# Patient Record
Sex: FEMALE | Race: WHITE | NOT HISPANIC OR LATINO | Employment: FULL TIME | URBAN - METROPOLITAN AREA
[De-identification: names, ages, dates, MRNs, and addresses within clinical notes are randomized per-mention and may not be internally consistent; named-entity substitution may affect disease eponyms.]

---

## 2020-02-05 ENCOUNTER — HOSPITAL ENCOUNTER (OUTPATIENT)
Dept: OTHER | Facility: HOSPITAL | Age: 50
Discharge: HOME | End: 2020-02-05
Attending: NURSE PRACTITIONER

## 2020-02-05 ENCOUNTER — OFFICE VISIT CONVERTED (OUTPATIENT)
Dept: FAMILY MEDICINE CLINIC | Facility: CLINIC | Age: 50
End: 2020-02-05
Attending: NURSE PRACTITIONER

## 2020-02-05 ENCOUNTER — CONVERSION ENCOUNTER (OUTPATIENT)
Dept: FAMILY MEDICINE CLINIC | Facility: CLINIC | Age: 50
End: 2020-02-05

## 2020-02-05 LAB
ALBUMIN SERPL-MCNC: 3.9 G/DL (ref 3.5–5)
ALBUMIN/GLOB SERPL: 1.2 {RATIO} (ref 1.4–2.6)
ALP SERPL-CCNC: 55 U/L (ref 42–98)
ALT SERPL-CCNC: 18 U/L (ref 10–40)
ANION GAP SERPL CALC-SCNC: 19 MMOL/L (ref 8–19)
AST SERPL-CCNC: 17 U/L (ref 15–50)
BASOPHILS # BLD AUTO: 0.04 10*3/UL (ref 0–0.2)
BASOPHILS NFR BLD AUTO: 0.4 % (ref 0–3)
BILIRUB SERPL-MCNC: 0.23 MG/DL (ref 0.2–1.3)
BUN SERPL-MCNC: 13 MG/DL (ref 5–25)
BUN/CREAT SERPL: 17 {RATIO} (ref 6–20)
CALCIUM SERPL-MCNC: 9.4 MG/DL (ref 8.7–10.4)
CHLORIDE SERPL-SCNC: 103 MMOL/L (ref 99–111)
CHOLEST SERPL-MCNC: 157 MG/DL (ref 107–200)
CHOLEST/HDLC SERPL: 2.3 {RATIO} (ref 3–6)
CONV ABS IMM GRAN: 0.04 10*3/UL (ref 0–0.2)
CONV CO2: 23 MMOL/L (ref 22–32)
CONV IMMATURE GRAN: 0.4 % (ref 0–1.8)
CONV TOTAL PROTEIN: 7.2 G/DL (ref 6.3–8.2)
CREAT UR-MCNC: 0.78 MG/DL (ref 0.5–0.9)
DEPRECATED RDW RBC AUTO: 42.1 FL (ref 36.4–46.3)
EOSINOPHIL # BLD AUTO: 0.17 10*3/UL (ref 0–0.7)
EOSINOPHIL # BLD AUTO: 1.6 % (ref 0–7)
ERYTHROCYTE [DISTWIDTH] IN BLOOD BY AUTOMATED COUNT: 12.2 % (ref 11.7–14.4)
ERYTHROCYTE [SEDIMENTATION RATE] IN BLOOD: 12 MM/H (ref 0–20)
GFR SERPLBLD BASED ON 1.73 SQ M-ARVRAT: >60 ML/MIN/{1.73_M2}
GLOBULIN UR ELPH-MCNC: 3.3 G/DL (ref 2–3.5)
GLUCOSE SERPL-MCNC: 86 MG/DL (ref 65–99)
HCT VFR BLD AUTO: 43.1 % (ref 37–47)
HDLC SERPL-MCNC: 69 MG/DL (ref 40–60)
HGB BLD-MCNC: 14.3 G/DL (ref 12–16)
LDLC SERPL CALC-MCNC: 65 MG/DL (ref 70–100)
LYMPHOCYTES # BLD AUTO: 2.13 10*3/UL (ref 1–5)
LYMPHOCYTES NFR BLD AUTO: 20.7 % (ref 20–45)
MCH RBC QN AUTO: 30.8 PG (ref 27–31)
MCHC RBC AUTO-ENTMCNC: 33.2 G/DL (ref 33–37)
MCV RBC AUTO: 92.7 FL (ref 81–99)
MONOCYTES # BLD AUTO: 0.86 10*3/UL (ref 0.2–1.2)
MONOCYTES NFR BLD AUTO: 8.3 % (ref 3–10)
NEUTROPHILS # BLD AUTO: 7.07 10*3/UL (ref 2–8)
NEUTROPHILS NFR BLD AUTO: 68.6 % (ref 30–85)
NRBC CBCN: 0 % (ref 0–0.7)
OSMOLALITY SERPL CALC.SUM OF ELEC: 291 MOSM/KG (ref 273–304)
PLATELET # BLD AUTO: 291 10*3/UL (ref 130–400)
PMV BLD AUTO: 8.5 FL (ref 9.4–12.3)
POTASSIUM SERPL-SCNC: 4.4 MMOL/L (ref 3.5–5.3)
RBC # BLD AUTO: 4.65 10*6/UL (ref 4.2–5.4)
SODIUM SERPL-SCNC: 141 MMOL/L (ref 135–147)
TRIGL SERPL-MCNC: 114 MG/DL (ref 40–150)
TSH SERPL-ACNC: 2.28 M[IU]/L (ref 0.27–4.2)
VLDLC SERPL-MCNC: 23 MG/DL (ref 5–37)
WBC # BLD AUTO: 10.31 10*3/UL (ref 4.8–10.8)

## 2020-06-17 ENCOUNTER — HOSPITAL ENCOUNTER (OUTPATIENT)
Dept: SLEEP MEDICINE | Facility: HOSPITAL | Age: 50
Discharge: HOME | End: 2020-06-17
Attending: INTERNAL MEDICINE

## 2020-10-02 ENCOUNTER — OFFICE VISIT CONVERTED (OUTPATIENT)
Dept: FAMILY MEDICINE CLINIC | Facility: CLINIC | Age: 50
End: 2020-10-02
Attending: NURSE PRACTITIONER

## 2020-10-02 ENCOUNTER — HOSPITAL ENCOUNTER (OUTPATIENT)
Dept: FAMILY MEDICINE CLINIC | Facility: CLINIC | Age: 50
Discharge: HOME | End: 2020-10-02
Attending: NURSE PRACTITIONER

## 2020-10-02 LAB
ALBUMIN SERPL-MCNC: 4.2 G/DL (ref 3.5–5)
ALBUMIN/GLOB SERPL: 1.5 {RATIO} (ref 1.4–2.6)
ALP SERPL-CCNC: 75 U/L (ref 42–98)
ALT SERPL-CCNC: 33 U/L (ref 10–40)
ANION GAP SERPL CALC-SCNC: 18 MMOL/L (ref 8–19)
AST SERPL-CCNC: 32 U/L (ref 15–50)
BASOPHILS # BLD AUTO: 0.05 10*3/UL (ref 0–0.2)
BASOPHILS NFR BLD AUTO: 0.5 % (ref 0–3)
BILIRUB SERPL-MCNC: 0.43 MG/DL (ref 0.2–1.3)
BUN SERPL-MCNC: 8 MG/DL (ref 5–25)
BUN/CREAT SERPL: 8 {RATIO} (ref 6–20)
CALCIUM SERPL-MCNC: 9.4 MG/DL (ref 8.7–10.4)
CHLORIDE SERPL-SCNC: 102 MMOL/L (ref 99–111)
CHOLEST SERPL-MCNC: 175 MG/DL (ref 107–200)
CHOLEST/HDLC SERPL: 3 {RATIO} (ref 3–6)
CONV ABS IMM GRAN: 0.03 10*3/UL (ref 0–0.2)
CONV CO2: 25 MMOL/L (ref 22–32)
CONV IMMATURE GRAN: 0.3 % (ref 0–1.8)
CONV TOTAL PROTEIN: 7 G/DL (ref 6.3–8.2)
CREAT UR-MCNC: 0.96 MG/DL (ref 0.5–0.9)
DEPRECATED RDW RBC AUTO: 43.5 FL (ref 36.4–46.3)
EOSINOPHIL # BLD AUTO: 0.08 10*3/UL (ref 0–0.7)
EOSINOPHIL # BLD AUTO: 0.8 % (ref 0–7)
ERYTHROCYTE [DISTWIDTH] IN BLOOD BY AUTOMATED COUNT: 12.4 % (ref 11.7–14.4)
GFR SERPLBLD BASED ON 1.73 SQ M-ARVRAT: >60 ML/MIN/{1.73_M2}
GLOBULIN UR ELPH-MCNC: 2.8 G/DL (ref 2–3.5)
GLUCOSE SERPL-MCNC: 88 MG/DL (ref 65–99)
HCT VFR BLD AUTO: 45.3 % (ref 37–47)
HDLC SERPL-MCNC: 58 MG/DL (ref 40–60)
HGB BLD-MCNC: 14.9 G/DL (ref 12–16)
LDLC SERPL CALC-MCNC: 99 MG/DL (ref 70–100)
LYMPHOCYTES # BLD AUTO: 2.04 10*3/UL (ref 1–5)
LYMPHOCYTES NFR BLD AUTO: 20.4 % (ref 20–45)
MCH RBC QN AUTO: 31.2 PG (ref 27–31)
MCHC RBC AUTO-ENTMCNC: 32.9 G/DL (ref 33–37)
MCV RBC AUTO: 95 FL (ref 81–99)
MONOCYTES # BLD AUTO: 0.74 10*3/UL (ref 0.2–1.2)
MONOCYTES NFR BLD AUTO: 7.4 % (ref 3–10)
NEUTROPHILS # BLD AUTO: 7.07 10*3/UL (ref 2–8)
NEUTROPHILS NFR BLD AUTO: 70.6 % (ref 30–85)
NRBC CBCN: 0 % (ref 0–0.7)
OSMOLALITY SERPL CALC.SUM OF ELEC: 288 MOSM/KG (ref 273–304)
PLATELET # BLD AUTO: 324 10*3/UL (ref 130–400)
PMV BLD AUTO: 9 FL (ref 9.4–12.3)
POTASSIUM SERPL-SCNC: 4.6 MMOL/L (ref 3.5–5.3)
RBC # BLD AUTO: 4.77 10*6/UL (ref 4.2–5.4)
SODIUM SERPL-SCNC: 140 MMOL/L (ref 135–147)
TRIGL SERPL-MCNC: 90 MG/DL (ref 40–150)
TSH SERPL-ACNC: 1.89 M[IU]/L (ref 0.27–4.2)
VLDLC SERPL-MCNC: 18 MG/DL (ref 5–37)
WBC # BLD AUTO: 10.01 10*3/UL (ref 4.8–10.8)

## 2020-10-03 LAB — 25(OH)D3 SERPL-MCNC: 37.6 NG/ML (ref 30–100)

## 2020-10-09 ENCOUNTER — HOSPITAL ENCOUNTER (OUTPATIENT)
Dept: FAMILY MEDICINE CLINIC | Facility: CLINIC | Age: 50
Discharge: HOME | End: 2020-10-09
Attending: NURSE PRACTITIONER

## 2020-10-09 ENCOUNTER — OFFICE VISIT CONVERTED (OUTPATIENT)
Dept: FAMILY MEDICINE CLINIC | Facility: CLINIC | Age: 50
End: 2020-10-09
Attending: NURSE PRACTITIONER

## 2020-10-15 LAB
CONV LAST MENSTURAL PERIOD: NORMAL
HPV HYBRID CAPTURE HIGH RISK: NEGATIVE
SPECIMEN SOURCE: NORMAL
SPECIMEN SOURCE: NORMAL
THIN PREP CVX: NORMAL

## 2021-01-11 ENCOUNTER — HOSPITAL ENCOUNTER (OUTPATIENT)
Dept: MAMMOGRAPHY | Facility: HOSPITAL | Age: 51
Discharge: HOME | End: 2021-01-11
Attending: NURSE PRACTITIONER

## 2021-02-12 ENCOUNTER — OFFICE VISIT CONVERTED (OUTPATIENT)
Dept: PULMONOLOGY | Facility: CLINIC | Age: 51
End: 2021-02-12
Attending: SPECIALIST

## 2021-03-25 ENCOUNTER — OFFICE VISIT CONVERTED (OUTPATIENT)
Dept: PULMONOLOGY | Facility: CLINIC | Age: 51
End: 2021-03-25
Attending: SPECIALIST

## 2021-04-30 ENCOUNTER — HOSPITAL ENCOUNTER (OUTPATIENT)
Dept: FAMILY MEDICINE CLINIC | Facility: CLINIC | Age: 51
Discharge: HOME | End: 2021-04-30
Attending: NURSE PRACTITIONER

## 2021-04-30 ENCOUNTER — OFFICE VISIT CONVERTED (OUTPATIENT)
Dept: FAMILY MEDICINE CLINIC | Facility: CLINIC | Age: 51
End: 2021-04-30
Attending: NURSE PRACTITIONER

## 2021-04-30 LAB
ALBUMIN SERPL-MCNC: 4.1 G/DL (ref 3.5–5)
ALBUMIN/GLOB SERPL: 1.3 {RATIO} (ref 1.4–2.6)
ALP SERPL-CCNC: 77 U/L (ref 42–98)
ALT SERPL-CCNC: 22 U/L (ref 10–40)
ANION GAP SERPL CALC-SCNC: 16 MMOL/L (ref 8–19)
AST SERPL-CCNC: 18 U/L (ref 15–50)
BASOPHILS # BLD AUTO: 0.07 10*3/UL (ref 0–0.2)
BASOPHILS NFR BLD AUTO: 0.6 % (ref 0–3)
BILIRUB SERPL-MCNC: 0.21 MG/DL (ref 0.2–1.3)
BUN SERPL-MCNC: 10 MG/DL (ref 5–25)
BUN/CREAT SERPL: 12 {RATIO} (ref 6–20)
CALCIUM SERPL-MCNC: 9.3 MG/DL (ref 8.7–10.4)
CHLORIDE SERPL-SCNC: 99 MMOL/L (ref 99–111)
CHOLEST SERPL-MCNC: 165 MG/DL (ref 107–200)
CHOLEST/HDLC SERPL: 3.2 {RATIO} (ref 3–6)
CONV ABS IMM GRAN: 0.05 10*3/UL (ref 0–0.2)
CONV CO2: 27 MMOL/L (ref 22–32)
CONV IMMATURE GRAN: 0.4 % (ref 0–1.8)
CONV TOTAL PROTEIN: 7.3 G/DL (ref 6.3–8.2)
CREAT UR-MCNC: 0.82 MG/DL (ref 0.5–0.9)
DEPRECATED RDW RBC AUTO: 42.4 FL (ref 36.4–46.3)
EOSINOPHIL # BLD AUTO: 0.31 10*3/UL (ref 0–0.7)
EOSINOPHIL # BLD AUTO: 2.7 % (ref 0–7)
ERYTHROCYTE [DISTWIDTH] IN BLOOD BY AUTOMATED COUNT: 12.3 % (ref 11.7–14.4)
EST. AVERAGE GLUCOSE BLD GHB EST-MCNC: 108 MG/DL
GFR SERPLBLD BASED ON 1.73 SQ M-ARVRAT: >60 ML/MIN/{1.73_M2}
GLOBULIN UR ELPH-MCNC: 3.2 G/DL (ref 2–3.5)
GLUCOSE SERPL-MCNC: 88 MG/DL (ref 65–99)
HBA1C MFR BLD: 5.4 % (ref 3.5–5.7)
HCT VFR BLD AUTO: 45 % (ref 37–47)
HDLC SERPL-MCNC: 52 MG/DL (ref 40–60)
HGB BLD-MCNC: 14.9 G/DL (ref 12–16)
LDLC SERPL CALC-MCNC: 68 MG/DL (ref 70–100)
LYMPHOCYTES # BLD AUTO: 2.72 10*3/UL (ref 1–5)
LYMPHOCYTES NFR BLD AUTO: 24 % (ref 20–45)
MCH RBC QN AUTO: 31 PG (ref 27–31)
MCHC RBC AUTO-ENTMCNC: 33.1 G/DL (ref 33–37)
MCV RBC AUTO: 93.6 FL (ref 81–99)
MONOCYTES # BLD AUTO: 1.27 10*3/UL (ref 0.2–1.2)
MONOCYTES NFR BLD AUTO: 11.2 % (ref 3–10)
NEUTROPHILS # BLD AUTO: 6.92 10*3/UL (ref 2–8)
NEUTROPHILS NFR BLD AUTO: 61.1 % (ref 30–85)
NRBC CBCN: 0 % (ref 0–0.7)
OSMOLALITY SERPL CALC.SUM OF ELEC: 284 MOSM/KG (ref 273–304)
PLATELET # BLD AUTO: 303 10*3/UL (ref 130–400)
PMV BLD AUTO: 9.2 FL (ref 9.4–12.3)
POTASSIUM SERPL-SCNC: 4 MMOL/L (ref 3.5–5.3)
RBC # BLD AUTO: 4.81 10*6/UL (ref 4.2–5.4)
SODIUM SERPL-SCNC: 138 MMOL/L (ref 135–147)
TRIGL SERPL-MCNC: 226 MG/DL (ref 40–150)
TSH SERPL-ACNC: 3.04 M[IU]/L (ref 0.27–4.2)
VLDLC SERPL-MCNC: 45 MG/DL (ref 5–37)
WBC # BLD AUTO: 11.34 10*3/UL (ref 4.8–10.8)

## 2021-05-01 LAB — 25(OH)D3 SERPL-MCNC: 29.3 NG/ML (ref 30–100)

## 2021-05-03 ENCOUNTER — HOSPITAL ENCOUNTER (OUTPATIENT)
Dept: OTHER | Facility: HOSPITAL | Age: 51
Discharge: HOME | End: 2021-05-03
Attending: NURSE PRACTITIONER

## 2021-05-13 NOTE — PROGRESS NOTES
Progress Note      Patient Name: Paola Sow   Patient ID: [id number]   Sex: Female   YOB: 1970        Visit Date: October 2, 2020    Provider: DEWEY Rashid   Location: [facility]   Location Address: [address] [place] [address], [address] [place] [address]  [id number]   Location Phone: [phone]          Chief Complaint  · 6 mth F/U  · Med refill  · Hemorrhoids      History Of Present Illness  Paola Sow is a 49 year old female who presents for evaluation and treatment of:      Patient is a 49-year-old female who comes in for 6-month follow-up.  She has never smoked.  She is due for mammogram and a Pap smear.  She has a history of hypertension, GERD, hemorrhoids, and insomnia.  Per patient she is currently needing refills of her hydroxychloroquine and meloxicam.    Patient is complaining of hemorrhoids been ongoing for the past 2 days.  She is not currently taking anything for the hemorrhoids.  She states that there are bleeding, and inflamed.  She states they are painful and itching.  She has had a history of hemorrhoids in the past.  She states that she has been taking MiraLAX to help with any constipation.       Past Medical History  Disease Name Date Onset Notes   Anxiety --  --    Arthritis --  --    Asthma --  --    Hemorrhoids --  --    High blood pressure --  --    Reflux Disease --  --          Medication List  Name Date Started Instructions   famotidine 20 mg oral tablet 06/03/2020 take 1 tablet (20 mg) by oral route once daily at bedtime for 90 days   hydroxychloroquine 200 mg oral tablet 10/02/2020 take 2 tablets (400 mg) by oral route once daily for 90 days   lisinopril 20 mg oral tablet 06/03/2020 take 1 tablet (20 mg) by oral route once daily for 90 days   meloxicam 15 mg oral tablet 10/02/2020 take 1 tablet (15 mg) by oral route once daily for 90 days   metoprolol succinate 25 mg oral tablet extended release 24 hr 06/03/2020 take 1 tablet (25 mg) by oral route  "once daily for 90 days   trazodone 50 mg oral tablet 09/01/2020 take 1 tablet by oral route daily as needed for 90 days   triamterene-hydrochlorothiazid 37.5-25 mg oral tablet 06/03/2020 take 1 tablet by oral route once daily for 90 days   Vitamin D3 25 mcg (1,000 unit) oral capsule 06/03/2020 take 1 capsule by oral route daily for 90 days         Allergy List  Allergen Name Date Reaction Notes   NO KNOWN DRUG ALLERGIES --  --  --        Allergies Reconciled  Family Medical History  Disease Name Relative/Age Notes   Heart Disease Father/  Mother/   --          Social History  Finding Status Start/Stop Quantity Notes   Tobacco Never --/-- --  --          Immunizations  NameDate Admin Mfg Trade Name Lot Number Route Inj VIS Given VIS Publication   [id number]/01/2019 [facility] Fluzone Quadrivalent  NE NE 02/05/2020    Comments: pt states received 10/19 in [place]         Review of Systems  · Constitutional  o Denies  o : fever, fatigue, weight loss, weight gain  · Eyes  o Denies  o : double vision, impaired vision, blurred vision  · HENT  o Denies  o : headaches, vertigo, lightheadedness  · Cardiovascular  o Denies  o : lower extremity edema, claudication, chest pressure, palpitations  · Respiratory  o Denies  o : shortness of breath, wheezing, cough, hemoptysis, dyspnea on exertion  · Gastrointestinal  o Admits  o : heartburn, hemorrhoids  o Denies  o : nausea, vomiting, diarrhea  · Integument  o Denies  o : rash, itching, pigmentation changes  · Musculoskeletal  o Denies  o : joint pain, joint swelling, muscle pain  · Psychiatric  o Denies  o : anxiety, depression, suicidal ideation, homicidal ideation      Vitals  Date Time BP Position Site L\R Cuff Size HR RR TEMP (F) WT  HT  BMI kg/m2 BSA m2 O2 Sat FR L/min FiO2 HC       10/02/2020 01:55 /90 Sitting    103 - R  97.3 327lbs 4oz 5'  7\" 51.25 2.65 99 %            Physical Examination  · Constitutional  o Appearance  o : well-nourished, well developed, in no " acute distress  · Eyes  o Conjunctivae  o : conjunctivae normal, no exudates present  o Sclerae  o : sclerae white  o Pupils and Irises  o : pupils equal and round, and reactive to light and accomodation bilaterally  o Eyelids/Ocular Adnexae  o : extra ocular movements intact  · Respiratory  o Respiratory Effort  o : breathing unlabored, no accessory muscle use  o Inspection of Chest  o : normal appearance, no retractions  o Auscultation of Lungs  o : normal breath sounds bilaterally  · Cardiovascular  o Heart  o :   § Auscultation of Heart  § : regular rate and rhythm, no murmurs, gallops or rubs  o Peripheral Vascular System  o :   § Extremities  § : no edema  · Neurologic  o Mental Status Examination  o :   § Orientation  § : alert and oriented x3  § Speech/Language  § : normal speech pattern  o Gait and Station  o : normal gait, able to stand without difficulty  · Psychiatric  o Judgement and Insight  o : judgment and insight intact, judgement for everyday activities and social situations within normal limits, insight intact  o Thought Processes  o : rate of thoughts normal, thought content logical  o Mood and Affect  o : mood normal, affect appropriate              Assessment  · Allergic rhinitis due to allergen     477.9/J30.9  · Visit for screening mammogram     V76.12/Z12.31  · Arthritis     V13.4/V13.4  Will refill the meloxicam.  · High blood pressure     401.9/I10  Blood pressure stable not needing refills of medication at this time we will continue to monitor.  · Reflux Disease     530.81  GERD is stable not needing medication refills at this time we will continue to monitor.  · Hemorrhoids     455.6/K64.9  Will send over lidocaine/hydrocortisone cream, discussed purchasing Tucks pads and continue with MiraLAX. Discussed return precautions. Patient verbalized understanding is agreeable treatment plan.      Plan  · Orders  o Screening Mammography; Bilateral 3D (03746, 54396, ) - V76.12/Z12.31 -  10/02/2020   Mondays best  o Physical, Primary Care Panel (CBC, CMP, Lipid, TSH) [facility] (87915, 89123, 58929, 54011) - V13.4/V13.4, 401.9/I10, 530.81 - 10/02/2020  o Vitamin D (25-Hydroxy) Level (54966) - V13.4/V13.4, 401.9/I10, 530.81 - 10/02/2020  o ACO-39: Current medications updated and reviewed (1159F, ) - - 10/02/2020  o ACO-14: Influenza immunization was not administered for reasons documented [facility] () - - 10/02/2020   Out of stock of flu shots  · Medications  o lidocaine HCl-hydrocortison ac 3-0.5 % rectal cream   SIG: insert 1 applicatorful by rectal route 2 times per day for 14 days   DISP: (1) Tube with 5 refills  Prescribed on 10/02/2020     o loratadine 10 mg oral tablet   SIG: take 1 tablet (10 mg) by oral route once daily for 90 days   DISP: (90) Tablet with 2 refills  Prescribed on 10/02/2020     o hydroxychloroquine 200 mg oral tablet   SIG: take 2 tablets (400 mg) by oral route once daily for 90 days   DISP: (180) Tablet with 1 refills  Adjusted on 10/02/2020     o meloxicam 15 mg oral tablet   SIG: take 1 tablet (15 mg) by oral route once daily for 90 days   DISP: (90) Tablet with 1 refills  Adjusted on 10/02/2020     · Instructions  o Take all medications as prescribed/directed.  o Patient was educated/instructed on their diagnosis, treatment and medications prior to discharge from the clinic today.  · Disposition  o Call or Return if symptoms worsen or persist.  o follow up as needed  o call the office with any questions or concerns            Electronically Signed by: DEWEY Rashid -Author on October 2, 2020 02:28:08 PM

## 2021-05-13 NOTE — PROGRESS NOTES
Progress Note      Patient Name: Paola Sow   Patient ID: [id number]   Sex: Female   YOB: 1970        Visit Date: October 9, 2020    Provider: DEWEY Rashid   [place] [facility] [place]   Location Address: [address] [place] [address], [address] [place] [address]  [id number]   Location Phone: [phone]          Chief Complaint  · Annual Exam  · PAP exam  · (Health Maintainence Information Reviewed Under Results)      History Of Present Illness  Paola Sow is a 49 year old /White female who presents for evaluation and treatment of:   Last PAP Smear: 03/14/2019.   Date of Last Mammogram: 03/13/2019.   Date of Last Colonoscopy: 08/13/2015   No current complaints.      She had abnormal Pap 25 years ago they repeated the Pap and it was normal.  She states she is never had abnormal Paps since.  No family history of breast or cervical cancer.  Mammogram is scheduled for January 2021.  Needing medication refills.  Blood pressure is elevated today at 140/90.  She denies any symptoms of headache, chest pain or change in vision.  She states she does feel anxious, due to doing her well woman exam.       Past Medical History  Disease Name Date Onset Notes   Anxiety --  --    Arthritis --  --    Asthma --  --    Hemorrhoids --  --    High blood pressure --  --    Reflux Disease --  --          Medication List  Name Date Started Instructions   famotidine 20 mg oral tablet 10/09/2020 take 1 tablet (20 mg) by oral route once daily at bedtime for 90 days   hydroxychloroquine 200 mg oral tablet 10/02/2020 take 2 tablets (400 mg) by oral route once daily for 90 days   lidocaine HCl-hydrocortison ac 3-0.5 % rectal cream 10/02/2020 insert 1 applicatorful by rectal route 2 times per day for 14 days   lisinopril 20 mg oral tablet 10/09/2020 take 1 tablet (20 mg) by oral route once daily for 90 days   loratadine 10 mg oral tablet 10/02/2020 take 1 tablet (10 mg) by oral route once daily for 90 days    meloxicam 15 mg oral tablet 10/02/2020 take 1 tablet (15 mg) by oral route once daily for 90 days   metoprolol succinate 25 mg oral tablet extended release 24 hr 10/09/2020 take 1 tablet (25 mg) by oral route once daily for 90 days   trazodone 50 mg oral tablet 09/01/2020 take 1 tablet by oral route daily as needed for 90 days   triamterene-hydrochlorothiazid 37.5-25 mg oral tablet 10/09/2020 take 1 tablet by oral route once daily for 90 days   Vitamin D3 25 mcg (1,000 unit) oral capsule 06/03/2020 take 1 capsule by oral route daily for 90 days         Allergy List  Allergen Name Date Reaction Notes   NO KNOWN DRUG ALLERGIES --  --  --        Allergies Reconciled  Family Medical History  Disease Name Relative/Age Notes   Heart Disease Father/  Mother/   --          Social History  Finding Status Start/Stop Quantity Notes   Tobacco Never --/-- --  --          Immunizations  NameDate Admin Mfg Trade Name Lot Number Route Inj VIS Given VIS Publication   [id number]/09/2020 [facility] Fluzone Quadrivalent  NE NE 10/09/2020    Comments: Recieved at work         Review of Systems  · Constitutional  o Denies  o : fatigue, night sweats  · Eyes  o Denies  o : double vision, blurred vision  · HENT  o Denies  o : headaches, vertigo, lightheadedness  · Breasts  o Denies  o : lumps, tenderness, swelling, abnormal changes in breast size  · Cardiovascular  o Denies  o : chest pain, irregular heart beats  · Respiratory  o Denies  o : shortness of breath, productive cough  · Gastrointestinal  o Denies  o : nausea, vomiting  · Genitourinary  o Denies  o : dysuria, urinary retention  · Integument  o Denies  o : hair growth change, new skin lesions  · Neurologic  o Denies  o : altered mental status, seizures  · Musculoskeletal  o Denies  o : joint swelling, limitation of motion  · Endocrine  o Denies  o : cold intolerance, heat intolerance      Vitals  Date Time BP Position Site L\R Cuff Size HR RR TEMP (F) WT  HT  BMI kg/m2 BSA m2 O2 Sat  "FR L/min FiO2 HC       10/09/2020 03:31 /92 Sitting    104 - R  97.1 326lbs 9oz 5'  7\" 51.15 2.65 97 %      10/09/2020 03:33 /92 Sitting                       Physical Examination  · Constitutional  o Appearance  o : well-nourished, in no acute distress  · Respiratory  o Respiratory Effort  o : breathing unlabored  o Inspection of Chest  o : normal appearance  o Auscultation of Lungs  o : normal breath sounds throughout  · Cardiovascular  o Heart  o :   § Auscultation of Heart  § : regular rate and rhythm, no murmurs, gallops or rubs  · Breasts  o Inspection of Breasts  o : breasts symmetrical, no skin changes, no deformities present, no discharge present  o Palpation of Breasts, Axillae  o : no masses present on palpation, no breast tenderness  · Genitourinary  o External Genitalia  o : no inflammation, no lesions present  o Vagina  o : normal vaginal vault, no discharge present, no inflammatory lesions present, no masses present  o Bladder  o : nontender to palpation  o Cervix  o : appearance healthy, no lesions present, nontender to palpation, no discharges, no bleeding present, normal midline position  o Uterus  o : nontender to palpation, no masses present, position midline/midplane  o Adnexa  o : no tenderness or masses present on bimanual examination  o Anus  o : no inflammation or lesions present  o Perineum  o : perineum within normal limits  · Neurologic  o Mental Status Examination  o :   § Orientation  § : grossly oriented to person, place and time  o Gait and Station  o : normal gait, able to stand without difficulty  · Psychiatric  o Judgement and Insight  o : judgment and insight intact  o Mood and Affect  o : mood normal, affect appropriate              Assessment  · Routine gynecological examination     V72.31/Z01.419  · Pap Smear     V76.2/Z01.419  · MARGOT (obstructive sleep apnea)     327.23/G47.33  Patient is needing another referral to pulmonology since COVID-19 her appointment got " rescheduled and she never got another appointment to follow-up with pulmonology.      Plan  · Orders  o Pap smear (96506) - V76.2/Z01.419 - 10/09/2020  o ACO-39: Current medications updated and reviewed (1159F, ) - - 10/09/2020  o ACO-14: Influenza immunization administered or previously received [facility] () - - 10/09/2020  o PULMONARY CONSULTATION (PULMO) - 327.23/G47.33 - 10/09/2020   [facility] pulmonology  · Medications  o famotidine 20 mg oral tablet   SIG: take 1 tablet (20 mg) by oral route once daily at bedtime for 90 days   DISP: (90) Tablet with 1 refills  Adjusted on 10/09/2020     o lisinopril 20 mg oral tablet   SIG: take 1 tablet (20 mg) by oral route once daily for 90 days   DISP: (90) Tablet with 1 refills  Adjusted on 10/09/2020     o metoprolol succinate 25 mg oral tablet extended release 24 hr   SIG: take 1 tablet (25 mg) by oral route once daily for 90 days   DISP: (90) Tablet with 1 refills  Adjusted on 10/09/2020     o triamterene-hydrochlorothiazid 37.5-25 mg oral tablet   SIG: take 1 tablet by oral route once daily for 90 days   DISP: (90) Tablet with 1 refills  Adjusted on 10/09/2020     · Instructions  o **Pap Test/Liquid Based:   o Thin Prep  o Source:   o Cervix  o ********  o **Perform Reflex Human Papilloma Virus (HPV) High Risk on this Pap (If atypical squamous cells of the undetermined signifigcance (ASCUS)/Atypical Glandular Cells of undetermined significance (AGCUS): Low Grade Squamous Intraepitheal lesion (LGSIL): **  o **Perform a routine Human Papilloma Virus (HPV) High Risk on this Pap   o Medicare:  o No  o **Is this an annual PAP:  o Yes  o Patient was educated/instructed on their diagnosis, treatment and medications prior to discharge from the clinic today.  o Call the office with any concerns or questions.  o Counseled on monthly breast self exams.   o Counseled on STD prevention.  o Counseled on diet and exercise.   o Counseled on weight-bearing exercise.  o Recommended  Calcium with Vitamin D twice daily.  · Disposition  o Call or Return if symptoms worsen or persist.  o follow up as needed  o call the office with any questions or concerns            Electronically Signed by: Laurence Jacob APRN -Author on October 9, 2020 03:54:16 PM

## 2021-05-14 VITALS
BODY MASS INDEX: 45.99 KG/M2 | HEIGHT: 67 IN | WEIGHT: 293 LBS | HEART RATE: 82 BPM | DIASTOLIC BLOOD PRESSURE: 84 MMHG | SYSTOLIC BLOOD PRESSURE: 124 MMHG | OXYGEN SATURATION: 100 %

## 2021-05-14 VITALS
OXYGEN SATURATION: 99 % | SYSTOLIC BLOOD PRESSURE: 136 MMHG | BODY MASS INDEX: 45.99 KG/M2 | HEART RATE: 103 BPM | HEIGHT: 67 IN | WEIGHT: 293 LBS | DIASTOLIC BLOOD PRESSURE: 90 MMHG | TEMPERATURE: 97.3 F

## 2021-05-14 VITALS
HEART RATE: 104 BPM | HEIGHT: 67 IN | SYSTOLIC BLOOD PRESSURE: 140 MMHG | WEIGHT: 293 LBS | BODY MASS INDEX: 45.99 KG/M2 | DIASTOLIC BLOOD PRESSURE: 92 MMHG | TEMPERATURE: 97.1 F | OXYGEN SATURATION: 97 %

## 2021-05-15 VITALS
WEIGHT: 293 LBS | DIASTOLIC BLOOD PRESSURE: 94 MMHG | TEMPERATURE: 98.8 F | HEART RATE: 103 BPM | OXYGEN SATURATION: 97 % | SYSTOLIC BLOOD PRESSURE: 146 MMHG | HEIGHT: 67 IN | BODY MASS INDEX: 45.99 KG/M2

## 2021-05-24 ENCOUNTER — OFFICE VISIT CONVERTED (OUTPATIENT)
Dept: PULMONOLOGY | Facility: CLINIC | Age: 51
End: 2021-05-24
Attending: SPECIALIST

## 2021-05-27 ENCOUNTER — OFFICE VISIT CONVERTED (OUTPATIENT)
Dept: ORTHOPEDIC SURGERY | Facility: CLINIC | Age: 51
End: 2021-05-27
Attending: ORTHOPAEDIC SURGERY

## 2021-05-28 VITALS
HEIGHT: 67 IN | DIASTOLIC BLOOD PRESSURE: 76 MMHG | BODY MASS INDEX: 45.99 KG/M2 | RESPIRATION RATE: 15 BRPM | DIASTOLIC BLOOD PRESSURE: 84 MMHG | RESPIRATION RATE: 16 BRPM | HEIGHT: 67 IN | SYSTOLIC BLOOD PRESSURE: 149 MMHG | TEMPERATURE: 97.3 F | SYSTOLIC BLOOD PRESSURE: 123 MMHG | OXYGEN SATURATION: 99 % | HEART RATE: 99 BPM | OXYGEN SATURATION: 98 % | OXYGEN SATURATION: 97 % | RESPIRATION RATE: 16 BRPM | HEART RATE: 87 BPM | BODY MASS INDEX: 45.99 KG/M2 | WEIGHT: 293 LBS | TEMPERATURE: 96.1 F | SYSTOLIC BLOOD PRESSURE: 147 MMHG | WEIGHT: 293 LBS | WEIGHT: 293 LBS | HEIGHT: 67 IN | TEMPERATURE: 97.5 F | BODY MASS INDEX: 45.99 KG/M2 | DIASTOLIC BLOOD PRESSURE: 81 MMHG | HEART RATE: 90 BPM

## 2021-05-28 NOTE — PROGRESS NOTES
Patient: ROSA GIBSON     Acct: [id number]     Report: #[id number]  UNIT #: [id number]     : 1970    Encounter Date:2021  PRIMARY CARE: BREANA AKERS  ***Signed***  --------------------------------------------------------------------------------------------------------------------  Chief Complaint      Encounter Date      Mar 25, 2021            Primary Care Provider      BREANA AKERS            Referring Provider      BREANA AKERS            Patient Complaint      Patient is complaining of      Pt here for 4-6W F/U. MARGOT, Hypertension            VITALS      Height 5 ft 7 in / 170.18 cm      Weight 330 lbs  / 149.870196 kg      BSA 2.50 m2      BMI 51.7 kg/m2      Temperature 97.5 F / 36.39 C - Temporal      Pulse 90      Respirations 15      Blood Pressure 123/76 Sitting, Left Arm      Pulse Oximetry 98%, room air            HPI      This is a 50-year-old very pleasant female who [place]     and had a sleep studies done in  and the study reviewed a and the study     showed overall AHI of 54.9 events per hour with the lowest oxygen saturation     being 47% and longest duration is 69.5 seconds with the loud snoring.      Subsequently patient had the CPAP titration done and was started on 12 cm of     water pressure.  Patient gained weight and not doing very well with this     pressure.  Unable to get the supplies locally as the patient did not have any     local physician to order.  Patient still continues to snore and stop breathing     and feeling tired during the day.  Other review of the systems and related is     reviewed and as documented.            ROS      Constitutional:  Denies: Fatigue, Fever, Weight gain, Weight loss, Chills,     Insomnia, Other      Respiratory/Breathing:  Denies: Shortness of air, Wheezing, Cough, Hemoptysis,     Pleuritic pain, Other      Endocrine:  Denies: Polydipsia, Polyuria, Heat/cold intolerance, Abnorml     menstrual pattern,  Diabetes, Other      Eyes:  Denies: Blurred vision, Vision Changes, Other      Ears, nose, mouth, throat:  Denies: Mouth lesions, Thrush, Throat pain,     Hoarseness, Allergies/Hay Fever, Post Nasal Drip, Headaches, Recent Head Injury,    Nose Bleeding, Neck Stiffness, Thyroid Mass, Hearing Loss, Ear Fullness, Dry     Mouth, Nasal or Sinus Pain, Dry Lips, Nasal discharge, Nasal congestion, Other      Cardiovascular:  Denies: Palpitations, Syncope, Claudication, Chest Pain, Wake     up Gasping for air, Leg Swelling, Irregular Heart Rate, Cyanosis, Dyspnea on     Exertion, Other      Gastrointestinal:  Denies: Nausea, Constipation, Diarrhea, Abdominal pain, Vom    iting, Difficulty Swallowing, Reflux/Heartburn, Dysphagia, Jaundice, Bloating,     Melena, Bloody stools, Other      Genitourinary:  Denies: Urinary frequency, Incontinence, Hematuria, Urgency,     Nocturia, Dysuria, Testicular problems, Other      Musculoskeletal:  Denies: Joint Pain, Joint Stiffness, Joint Swelling, Myalgias,    Other      Hematologic/lymphatic:  DENIES: Lymphadenopathy, Bruising, Bleeding tendencies,     Other      Neurological:  Denies: Headache, Numbness, Weakness, Seizures, Other      Psychiatric:  Denies: Anxiety, Appropriate Effect, Depression, Other      Sleep:  No: Excessive daytime sleep, Morning Headache?, Snoring, Insomnia?, Stop    breathing at sleep?, Other      Integumentary:  Denies: Rash, Dry skin, Skin Warm to Touch, Other      Immunologic/Allergic:  Denies: Latex allergy, Seasonal allergies, Asthma,     Urticaria, Eczema, Other      Immunization status:  No: Up to date            FAMILY/SOCIAL/MEDICAL HX      Surgical History:  Yes: Oral Surgery, Other Surgeries (Feet)      Heart - Family Hx:  Father, Grandparent      Diabetes - Family Hx:  Uncle      Cancer/Type - Family Hx:  Father      Other Family Medical History:  Mother      Is Father Still Living?:  Yes      Is Mother Still Living?:  Yes      Social History:  No  Tobacco Use, No Alcohol Use, No Recreational Drug use      Smoking status:  Never smoker      Medical History:  Yes: Allergies, Arthritis, High Blood Pressure, Reflux     Disease; No: Sinus Trouble      Psychiatric History      None            PREVENTION      Hx Influenza Vaccination:  Yes      Date Influenza Vaccine Given:  Feb 28, 2021      Influenza Vaccine Declined:  No      2 or More Falls in Past Year?:  No      Fall Past Year with Injury?:  No      Hx Pneumococcal Vaccination:  No      Encouraged to follow-up with:  PCP regarding preventative exams.      Chart initiated by      Kristie Laura MA            ALLERGIES/MEDICATIONS      Allergies:        Coded Allergies:             NO KNOWN DRUG ALLERGIES (Verified  Allergy, Intermediate, 3/25/21)      Medications    Last Reconciled on 3/25/21 13:14 by CARLOS ALBERTO VILLALPANDO      Hydroxychloroquine Sulfate (Plaquenil) 200 Mg Tablet      400 MG PO QDAY, TAB         Reported         2/12/21       Cholecalciferol (Vitamin D3) (Vitamin D3) 1,000 Units Capsule      1000 UNITS PO QDAY, CAP         Reported         2/12/21       Famotidine (Famotidine) 10 Mg Tablet      10 MG PO HS for 30 Days, #30 TAB         Reported         2/12/21       Loratadine (Loratadine) 10 Mg Tablet      10 MG PO HS, #30 TAB 0 Refills         Reported         2/12/21       traZODone HCl (traZODone HCl) 50 Mg Tablet      50 MG PO HS, #30 TAB 0 Refills         Reported         2/12/21       Triamterene/HCTZ (Dyazide 37.5/25 MG) 1 Each Capsule      1 CAP PO QDAY, CAP         Reported         2/12/21       Meloxicam (Meloxicam*) 15 Mg Tablet      15 MG PO HS, #30 TAB 0 Refills         Reported         2/12/21       Lisinopril* (Lisinopril*) 20 Mg Tablet      20 MG PO HS, #30 TAB 0 Refills         Reported         2/12/21       Metoprolol Succinate (Metoprolol Succinate) 25 Mg Tab.er.24h      25 MG PO QDAY, #30 TAB 0 Refills         Reported         2/12/21      Current Medications      Current  Medications Reviewed 3/25/21            EXAM      Vtials      Vitals:             Height 5 ft 7 in / 170.18 cm           Weight 330 lbs  / 149.605903 kg           BSA 2.50 m2           BMI 51.7 kg/m2           Temperature 97.5 F / 36.39 C - Temporal           Pulse 90           Respirations 15           Blood Pressure 123/76 Sitting, Left Arm           Pulse Oximetry 98%, room air            Constitutional      General appearance:  Comfortable            Eyes      Conjunctiva:  Bilateral: Normal            Neck      Thyroid - size:  Normal            Respiratory      Work of breathing:  Normal      Auscultation:  Bilateral: Normal            Cardiovascular      Rhythm:  regular      Heart sounds:  NORMAL: S1, S2            Gastrointestinal      Abdomen description:  Normal      Hepatosplenomegaly:  none      Mass:  None            Extremity      Radial pulse:  Bilateral: Normal            Skin      General color:  Normal            Assessment      Obstructive sleep apnea syndrome in adult - G47.33            Morbid obesity with BMI of 50.0-59.9, adult - E66.01, Z68.43            Notes      Unable to download the present old CPAP.      Discussed with the patient about the present findings and the sleep study     reports.  Options explained.      To start with we will start the patient on auto titration with pressures of 12-    16 with a properly fitted mask and supplies.  Follow-up in 8 weeks after using     the PAP with a download of the new Pap      Any problem in between she is going to call me.  Once the patient is stable, I     may need to consider checking the overnight pulse oximetry on the CPAP and as     needed.  Other medical management is being followed by primary team physician.      Diet and exercise modification.            Electronically signed by CARLOS ALBERTO VILLALPANDO  03/25/2021 13:14       Disclaimer: Converted document may not contain table formatting or lab diagrams. Please see [employer] for the authenticated document.

## 2021-05-28 NOTE — PROGRESS NOTES
Patient: ROSA GIBSON     Acct: [id number]     Report: #[id number]  UNIT #: [id number]     : 1970    Encounter Date:2021  PRIMARY CARE: BREANA AKERS  ***Signed***  --------------------------------------------------------------------------------------------------------------------  Chief Complaint      Encounter Date      2021            Primary Care Provider      BREANA AKERS            Referring Provider      BREANA AKERS            Patient Complaint      Patient is complaining of      New pt here for parveen            VITALS      Height 5 ft 7 in / 170.18 cm      Weight 335 lbs 7 oz / 152.197996 kg      BSA 2.52 m2      BMI 52.5 kg/m2      Temperature 96.1 F / 35.61 C - Temporal      Pulse 99      Respirations 16      Blood Pressure 149/84 Sitting, Right Arm      Pulse Oximetry 99%, Room air            HPI      This is a 50-year-old very pleasant 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  On the study when she had it done     weight about 304 pounds and now about 335 pounds.      Patient is noticed that she is getting tired and also was told that she snores     and also her insomnia.  Patient tried to take the trazodone which is not much     help.  She is not getting any supplies from her DME as she did not have any     follow-up and took almost 1 year to have a follow-up here because of the COVID-    19.      Patient get the supplies from [employer] but not able to get the properly fitted     mask.      I reviewed the polysomnogram done on 2014 and had an AHI of 54.9 and the     majority of them are obstructive.  Patient had a chip which we could not     download here.  Patient stated her pressures are 12.  She was wondering whether     she needs any new machine and help for her to sleep.  She is not a smoker and     works in the desk never had any  pulmonary function testing done has a dog in the    house with no problem and no bronchodilators and as noted above patient gained     weight significantly.      Other medical history, family history, social history and related are done and     as documented            ROS      Constitutional:  Denies: Fatigue, Fever, Weight gain, Weight loss, Chills,     Insomnia, Other      Respiratory/Breathing:  Denies: Shortness of air, Wheezing, Cough, Hemoptysis,     Pleuritic pain, Other      Endocrine:  Denies: Polydipsia, Polyuria, Heat/cold intolerance, Abnorml     menstrual pattern, Diabetes, Other      Eyes:  Denies: Blurred vision, Vision Changes, Other      Ears, nose, mouth, throat:  Denies: Mouth lesions, Thrush, Throat pain,     Hoarseness, Allergies/Hay Fever, Post Nasal Drip, Headaches, Recent Head Injury,    Nose Bleeding, Neck Stiffness, Thyroid Mass, Hearing Loss, Ear Fullness, Dry     Mouth, Nasal or Sinus Pain, Dry Lips, Nasal discharge, Nasal congestion, Other      Cardiovascular:  Denies: Palpitations, Syncope, Claudication, Chest Pain, Wake     up Gasping for air, Leg Swelling, Irregular Heart Rate, Cyanosis, Dyspnea on     Exertion, Other      Gastrointestinal:  Denies: Nausea, Constipation, Diarrhea, Abdominal pain,     Vomiting, Difficulty Swallowing, Reflux/Heartburn, Dysphagia, Jaundice,     Bloating, Melena, Bloody stools, Other      Genitourinary:  Denies: Urinary frequency, Incontinence, Hematuria, Urgency,     Nocturia, Dysuria, Testicular problems, Other      Musculoskeletal:  Denies: Joint Pain, Joint Stiffness, Joint Swelling, Myalgias,    Other      Hematologic/lymphatic:  DENIES: Lymphadenopathy, Bruising, Bleeding tendencies,     Other      Neurological:  Denies: Headache, Numbness, Weakness, Seizures, Other      Psychiatric:  Denies: Anxiety, Appropriate Effect, Depression, Other      Sleep:  Yes: Snoring, Insomnia?; No: Excessive daytime sleep, Morning Headache?,    Stop breathing at  sleep?, Other      Integumentary:  Denies: Rash, Dry skin, Skin Warm to Touch, Other      Immunologic/Allergic:  Denies: Latex allergy, Seasonal allergies, Asthma,     Urticaria, Eczema, Other      Immunization status:  No: Up to date            FAMILY/SOCIAL/MEDICAL HX      Surgical History:  Yes: Oral Surgery, Other Surgeries (Feet)      Heart - Family Hx:  Father, Grandparent      Diabetes - Family Hx:  Uncle      Cancer/Type - Family Hx:  Father      Other Family Medical History:  Mother      Is Father Still Living?:  Yes      Is Mother Still Living?:  Yes      Social History:  No Tobacco Use, No Alcohol Use, No Recreational Drug use      Smoking status:  Never smoker      Medical History:  Yes: Allergies, Arthritis, High Blood Pressure, Reflux Disease      Psychiatric History      NOne            PREVENTION      Hx Influenza Vaccination:  Yes      Date Influenza Vaccine Given:  Nov 1, 2020      Influenza Vaccine Declined:  No      2 or More Falls in Past Year?:  No      Fall Past Year with Injury?:  No      Hx Pneumococcal Vaccination:  No      Encouraged to follow-up with:  PCP regarding preventative exams.      Chart initiated by      Alicia Ferrer MA            ALLERGIES/MEDICATIONS      Allergies:        Coded Allergies:             No Known Drug Allergies (Verified  Allergy, Intermediate, 2/12/21)      Medications    Last Reconciled on 2/12/21 14:33 by CARLOS ALBERTO VILLALPANDO      Hydroxychloroquine Sulfate (Plaquenil) 200 Mg Tablet      400 MG PO QDAY, TAB         Reported         2/12/21       Cholecalciferol (Vitamin D3) (Vitamin D3) 1,000 Units Capsule      1000 UNITS PO QDAY, CAP         Reported         2/12/21       Famotidine (Famotidine) 10 Mg Tablet      10 MG PO HS for 30 Days, #30 TAB         Reported         2/12/21       Loratadine (Loratadine) 10 Mg Tablet      10 MG PO HS, #30 TAB 0 Refills         Reported         2/12/21       traZODone HCl (traZODone HCl) 50 Mg Tablet      50 MG PO HS, #30 TAB 0  Refills         Reported         2/12/21       Triamterene/HCTZ (Dyazide 37.5/25 MG) 1 Each Capsule      1 CAP PO QDAY, CAP         Reported         2/12/21       Meloxicam (Meloxicam*) 15 Mg Tablet      15 MG PO HS, #30 TAB 0 Refills         Reported         2/12/21       Lisinopril* (Lisinopril*) 20 Mg Tablet      20 MG PO HS, #30 TAB 0 Refills         Reported         2/12/21       Metoprolol Succinate (Metoprolol Succinate) 25 Mg Tab.er.24h      25 MG PO QDAY, #30 TAB 0 Refills         Reported         2/12/21      Current Medications      Current Medications Reviewed 2/12/21            EXAM      Vtials      Vitals:             Height 5 ft 7 in / 170.18 cm           Weight 335 lbs 7 oz / 152.999410 kg           BSA 2.52 m2           BMI 52.5 kg/m2           Temperature 96.1 F / 35.61 C - Temporal           Pulse 99           Respirations 16           Blood Pressure 149/84 Sitting, Right Arm           Pulse Oximetry 99%, Room air            Constitutional      General appearance:  Comfortable            Eyes      Conjunctiva:  Bilateral: Normal            ENMT      Nasal cavity:           Nostril:  Bilateral: Septal deviation         Discharge:  Bilateral: None      Throat:  Other (Mallampati class III)            Neck      Enlarged nodes:  Bilateral: None      Thyroid - size:  Normal            Respiratory      Work of breathing:  Normal      Auscultation:  Bilateral: Normal            Cardiovascular      Rhythm:  regular      Heart sounds:  NORMAL: S1, S2            Gastrointestinal      Abdomen description:  Normal            Extremity      Radial pulse:  Bilateral: Other (No clubbing.  No cyanosis.  No edema)            Skin      General color:  Normal            Assessment      Obstructive sleep apnea syndrome in adult - G47.33            Insomnia with sleep apnea, unspecified - G47.00, G47.30            Morbid obesity with BMI of 50.0-59.9, adult - E66.01, Z68.43            Notes      The information is as  noted above.  Will try to get the chip and get the     download and check the compliance.      Change the pressures to 12-16 and follow-up in 4 to 6 weeks and as needed and     will get the download of the CPAP.  Depending upon the situation we may need to     consider getting the new CPAP if necessary.  For now we will get the supplies.      I discussed about the sleep hygiene and safety precautions and also gave her the    cognitive behavior therapy for insomnia information online.      Diet and exercise modification and need to check with the primary for further     advice.            Electronically signed by CARLOS ALBERTO VILLALPANDO  02/12/2021 14:33       Disclaimer: Converted document may not contain table formatting or lab diagrams. Please see [employer] for the authenticated document.

## 2021-05-28 NOTE — PROGRESS NOTES
Patient: ROSA GIBSON     Acct: [id number]     Report: #[id number]  UNIT #: [id number]     : 1970    Encounter Date:2021  PRIMARY CARE: BREANA AKERS  ***Signed***  --------------------------------------------------------------------------------------------------------------------  Chief Complaint      Encounter Date      May 24, 2021            Primary Care Provider      BREANA AKERS            Referring Provider      BREANA AKERS            Patient Complaint      Patient is complaining of      pt here for f/u. Sleep apnea            VITALS      Height 5 ft 7 in / 170.18 cm      Weight 334 lbs  / 151.391893 kg      BSA 2.51 m2      BMI 52.3 kg/m2      Temperature 97.3 F / 36.28 C - Temporal      Pulse 87      Respirations 16      Blood Pressure 147/81 Sitting, Right Arm      Pulse Oximetry 97%, room air            HPI      50-year-old white female had a sleep study done and at present patient is on     CPAP with variable pressures.  She is feeling much better and she is very happy.     Denies of having any excessive daytime sleepiness.  She brought the copy of the    report and 100% compliance.  And AHI is running around 2.5.  Other respiratory     related review of the systems, medical history and social history is done and as    documented            ROS      Constitutional:  Denies: Fatigue, Fever, Weight gain, Weight loss, Chills,     Insomnia, Other      Respiratory/Breathing:  Denies: Shortness of air, Wheezing, Cough, Hemoptysis,     Pleuritic pain, Other      Endocrine:  Denies: Polydipsia, Polyuria, Heat/cold intolerance, Abnorml     menstrual pattern, Diabetes, Other      Eyes:  Denies: Blurred vision, Vision Changes, Other      Ears, nose, mouth, throat:  Denies: Mouth lesions, Thrush, Throat pain,     Hoarseness, Allergies/Hay Fever, Post Nasal Drip, Headaches, Recent Head Injury,    Nose Bleeding, Neck Stiffness, Thyroid Mass, Hearing Loss, Ear Fullness, Dry     Mouth, Nasal or  Sinus Pain, Dry Lips, Nasal discharge, Nasal congestion, Other      Cardiovascular:  Denies: Palpitations, Syncope, Claudication, Chest Pain, Wake     up Gasping for air, Leg Swelling, Irregular Heart Rate, Cyanosis, Dyspnea on     Exertion, Other      Gastrointestinal:  Denies: Nausea, Constipation, Diarrhea, Abdominal pain,     Vomiting, Difficulty Swallowing, Reflux/Heartburn, Dysphagia, Jaundice,     Bloating, Melena, Bloody stools, Other      Genitourinary:  Denies: Urinary frequency, Incontinence, Hematuria, Urgency,     Nocturia, Dysuria, Testicular problems, Other      Musculoskeletal:  Denies: Joint Pain, Joint Stiffness, Joint Swelling, Myalgias,    Other      Hematologic/lymphatic:  DENIES: Lymphadenopathy, Bruising, Bleeding tendencies,     Other      Neurological:  Denies: Headache, Numbness, Weakness, Seizures, Other      Psychiatric:  Denies: Anxiety, Appropriate Effect, Depression, Other      Sleep:  No: Excessive daytime sleep, Morning Headache?, Snoring, Insomnia?, Stop    breathing at sleep?, Other      Integumentary:  Denies: Rash, Dry skin, Skin Warm to Touch, Other      Immunologic/Allergic:  Denies: Latex allergy, Seasonal allergies, Asthma,     Urticaria, Eczema, Other      Immunization status:  No: Up to date            FAMILY/SOCIAL/MEDICAL HX      Surgical History:  Yes: Oral Surgery, Other Surgeries (Feet)      Heart - Family Hx:  Father, Grandparent      Diabetes - Family Hx:  Uncle      Cancer/Type - Family Hx:  Father      Other Family Medical History:  Mother      Is Father Still Living?:  Yes      Is Mother Still Living?:  Yes      Social History:  No Tobacco Use, No Alcohol Use, No Recreational Drug use      Smoking status:  Never smoker      Medical History:  Yes: Allergies, Arthritis, Anxiety, High Blood Pressure,     Reflux Disease; No: Sinus Trouble      Psychiatric History      anxiety            PREVENTION      Hx Influenza Vaccination:  Yes      Date Influenza Vaccine Given:   Feb 28, 2021      Influenza Vaccine Declined:  No      2 or More Falls in Past Year?:  No      Fall Past Year with Injury?:  No      Hx Pneumococcal Vaccination:  No      Encouraged to follow-up with:  PCP regarding preventative exams.      Chart initiated by      jana Mcbride CMA            ALLERGIES/MEDICATIONS      Allergies:        Coded Allergies:             NO KNOWN DRUG ALLERGIES (Verified  Allergy, Intermediate, 5/24/21)      Medications    Last Reconciled on 5/24/21 11:34 by CARLOS ALBERTO VILLALPANDO      Hydroxychloroquine Sulfate (Plaquenil) 200 Mg Tablet      400 MG PO QDAY, TAB         Reported         2/12/21       Famotidine (Famotidine) 10 Mg Tablet      10 MG PO HS for 30 Days, #30 TAB         Reported         2/12/21       Loratadine (Loratadine) 10 Mg Tablet      10 MG PO HS, #30 TAB 0 Refills         Reported         2/12/21       traZODone HCl (traZODone HCl) 50 Mg Tablet      50 MG PO HS, #30 TAB 0 Refills         Reported         2/12/21       Triamterene/HCTZ (Dyazide 37.5/25 MG) 1 Each Capsule      1 CAP PO QDAY, CAP         Reported         2/12/21       Meloxicam (Meloxicam*) 15 Mg Tablet      15 MG PO HS, #30 TAB 0 Refills         Reported         2/12/21       Lisinopril* (Lisinopril*) 20 Mg Tablet      20 MG PO HS, #30 TAB 0 Refills         Reported         2/12/21       Metoprolol Succinate (Metoprolol Succinate) 25 Mg Tab.er.24h      25 MG PO QDAY, #30 TAB 0 Refills         Reported         2/12/21      Current Medications      Current Medications Reviewed 5/24/21            EXAM      Patient is alert and oriented.  Comfortable at rest.  Answering questions     appropriately.  In no acute distress      Vtials      Vitals:             Height 5 ft 7 in / 170.18 cm           Weight 334 lbs  / 151.186927 kg           BSA 2.51 m2           BMI 52.3 kg/m2           Temperature 97.3 F / 36.28 C - Temporal           Pulse 87           Respirations 16           Blood Pressure 147/81 Sitting,  Right Arm           Pulse Oximetry 97%, room air            Eyes      Conjunctiva:  Bilateral: Normal            Neck      Enlarged nodes:  Bilateral: None      Thyroid - size:  Normal            Respiratory      Auscultation:  Bilateral: Normal            Cardiovascular      Rhythm:  regular            Gastrointestinal      Abdomen description:  Normal      Hepatosplenomegaly:  none      Mass:  None            Extremity      Radial pulse:  Bilateral: Normal            Skin      General color:  Normal            Assessment      Obstructive sleep apnea syndrome in adult - G47.33            Morbid obesity with BMI of 50.0-59.9, adult - E66.01, Z68.43            Notes      Patient is doing very well and comfortable is in the Pap therapy regularly.      Diet and exercise modification and follow with the primary team physician along     with other medical problems.      Follow-up as needed and in 1 year with a download of the CPAP to check the     compliance            Electronically signed by CARLOS ALBERTO VILLALPANDO  05/24/2021 11:34       Disclaimer: Converted document may not contain table formatting or lab diagrams. Please see [employer] for the authenticated document.

## 2021-06-04 ENCOUNTER — HOSPITAL ENCOUNTER (OUTPATIENT)
Dept: OTHER | Facility: HOSPITAL | Age: 51
Setting detail: RECURRING SERIES
Discharge: STILL ADMITTED OR ADMINISTRATIVE | End: 2021-06-04
Attending: ORTHOPAEDIC SURGERY

## 2021-06-05 NOTE — H&P
History and Physical      Patient Name: Paola Sow   Patient ID: [id number]   Sex: Female   YOB: 1970    Primary Care Provider: Laurence PALOMO   Referring Provider: Laurence PALOMO    Visit Date: May 27, 2021    Provider: Khoa Lindsey MD   Location: [facility]   Location Address: [address] [place] [address]  [id number]   Location Phone: [phone]          Chief Complaint  · Right shoulder pain  · Right wrist pain      History Of Present Illness  Paola Sow is a 50 year old /White female who presents today to [facility].      The patient presents here today for evaluation of her right shoulder and right wrist. The patient states she has a history of carpal tunnel but lately has had a different feeling to her wrist and has decreased  strength. She states she injured her shoulder several years ago trying to pull a tarp of leaves that was treated with physical therapy. She states she has recently started having pain in her shoulder again. She wears a brace at night for carpal tunnel and has had carpal tunnel injections and shoulder injections.       Past Medical History  Anxiety; Arthritis; Asthma; Hemorrhoids; High blood pressure; Reflux Disease         Medication List  famotidine 20 mg oral tablet; hydroxychloroquine 200 mg oral tablet; lisinopril 20 mg oral tablet; loratadine 10 mg oral tablet; meloxicam 15 mg oral tablet; metoprolol succinate 25 mg oral tablet extended release 24 hr; trazodone 50 mg oral tablet; triamterene-hydrochlorothiazid 37.5-25 mg oral tablet; Vitamin D2 1,250 mcg (50,000 unit) oral capsule; Vitamin D3 25 mcg (1,000 unit) oral capsule         Allergy List  NO KNOWN DRUG ALLERGIES         Family Medical History  Heart Disease         Social History  Tobacco (Never)         Immunizations  Name Date Admin   Influenza 10/09/2020   Influenza 10/01/2019         Review of Systems  · Constitutional  o Denies  o :  "fever, chills, weight loss  · Cardiovascular  o Denies  o : chest pain, shortness of breath  · Gastrointestinal  o Denies  o : liver disease, heartburn, nausea, blood in stools  · Genitourinary  o Denies  o : painful urination, blood in urine  · Integument  o Denies  o : rash, itching  · Neurologic  o Denies  o : headache, weakness, loss of consciousness  · Musculoskeletal  o Denies  o : painful, swollen joints  · Psychiatric  o Denies  o : drug/alcohol addiction, anxiety, depression      Vitals  Date Time BP Position Site L\R Cuff Size HR RR TEMP (F) WT  HT  BMI kg/m2 BSA m2 O2 Sat FR L/min FiO2        05/27/2021 01:16 PM         330lbs 0oz 5'  7\" 51.68 2.66             Physical Examination  · Constitutional  o Appearance  o : well developed, well-nourished, no obvious deformities present  · Head and Face  o Head  o :   § Inspection  § : normocephalic  o Face  o :   § Inspection  § : no facial lesions  · Eyes  o Conjunctivae  o : conjunctivae normal  o Sclerae  o : sclerae white  · Ears, Nose, Mouth and Throat  o Ears  o :   § External Ears  § : appearance within normal limits  § Hearing  § : intact  o Nose  o :   § External Nose  § : appearance normal  · Neck  o Inspection/Palpation  o : normal appearance  o Range of Motion  o : full range of motion  · Respiratory  o Respiratory Effort  o : breathing unlabored  o Inspection of Chest  o : normal appearance  o Auscultation of Lungs  o : no audible wheezing or rales  · Cardiovascular  o Heart  o : regular rate  · Gastrointestinal  o Abdominal Examination  o : soft and non-tender  · Skin and Subcutaneous Tissue  o General Inspection  o : intact, no rashes  · Psychiatric  o General  o : Alert and oriented x3  o Judgement and Insight  o : judgment and insight intact  o Mood and Affect  o : mood normal, affect appropriate  · Right Shoulder  o Inspection  o : . Abduction 160. ER 80. IR 45. Positive Neer and Hawkin's. 5/5 rotator cuff strength. IR to L4. Positive " lift off. Pain with cross arm adduction.   · Right Wrist  o Inspection  o : Tender over the right thumb CMC joint. Sensation to light touch median, radial, ulnar nerve. Positive AIN, PIN, ulnar nerve. Positive pulses. Positive Tinel and compression testing.   · Injection Note/Aspiration Note  o Site  o : right shoulder  o Procedure  o : Procedure: After educating the patient, patient gave consent for procedure. After using Chloraprep, the joint space was injected. The patient tolerated the procedure well.  o Medication  o : 80 mg of DepoMedrol with 9cc of 1% Lidocaine  · Imaging  o Imaging  o : 4/30/2021 [facility] [[RIGHT SHOULDER X-RAYS]] AC joint hypertrophic changes otherwise negative right shoulder. [[RIGHT WRIST X-RAY]] Negative right wrist.           Assessment  · Carpal tunnel syndrome of right wrist     354.0/G56.01  · Right shoulder pain, unspecified chronicity     719.41/M25.511  · Right wrist pain     719.43/M25.531  · Right Shoulder Tendinitis     726.90/M77.9  · Shoulder impingement syndrome, right     726.2/M75.41  · Right CMC Osteoarthritis     715.90/M19.90      Plan  · Orders  o Depo-Medrol injection 80mg () - - 05/28/2021   Lot [id number] Exp 12 01 2022 [employer] Administered by DICKSON SALINAS MD  o Shoulder Intra-articular Injection without US Guidance [facility] ([id number]) - - 05/28/2021   Lot [id number] Exp 11 01 2022 [employer] Administered by DICKSON SALINAS MD  · Medications  o Medications have been Reconciled  o Transition of Care or Provider Policy  · Instructions  o Dr. Salinas saw and examined the patient and agrees with plan.   o X-rays reviewed by Dr. Salinas.  o Reviewed the patient's Past Medical, Social, and Family history as well as the ROS at today's visit, no changes.  o Call or return if worsening symptoms.  o Follow up in 6 weeks.  o The above service was scribed by Carmela Mclaughlin on my behalf and I attest to the accuracy of the note. jsb  o Discussed the risks and benefits of a  shoulder injection. The patient expressed understanding and wished to proceed. She tolerated the injection well. Thumb-o-prene brace was given today. The patient was advised to wear her carpal tunnel brace at night and the thumb brace during the day. An order for physical therapy given today.             Electronically Signed by: Carmela Mclaughlin MA -Author on May 28, 2021 11:42:25 AM  Electronically Co-signed by: Khoa Lindsey MD -Reviewer on May 31, 2021 02:58:03 PM

## 2021-06-07 ENCOUNTER — TRANSCRIBE ORDERS (OUTPATIENT)
Dept: PHYSICAL THERAPY | Facility: CLINIC | Age: 51
End: 2021-06-07

## 2021-06-07 DIAGNOSIS — M77.8 TENDONITIS OF SHOULDER, RIGHT: Primary | ICD-10-CM

## 2021-06-07 DIAGNOSIS — M75.41 IMPINGEMENT SYNDROME OF RIGHT SHOULDER: ICD-10-CM

## 2021-06-11 ENCOUNTER — TREATMENT (OUTPATIENT)
Dept: PHYSICAL THERAPY | Facility: CLINIC | Age: 51
End: 2021-06-11

## 2021-06-11 DIAGNOSIS — M77.8 SHOULDER TENDONITIS, RIGHT: Primary | ICD-10-CM

## 2021-06-11 DIAGNOSIS — M25.511 RIGHT SHOULDER PAIN, UNSPECIFIED CHRONICITY: ICD-10-CM

## 2021-06-11 DIAGNOSIS — M25.611 DECREASED RANGE OF MOTION OF RIGHT SHOULDER: ICD-10-CM

## 2021-06-11 DIAGNOSIS — M75.41 IMPINGEMENT SYNDROME OF RIGHT SHOULDER: ICD-10-CM

## 2021-06-11 PROCEDURE — 97140 MANUAL THERAPY 1/> REGIONS: CPT | Performed by: OCCUPATIONAL THERAPIST

## 2021-06-11 PROCEDURE — 97110 THERAPEUTIC EXERCISES: CPT | Performed by: OCCUPATIONAL THERAPIST

## 2021-06-11 NOTE — PROGRESS NOTES
"Re-Assessment / Re-Certification        Patient: Paola Sow   : 1970  Diagnosis/ICD-10 Code:  Shoulder tendonitis, right [M77.8]  Referring practitioner: Khoa Lindsey MD  Date of Initial Visit: Type: THERAPY  Noted: 2021  Today's Date: 2021  Patient seen for 1 sessions      Subjective:   Paola Sow reports: \"It feels good today\"  Subjective Questionnaire: QuickDASH: 28/55 (40-59%)  Clinical Progress: improved  Home Program Compliance: Yes  Treatment has included: therapeutic exercise, manual therapy and cryotherapy    Subjective Evaluation    History of Present Illness  Mechanism of injury: Pt is a 49 y/o RHD female who presents to therapy with shoulder pain and occasional wrist pain. Pt reports 3 years ago she was pulling a tarp full of wet leaves. She reports later that day she began feeling pain in her right shoulder. Pt reports experiencing intermittent pain since then. She states that she performs the physical therapy exercises that she was originally provided and it normally takes the pain away but this time it didn't completely get rid of it. Pt also experiences pain in the right thumb. Pt reports this is new. MD placed pt in brace immobilize thumb. Pt also reports receiving cortisone injection in shoulder on .    Subjective comment: \"It's feeling good today\"Pain  Current pain ratin    Patient Goals  Patient goals for therapy: decreased pain, increased motion, increased strength, independence with ADLs/IADLs, return to sport/leisure activities and return to work         Objective          Static Posture     Shoulders  Rounded.    Active Range of Motion     Right Shoulder   Abduction: 135 degrees     Additional Active Range of Motion Details  IR 3 inch difference behind back    Strength/Myotome Testing     Right Shoulder     Planes of Motion   Flexion: 5   Extension: 5   Abduction: 5   Adduction: 5   External rotation at 0°: 5   External rotation at 45°: 5   External " rotation at 90°: 5   External rotation BTH: 5   Internal rotation at 0°: 5   Internal rotation at 45°: 5   Internal rotation at 90°: 5   Internal rotation BTB: 5   Horizontal abduction: 4   Horizontal adduction: 5     Tests     Right Shoulder   Positive Hawkin's.       Assessment & Plan     Assessment  Impairments: impaired physical strength and pain with function  Assessment details: Pt will benefit from skilled OT secondary to decreased shoulder strength and increased pain which limits pt ability to complete self care tasks.  Prognosis: good  Functional Limitations: carrying objects, lifting, sleeping, pulling, pushing, reaching behind back and reaching overhead  Goals  Plan Goals: 1. The patient complains of pain in the right shoulder.    LTG 1: 6 weeks:  The patient will report a pain rating of 1/10 or better when reaching behind back in order to improve sleep quality and tolerance to performance of activities of daily living.    STATUS:  New   STG 1a: 4 weeks:  The patient will report a pain rating of 3/10 or better.     STATUS:  New   TREATMENT: Manual therapy, therapeutic exercise, neuromuscular re-education, home exercise instruction, and modalities as needed to include: electrical stimulation, ultrasound, moist heat, fluidotherapy, paraffin, and ice.    2. The patient has limited ROM of the right shoulder.    LTG 2: 6 weeks:  The patient will demonstrate 1 inch difference in IR behind back to allow the patient to wash back.    STATUS:  New    STG 2a: 4 weeks:  The patient will demonstrate 2 inch difference in IR.    STATUS:  New   TREATMENT: Manual therapy, therapeutic exercise, home exercise instruction, and modalities as needed to include: electrical stimulation, ultrasound, moist heat, fluidotherapy and ice.     3. The patient has limited strength of the right shoulder.   LTG 3: 6 weeks:  The patient will demonstrate 5/5 shoulder strength in order to complete job tasks.    STATUS:  New   STG 3a: 4 weeks:   The patient will demonstrate 4+/5 horizontal adduction.    STATUS:  New   TREATMENT: Manual therapy, therapeutic exercise, home exercise instruction, and modalities as needed to include: electrical stimulation, ultrasound, moist heat, fluidotherapy and ice.    4. Carrying, Moving, and Handling Objects Functional Limitation     LTG 4: 6 weeks:  The patient will demonstrate 1-19% limitation by achieving a score of 19 on the Quick DASH.    STATUS:  New   STG 4a: 4 weeks:  The patient will demonstrate 20-39% limitation by achieving a score of 27 on the Quick DASH.      STATUS:  Not met   TREATMENT:  Manual therapy, therapeutic exercise, home exercise instruction, and modalities as needed to include: electrical stimulation, ultrasound, moist heat, fluidotherapy and ice.      Plan  Therapy options: will be seen for skilled physical therapy services  Planned modality interventions: cryotherapy and TENS  Planned therapy interventions: flexibility, functional ROM exercises, home exercise program, manual therapy, postural training, strengthening and stretching  Frequency: 3x week  Duration in weeks: 12  Treatment plan discussed with: patient      Progress toward previous goals: Not Met        Recommendations: Continue as planned  Timeframe: 6 weeks  Prognosis to achieve goals: good    OT SIGNATURE: Davion Barger OT   DATE TREATMENT INITIATED: 6/11/2021  Certification Period: 6/11/2021 - 9/9/2021        Based upon review of the patient's progress and continued therapy plan, it is my medical opinion that Paola Sow should continue physical therapy treatment at [facility] PH[facility]  [address] [place] [id number] [phone].    Signature: __________________________________  Khoa Lindsey MD    Timed:  Manual Therapy:    10     mins  27462;  Therapeutic Exercise:    15     mins  20565;     Neuromuscular Amanad:    0    mins  88911;    Therapeutic Activity:     0      mins  30222;     Ultrasound:     0     mins  86277;    Electrical Stimulation:    0     mins  48323 ( );    Untimed:  Electrical Stimulation:    0     mins  40441 ( );  Fluidotherapy     0     mins  64229      Timed Treatment:   25   mins   Total Treatment:     25   mins

## 2021-06-14 ENCOUNTER — TREATMENT (OUTPATIENT)
Dept: PHYSICAL THERAPY | Facility: CLINIC | Age: 51
End: 2021-06-14

## 2021-06-14 DIAGNOSIS — M77.8 SHOULDER TENDONITIS, RIGHT: Primary | ICD-10-CM

## 2021-06-14 DIAGNOSIS — M75.41 IMPINGEMENT SYNDROME OF RIGHT SHOULDER: ICD-10-CM

## 2021-06-14 DIAGNOSIS — M25.611 DECREASED RANGE OF MOTION OF RIGHT SHOULDER: ICD-10-CM

## 2021-06-14 DIAGNOSIS — M25.511 RIGHT SHOULDER PAIN, UNSPECIFIED CHRONICITY: ICD-10-CM

## 2021-06-14 PROCEDURE — 97110 THERAPEUTIC EXERCISES: CPT | Performed by: OCCUPATIONAL THERAPIST

## 2021-06-14 NOTE — PROGRESS NOTES
" Occupational Therapy Daily Progress Note        Patient: Paola Sow   : 1970  Diagnosis/ICD-10 Code:  Shoulder tendonitis, right [M77.8]  Referring practitioner: Khoa Lindsey MD  Date of Initial Visit: Type: THERAPY  Noted: 2021  Today's Date: 2021  Patient seen for 2 sessions             Subjective Evaluation    Pain  Current pain ratin         Paola Sow reports: \"It didn't hurt at all Friday after I left\"    Objective          Static Posture     Shoulders  Rounded.    Active Range of Motion     Right Shoulder   Abduction: 135 degrees     Additional Active Range of Motion Details  IR 3 inch difference behind back    Strength/Myotome Testing     Right Shoulder     Planes of Motion   Flexion: 5   Extension: 5   Abduction: 5   Adduction: 5   External rotation at 0°: 5   External rotation at 45°: 5   External rotation at 90°: 5   External rotation BTH: 5   Internal rotation at 0°: 5   Internal rotation at 45°: 5   Internal rotation at 90°: 5   Internal rotation BTB: 5   Horizontal abduction: 4   Horizontal adduction: 5     Tests     Right Shoulder   Positive Hawkin's.       Objective copied from previous note. No changes.  See Exercise, Manual, and Modality Logs for complete treatment.       Assessment/Plan  Continue with plan of care.  Progress per Plan of Care           Timed:  Manual Therapy:    0     mins  40582;  Therapeutic Exercise:    23     mins  07496;     Neuromuscular Amanda:    0    mins  11164;    Therapeutic Activity:     0     mins  97306;     Ultrasound:     0     mins  10747;    Electrical Stimulation:    0     mins  24832;    Untimed:  Electrical Stimulation:    0     mins  82008 (MC );  Fluidotherapy     0     mins  53167  Hot/cold pack     7     mins  69513    Timed Treatment:   23   mins   Total Treatment:     30   mins        Davion Barger OT  Occupational Therapist  "

## 2021-06-18 ENCOUNTER — TREATMENT (OUTPATIENT)
Dept: PHYSICAL THERAPY | Facility: CLINIC | Age: 51
End: 2021-06-18

## 2021-06-18 DIAGNOSIS — M25.511 RIGHT SHOULDER PAIN, UNSPECIFIED CHRONICITY: ICD-10-CM

## 2021-06-18 DIAGNOSIS — M77.8 SHOULDER TENDONITIS, RIGHT: Primary | ICD-10-CM

## 2021-06-18 DIAGNOSIS — M25.611 DECREASED RANGE OF MOTION OF RIGHT SHOULDER: ICD-10-CM

## 2021-06-18 DIAGNOSIS — M75.41 IMPINGEMENT SYNDROME OF RIGHT SHOULDER: ICD-10-CM

## 2021-06-18 PROCEDURE — 97110 THERAPEUTIC EXERCISES: CPT | Performed by: OCCUPATIONAL THERAPIST

## 2021-06-18 NOTE — PROGRESS NOTES
" Occupational Therapy Daily Progress Note        Patient: Paola Sow   : 1970  Diagnosis/ICD-10 Code:  Shoulder tendonitis, right [M77.8]  Referring practitioner: Khoa Lindsey MD  Date of Initial Visit: Type: THERAPY  Noted: 2021  Today's Date: 2021  Patient seen for 3 sessions             Subjective Evaluation    Pain  Current pain ratin         Paola Sow reports: \"It's doing good\"    Objective          Static Posture     Shoulders  Rounded.    Active Range of Motion     Right Shoulder   Abduction: 135 degrees     Additional Active Range of Motion Details  IR 3 inch difference behind back    Strength/Myotome Testing     Right Shoulder     Planes of Motion   Flexion: 5   Extension: 5   Abduction: 5   Adduction: 5   External rotation at 0°: 5   External rotation at 45°: 5   External rotation at 90°: 5   External rotation BTH: 5   Internal rotation at 0°: 5   Internal rotation at 45°: 5   Internal rotation at 90°: 5   Internal rotation BTB: 5   Horizontal abduction: 4   Horizontal adduction: 5     Tests     Right Shoulder   Positive Hawkin's.       Objective copied from previous note. No changes.  See Exercise, Manual, and Modality Logs for complete treatment.       Assessment/Plan    Progress strengthening /stabilization /functional activity           Timed:  Manual Therapy:    0     mins  68559;  Therapeutic Exercise:    23     mins  59976;     Neuromuscular Amanda:    0    mins  76264;    Therapeutic Activity:     0     mins  30982;     Ultrasound:     0     mins  41999;    Electrical Stimulation:    0     mins  86383;    Untimed:  Electrical Stimulation:    0     mins  77292 ( );  Fluidotherapy     0     mins  60727  Hot/cold pack     7     mins  31215    Timed Treatment:   23   mins   Total Treatment:     30   mins        Davion Barger OT  Occupational Therapist  "

## 2021-06-25 ENCOUNTER — TREATMENT (OUTPATIENT)
Dept: PHYSICAL THERAPY | Facility: CLINIC | Age: 51
End: 2021-06-25

## 2021-06-25 DIAGNOSIS — M25.511 RIGHT SHOULDER PAIN, UNSPECIFIED CHRONICITY: ICD-10-CM

## 2021-06-25 DIAGNOSIS — M77.8 SHOULDER TENDONITIS, RIGHT: Primary | ICD-10-CM

## 2021-06-25 DIAGNOSIS — M25.611 DECREASED RANGE OF MOTION OF RIGHT SHOULDER: ICD-10-CM

## 2021-06-25 DIAGNOSIS — M75.41 IMPINGEMENT SYNDROME OF RIGHT SHOULDER: ICD-10-CM

## 2021-06-25 PROCEDURE — 97110 THERAPEUTIC EXERCISES: CPT | Performed by: OCCUPATIONAL THERAPIST

## 2021-06-25 NOTE — PROGRESS NOTES
" Occupational Therapy Daily Progress Note        Patient: Paola Sow   : 1970  Diagnosis/ICD-10 Code:  Shoulder tendonitis, right [M77.8]  Referring practitioner: Khoa Lindsey MD  Date of Initial Visit: Type: THERAPY  Noted: 2021  Today's Date: 2021  Patient seen for 4 sessions             Subjective   Paola Sow reports: \"It's feeling good\"    Objective          Static Posture     Shoulders  Rounded.    Active Range of Motion   Left Shoulder   Abduction: 140 degrees     Right Shoulder   Abduction: 140 degrees     Additional Active Range of Motion Details  IR 3 inch difference behind back    Strength/Myotome Testing     Right Shoulder     Planes of Motion   Flexion: 5   Extension: 5   Abduction: 5   Adduction: 5   External rotation at 0°: 5   External rotation at 45°: 5   External rotation at 90°: 5   External rotation BTH: 5   Internal rotation at 0°: 5   Internal rotation at 45°: 5   Internal rotation at 90°: 5   Internal rotation BTB: 5   Horizontal abduction: 4   Horizontal adduction: 5     Tests     Right Shoulder   Positive Hawkin's.         See Exercise, Manual, and Modality Logs for complete treatment.       Assessment/Plan    Progress per Plan of Care           Timed:  Manual Therapy:    0     mins  27644;  Therapeutic Exercise:    23     mins  54764;     Neuromuscular Amanda:    0    mins  05736;    Therapeutic Activity:     0     mins  19337;     Ultrasound:     0     mins  80653;    Electrical Stimulation:    0     mins  86742;    Untimed:  Electrical Stimulation:    0     mins  99494 ( );  Fluidotherapy     0     mins  69058  Hot/cold pack     7     mins  18657    Timed Treatment:   23   mins   Total Treatment:     30   mins        Davion Barger OT  Occupational Therapist  "

## 2021-06-28 ENCOUNTER — TREATMENT (OUTPATIENT)
Dept: PHYSICAL THERAPY | Facility: CLINIC | Age: 51
End: 2021-06-28

## 2021-06-28 DIAGNOSIS — M75.41 IMPINGEMENT SYNDROME OF RIGHT SHOULDER: ICD-10-CM

## 2021-06-28 DIAGNOSIS — M25.511 RIGHT SHOULDER PAIN, UNSPECIFIED CHRONICITY: ICD-10-CM

## 2021-06-28 DIAGNOSIS — M25.611 DECREASED RANGE OF MOTION OF RIGHT SHOULDER: ICD-10-CM

## 2021-06-28 DIAGNOSIS — M77.8 SHOULDER TENDONITIS, RIGHT: Primary | ICD-10-CM

## 2021-06-28 PROCEDURE — 97110 THERAPEUTIC EXERCISES: CPT | Performed by: OCCUPATIONAL THERAPIST

## 2021-06-28 RX ORDER — LISINOPRIL 20 MG/1
TABLET ORAL
Qty: 90 TABLET | Refills: 0 | Status: SHIPPED | OUTPATIENT
Start: 2021-06-28 | End: 2021-09-20

## 2021-06-28 RX ORDER — TRIAMTERENE AND HYDROCHLOROTHIAZIDE 37.5; 25 MG/1; MG/1
1 TABLET ORAL DAILY
Qty: 90 TABLET | Refills: 1 | Status: SHIPPED | OUTPATIENT
Start: 2021-06-28 | End: 2022-09-03 | Stop reason: HOSPADM

## 2021-06-28 NOTE — PROGRESS NOTES
" Occupational Therapy Daily Progress Note        Patient: Paola Sow   : 1970  Diagnosis/ICD-10 Code:  Shoulder tendonitis, right [M77.8]  Referring practitioner: Khoa Lindsey MD  Date of Initial Visit: Type: THERAPY  Noted: 2021  Today's Date: 2021  Patient seen for 5 sessions             Subjective Evaluation    Pain  Current pain ratin         Paola Sow reports: \"It's doing good and I worked with it all weekend\"    Objective          Static Posture     Shoulders  Rounded.    Active Range of Motion   Left Shoulder   Abduction: 140 degrees     Right Shoulder   Abduction: 140 degrees     Additional Active Range of Motion Details  IR 3 inch difference behind back    Strength/Myotome Testing     Right Shoulder     Planes of Motion   Flexion: 5   Extension: 5   Abduction: 5   Adduction: 5   External rotation at 0°: 5   External rotation at 45°: 5   External rotation at 90°: 5   External rotation BTH: 5   Internal rotation at 0°: 5   Internal rotation at 45°: 5   Internal rotation at 90°: 5   Internal rotation BTB: 5   Horizontal abduction: 4   Horizontal adduction: 5     Tests     Right Shoulder   Positive Hawkin's.         See Exercise, Manual, and Modality Logs for complete treatment.     Objective copied from previous note. No changes  Assessment/Plan    Progress per Plan of Care           Timed:  Manual Therapy:    0     mins  20901;  Therapeutic Exercise:    23     mins  67187;     Neuromuscular Amanda:    0    mins  82622;    Therapeutic Activity:     0     mins  41258;     Ultrasound:     0     mins  22870;    Electrical Stimulation:    0     mins  25190;    Untimed:  Electrical Stimulation:    0     mins  30178 (MC );  Fluidotherapy     0     mins  43431  Hot/cold pack     7     mins  73548    Timed Treatment:   23   mins   Total Treatment:     30   mins        Davion Barger OT  Occupational Therapist  "

## 2021-07-02 ENCOUNTER — TREATMENT (OUTPATIENT)
Dept: PHYSICAL THERAPY | Facility: CLINIC | Age: 51
End: 2021-07-02

## 2021-07-02 DIAGNOSIS — M25.511 RIGHT SHOULDER PAIN, UNSPECIFIED CHRONICITY: ICD-10-CM

## 2021-07-02 DIAGNOSIS — M77.8 SHOULDER TENDONITIS, RIGHT: Primary | ICD-10-CM

## 2021-07-02 DIAGNOSIS — M75.41 IMPINGEMENT SYNDROME OF RIGHT SHOULDER: ICD-10-CM

## 2021-07-02 DIAGNOSIS — M25.611 DECREASED RANGE OF MOTION OF RIGHT SHOULDER: ICD-10-CM

## 2021-07-02 PROCEDURE — 97110 THERAPEUTIC EXERCISES: CPT | Performed by: OCCUPATIONAL THERAPIST

## 2021-07-02 NOTE — PROGRESS NOTES
" Occupational Therapy Daily Progress Note        Patient: Paola Sow   : 1970  Diagnosis/ICD-10 Code:  Shoulder tendonitis, right [M77.8]  Referring practitioner: Khoa Lindsey MD  Date of Initial Visit: Type: THERAPY  Noted: 2021  Today's Date: 2021  Patient seen for 6 sessions             Subjective Evaluation    Pain  Current pain ratin         Paola Sow reports: \"It's doing good\"    Objective   Objective copied from previous note. No changes.  See Exercise, Manual, and Modality Logs for complete treatment.       Assessment/Plan    Progress per Plan of Care           Timed:  Manual Therapy:    0     mins  08208;  Therapeutic Exercise:    23     mins  69853;     Neuromuscular Amanda:    0    mins  10754;    Therapeutic Activity:     0     mins  76145;     Ultrasound:     0     mins  56300;    Electrical Stimulation:    0     mins  25905;    Untimed:  Electrical Stimulation:    0     mins  92111 ( );  Fluidotherapy     0     mins  34243  Hot/cold pack     0     mins  13174    Timed Treatment:   23   mins   Total Treatment:     23   mins        Davion Barger OT  Occupational Therapist  "

## 2021-07-09 ENCOUNTER — TREATMENT (OUTPATIENT)
Dept: PHYSICAL THERAPY | Facility: CLINIC | Age: 51
End: 2021-07-09

## 2021-07-09 DIAGNOSIS — M77.8 SHOULDER TENDONITIS, RIGHT: Primary | ICD-10-CM

## 2021-07-09 DIAGNOSIS — M75.41 IMPINGEMENT SYNDROME OF RIGHT SHOULDER: ICD-10-CM

## 2021-07-09 DIAGNOSIS — M25.511 RIGHT SHOULDER PAIN, UNSPECIFIED CHRONICITY: ICD-10-CM

## 2021-07-09 DIAGNOSIS — M25.611 DECREASED RANGE OF MOTION OF RIGHT SHOULDER: ICD-10-CM

## 2021-07-09 PROCEDURE — 97110 THERAPEUTIC EXERCISES: CPT | Performed by: OCCUPATIONAL THERAPIST

## 2021-07-09 NOTE — PROGRESS NOTES
Patient: Paola Sow   : 1970  Diagnosis/ICD-10 Code:  Shoulder tendonitis, right [M77.8]  Referring practitioner: Khoa Lindsey MD  Date of Initial Visit: Type: THERAPY  Noted: 2021  Today's Date: 2021  Patient seen for 7 sessions      Subjective:   Subjective Questionnaire: QuickDASH: 22/55 (20-39%)  Clinical Progress: improved  Home Program Compliance: Yes  Treatment has included: therapeutic exercise, manual therapy and cryotherapy    Subjective Evaluation    Pain  Current pain ratin         Objective          Static Posture     Shoulders  Rounded.    Active Range of Motion   Left Shoulder   Abduction: 140 degrees     Right Shoulder   Abduction: 140 degrees     Additional Active Range of Motion Details  IR 2 inch difference behind back    Strength/Myotome Testing     Right Shoulder     Planes of Motion   Flexion: 5   Extension: 5   Abduction: 5   Adduction: 5   External rotation at 0°: 5   External rotation at 45°: 5   External rotation at 90°: 5   External rotation BTH: 5   Internal rotation at 0°: 5   Internal rotation at 45°: 5   Internal rotation at 90°: 5   Internal rotation BTB: 5   Horizontal abduction: 5   Horizontal adduction: 5     Tests     Right Shoulder   Positive Hawkin's.       Assessment & Plan     Assessment  Impairments: impaired physical strength and pain with function  Prognosis: good  Functional Limitations: carrying objects, lifting, sleeping, pulling, pushing, reaching behind back and reaching overhead  Goals  Plan Goals: 1. The patient complains of pain in the right shoulder.    LTG 1: 6 weeks:  The patient will report a pain rating of 1/10 or better when reaching behind back in order to improve sleep quality and tolerance to performance of activities of daily living.    STATUS:  Met   STG 1a: 4 weeks:  The patient will report a pain rating of 3/10 or better.     STATUS:  Met   TREATMENT: Manual therapy, therapeutic exercise, neuromuscular re-education, home  exercise instruction, and modalities as needed to include: electrical stimulation, ultrasound, moist heat, fluidotherapy, paraffin, and ice.    2. The patient has limited ROM of the right shoulder.    LTG 2: 6 weeks:  The patient will demonstrate 1 inch difference in IR behind back to allow the patient to wash back.    STATUS:  New    STG 2a: 4 weeks:  The patient will demonstrate 2 inch difference in IR.    STATUS:  Met   TREATMENT: Manual therapy, therapeutic exercise, home exercise instruction, and modalities as needed to include: electrical stimulation, ultrasound, moist heat, fluidotherapy and ice.     3. The patient has limited strength of the right shoulder.   LTG 3: 6 weeks:  The patient will demonstrate 5/5 shoulder strength in order to complete job tasks.    STATUS:  Met   STG 3a: 4 weeks:  The patient will demonstrate 4+/5 horizontal adduction.    STATUS:  Met   TREATMENT: Manual therapy, therapeutic exercise, home exercise instruction, and modalities as needed to include: electrical stimulation, ultrasound, moist heat, fluidotherapy and ice.    4. Carrying, Moving, and Handling Objects Functional Limitation     LTG 4: 6 weeks:  The patient will demonstrate 1-19% limitation by achieving a score of 19 on the Quick DASH.    STATUS:  Not met (pt reports score is more related to wrist than shoulder)   STG 4a: 4 weeks:  The patient will demonstrate 20-39% limitation by achieving a score of 27 on the Quick DASH.      STATUS: Met   TREATMENT:  Manual therapy, therapeutic exercise, home exercise instruction, and modalities as needed to include: electrical stimulation, ultrasound, moist heat, fluidotherapy and ice.      Plan  Therapy options: will be seen for skilled physical therapy services  Planned modality interventions: cryotherapy and TENS  Planned therapy interventions: flexibility, functional ROM exercises, home exercise program, manual therapy, postural training, strengthening and stretching  Frequency:  3x week  Duration in weeks: 12  Treatment plan discussed with: patient      Progress toward previous goals: Partially Met      Recommendations: Continue as planned  Timeframe: 1 month  Prognosis to achieve goals: good    OT SIGNATURE: Davion Barger OT   DATE TREATMENT INITIATED: 7/9/2021  Certification Period: 7/9/2021 - 10/7/2021        Based upon review of the patient's progress and continued therapy plan, it is my medical opinion that Paola Sow should continue physical therapy treatment at [facility]  [address] [place] [id number]  [phone].    Signature: __________________________________  Khoa Lindsey MD    Timed:  Manual Therapy:    0     mins  36875;  Therapeutic Exercise:    23     mins  97404;     Neuromuscular Amanda:    0    mins  85358;    Therapeutic Activity:     0     mins  93997;     Ultrasound:     0     mins  19136;    Electrical Stimulation:    0     mins  40409 ( );    Untimed:  Electrical Stimulation:    0     mins  45934 ( );  Fluidotherapy     0     mins  22145  Hot/cold pack     0     mins  69912    Timed Treatment:   23   mins   Total Treatment:     23   mins  Please sign and return via fax to [phone]  . Thank you, [facility].

## 2021-07-12 ENCOUNTER — TREATMENT (OUTPATIENT)
Dept: PHYSICAL THERAPY | Facility: CLINIC | Age: 51
End: 2021-07-12

## 2021-07-12 DIAGNOSIS — M25.511 RIGHT SHOULDER PAIN, UNSPECIFIED CHRONICITY: ICD-10-CM

## 2021-07-12 DIAGNOSIS — M77.8 SHOULDER TENDONITIS, RIGHT: Primary | ICD-10-CM

## 2021-07-12 DIAGNOSIS — M25.611 DECREASED RANGE OF MOTION OF RIGHT SHOULDER: ICD-10-CM

## 2021-07-12 DIAGNOSIS — M75.41 IMPINGEMENT SYNDROME OF RIGHT SHOULDER: ICD-10-CM

## 2021-07-12 PROCEDURE — 97110 THERAPEUTIC EXERCISES: CPT | Performed by: OCCUPATIONAL THERAPIST

## 2021-07-12 NOTE — PROGRESS NOTES
" Occupational Therapy Daily Progress Note        Patient: Paola Sow   : 1970  Diagnosis/ICD-10 Code:  Shoulder tendonitis, right [M77.8]  Referring practitioner: Khoa Lindsey MD  Date of Initial Visit: Type: THERAPY  Noted: 2021  Today's Date: 2021  Patient seen for 8 sessions             Subjective   Paola Sow reports: \"No problems with the shoulder\"    Objective          Static Posture     Shoulders  Rounded.    Active Range of Motion   Left Shoulder   Abduction: 140 degrees     Right Shoulder   Abduction: 140 degrees     Additional Active Range of Motion Details  IR 2 inch difference behind back    Strength/Myotome Testing     Right Shoulder     Planes of Motion   Flexion: 5   Extension: 5   Abduction: 5   Adduction: 5   External rotation at 0°: 5   External rotation at 45°: 5   External rotation at 90°: 5   External rotation BTH: 5   Internal rotation at 0°: 5   Internal rotation at 45°: 5   Internal rotation at 90°: 5   Internal rotation BTB: 5   Horizontal abduction: 5   Horizontal adduction: 5     Tests     Right Shoulder   Positive Hawkin's.       Copied from previous objective. No changes.  See Exercise, Manual, and Modality Logs for complete treatment.       Assessment/Plan    Anticipate DC next Visit           Timed:  Manual Therapy:    0     mins  60917;  Therapeutic Exercise:    23     mins  14390;     Neuromuscular Amanda:    0    mins  40706;    Therapeutic Activity:     0     mins  55763;     Ultrasound:     0     mins  02774;    Electrical Stimulation:    0     mins  97103;    Untimed:  Electrical Stimulation:    0     mins  50131 ( );  Fluidotherapy     0     mins  69001  Hot/cold pack     0     mins  27991    Timed Treatment:   23   mins   Total Treatment:     23   mins        Davion Barger OT  Occupational Therapist  "

## 2021-07-14 PROBLEM — M75.41 IMPINGEMENT SYNDROME OF RIGHT SHOULDER: Status: ACTIVE | Noted: 2021-07-14

## 2021-07-14 PROBLEM — M18.11 ARTHRITIS OF CARPOMETACARPAL (CMC) JOINT OF RIGHT THUMB: Status: ACTIVE | Noted: 2021-07-14

## 2021-07-14 PROBLEM — G56.01 RIGHT CARPAL TUNNEL SYNDROME: Status: ACTIVE | Noted: 2021-07-14

## 2021-07-14 PROBLEM — M77.8 RIGHT SHOULDER TENDINITIS: Status: ACTIVE | Noted: 2021-07-14

## 2021-07-15 ENCOUNTER — OFFICE VISIT (OUTPATIENT)
Dept: ORTHOPEDIC SURGERY | Facility: CLINIC | Age: 51
End: 2021-07-15

## 2021-07-15 VITALS — HEIGHT: 67 IN | OXYGEN SATURATION: 98 % | WEIGHT: 293 LBS | HEART RATE: 68 BPM | BODY MASS INDEX: 45.99 KG/M2

## 2021-07-15 VITALS — HEIGHT: 67 IN | BODY MASS INDEX: 45.99 KG/M2 | WEIGHT: 293 LBS

## 2021-07-15 DIAGNOSIS — M75.41 IMPINGEMENT SYNDROME OF RIGHT SHOULDER: ICD-10-CM

## 2021-07-15 DIAGNOSIS — G56.01 RIGHT CARPAL TUNNEL SYNDROME: ICD-10-CM

## 2021-07-15 DIAGNOSIS — M18.11 ARTHRITIS OF CARPOMETACARPAL (CMC) JOINT OF RIGHT THUMB: ICD-10-CM

## 2021-07-15 DIAGNOSIS — M77.8 RIGHT SHOULDER TENDINITIS: Primary | ICD-10-CM

## 2021-07-15 PROCEDURE — 20600 DRAIN/INJ JOINT/BURSA W/O US: CPT | Performed by: PHYSICIAN ASSISTANT

## 2021-07-15 PROCEDURE — 99213 OFFICE O/P EST LOW 20 MIN: CPT | Performed by: PHYSICIAN ASSISTANT

## 2021-07-15 RX ORDER — METOPROLOL SUCCINATE 25 MG/1
TABLET, EXTENDED RELEASE ORAL
COMMUNITY
Start: 2021-06-27 | End: 2021-09-20

## 2021-07-15 RX ORDER — LIDOCAINE HYDROCHLORIDE 10 MG/ML
1 INJECTION, SOLUTION INFILTRATION; PERINEURAL
Status: COMPLETED | OUTPATIENT
Start: 2021-07-15 | End: 2021-07-15

## 2021-07-15 RX ORDER — MELOXICAM 15 MG/1
TABLET ORAL
COMMUNITY
Start: 2021-04-30 | End: 2021-10-18

## 2021-07-15 RX ORDER — HYDROXYCHLOROQUINE SULFATE 200 MG/1
TABLET, FILM COATED ORAL
COMMUNITY
Start: 2021-06-26 | End: 2021-09-20 | Stop reason: SDUPTHER

## 2021-07-15 RX ORDER — METHYLPREDNISOLONE ACETATE 80 MG/ML
80 INJECTION, SUSPENSION INTRA-ARTICULAR; INTRALESIONAL; INTRAMUSCULAR; SOFT TISSUE
Status: COMPLETED | OUTPATIENT
Start: 2021-07-15 | End: 2021-07-15

## 2021-07-15 RX ORDER — ERGOCALCIFEROL 1.25 MG/1
2000 CAPSULE ORAL DAILY
COMMUNITY
Start: 2021-05-03 | End: 2022-03-18

## 2021-07-15 RX ADMIN — METHYLPREDNISOLONE ACETATE 80 MG: 80 INJECTION, SUSPENSION INTRA-ARTICULAR; INTRALESIONAL; INTRAMUSCULAR; SOFT TISSUE at 13:52

## 2021-07-15 RX ADMIN — LIDOCAINE HYDROCHLORIDE 1 ML: 10 INJECTION, SOLUTION INFILTRATION; PERINEURAL at 13:52

## 2021-07-15 NOTE — PROGRESS NOTES
"Chief Complaint  Pain of the Right Shoulder and Pain of the Right Wrist    Subjective          Paola Sow is a 50 y.o. female  presents to [facility] for   History of Present Illness    Patient presents for follow-up evaluation of right shoulder tendinitis/impingement and right CMC arthritis/right carpal tunnel syndrome.  Patient was last evaluated by Dr. Lindsey at last visit she received a right shoulder steroid injection and a right thumb brace.  Patient states the right shoulder is feeling much better she has been attending physical therapy with good results she states she has decreased pain and increased strength in the shoulder.  She states her last appointment is tomorrow with therapy and she will exercises.  Patient states she wears the right hand brace with activity she states she has been having pains of her thumb, pain with gripping objects, twisting objects and weakness to the  strength in the base of her thumb.  Patient states Dr. Lindsey discussed injections of the thumb treatment.  No Known Allergies     Social History     Socioeconomic History   • Marital status:      Spouse name: Not on file   • Number of children: Not on file   • Years of education: Not on file   • Highest education level: Not on file   Tobacco Use   • Smoking status: Never Smoker   Substance and Sexual Activity   • Alcohol use: Yes     Comment: occasionally drinks   • Drug use: Never        REVIEW OF SYSTEMS    Constitutional: Denies fevers, chills, weight loss  Cardiovascular: Denies chest pain, shortness of breath  Skin: Denies rashes, acute skin changes  Neurologic: Denies headache, loss of consciousness  MSK: Right shoulder pain, right thumb pain      Objective   Vital Signs:   Pulse 68   Ht 170.2 cm (67\")   Wt (!) 155 kg (342 lb)   SpO2 98%   BMI 53.56 kg/m²     Body mass index is 53.56 kg/m².    Physical Exam    Right thumb: Tenderness palpation of thumb, mild pain with " grind test, mild pain with range of motion, 4 out of 5  strength, neurovascularly intact, full flexion extension abduction abduction of fingers and thumb.    Right shoulder: Nontender to palpation, no pain with range of motion, range of motion intact.  Neurovascularly intact.    Small Joint Arthrocentesis  Consent given by: patient  Site marked: site marked  Timeout: Immediately prior to procedure a time out was called to verify the correct patient, procedure, equipment, support staff and site/side marked as required   Supporting Documentation  Indications: pain   Procedure Details  Location: thumb -   Preparation: Patient was prepped and draped in the usual sterile fashion  Needle gauge: 23 G.  Medications administered: 80 mg methylPREDNISolone acetate 80 MG/ML; 1 mL lidocaine 1 %  Patient tolerance: patient tolerated the procedure well with no immediate complications          Imaging Results (Most Recent)     None           Result Review :   The following data was reviewed by: CELINA Wolfe on 07/15/2021:             Assessment and Plan    Diagnoses and all orders for this visit:    1. Right shoulder tendinitis (Primary)    2. Right carpal tunnel syndrome    3. Impingement syndrome of right shoulder    4. Arthritis of carpometacarpal (CMC) joint of right thumb    Other orders  -     Small Joint Arthrocentesis        Discussed diagnosis and treatment options with the patient, patient was advised to activity as tolerated for her shoulder, may discontinue therapy and start home exercise program.  Patient elected to have right thumb CMC steroid injection which she tolerated well, thumb brace, follow-up in 6 weeks for reevaluation.    Call or return if worsening symptoms.    Follow Up   Return in about 6 weeks (around 8/26/2021).  Patient was given instructions and counseling regarding her condition or for health maintenance advice. Please see specific information pulled into the AVS if appropriate.

## 2021-07-16 ENCOUNTER — TREATMENT (OUTPATIENT)
Dept: PHYSICAL THERAPY | Facility: CLINIC | Age: 51
End: 2021-07-16

## 2021-07-16 DIAGNOSIS — M77.8 SHOULDER TENDONITIS, RIGHT: Primary | ICD-10-CM

## 2021-07-16 DIAGNOSIS — M25.511 RIGHT SHOULDER PAIN, UNSPECIFIED CHRONICITY: ICD-10-CM

## 2021-07-16 DIAGNOSIS — M75.41 IMPINGEMENT SYNDROME OF RIGHT SHOULDER: ICD-10-CM

## 2021-07-16 DIAGNOSIS — M25.611 DECREASED RANGE OF MOTION OF RIGHT SHOULDER: ICD-10-CM

## 2021-07-16 PROCEDURE — 97110 THERAPEUTIC EXERCISES: CPT | Performed by: OCCUPATIONAL THERAPIST

## 2021-07-16 NOTE — PROGRESS NOTES
" Occupational Therapy Daily Progress Note/Discharge         Patient: Paola Sow   : 1970  Diagnosis/ICD-10 Code:  Shoulder tendonitis, right [M77.8]  Referring practitioner: Khoa Lindsey MD  Date of Initial Visit: Type: THERAPY  Noted: 2021  Today's Date: 2021  Patient seen for 9 sessions             Subjective   Paola Sow reports: \"My shoulder is good\"    Objective          Static Posture     Shoulders  Rounded.    Active Range of Motion   Left Shoulder   Abduction: 140 degrees     Right Shoulder   Abduction: 140 degrees     Additional Active Range of Motion Details  IR equal between R and L behind back    Strength/Myotome Testing     Right Shoulder     Planes of Motion   Flexion: 5   Extension: 5   Abduction: 5   Adduction: 5   External rotation at 0°: 5   External rotation at 45°: 5   External rotation at 90°: 5   External rotation BTH: 5   Internal rotation at 0°: 5   Internal rotation at 45°: 5   Internal rotation at 90°: 5   Internal rotation BTB: 5   Horizontal abduction: 5   Horizontal adduction: 5     Tests     Right Shoulder   Positive Hawkin's.         See Exercise, Manual, and Modality Logs for complete treatment.       Assessment & Plan     Assessment  Assessment details: Pt has progressed well with ROM/strength and will continue with HEP.  Prognosis: good  Functional Limitations: carrying objects, lifting, sleeping, pulling, pushing, reaching behind back and reaching overhead  Goals  Plan Goals: 1. The patient complains of pain in the right shoulder.    LTG 1: 6 weeks:  The patient will report a pain rating of 1/10 or better when reaching behind back in order to improve sleep quality and tolerance to performance of activities of daily living.    STATUS:  Met   STG 1a: 4 weeks:  The patient will report a pain rating of 3/10 or better.     STATUS:  Met   TREATMENT: Manual therapy, therapeutic exercise, neuromuscular re-education, home exercise instruction, and modalities as " needed to include: electrical stimulation, ultrasound, moist heat, fluidotherapy, paraffin, and ice.    2. The patient has limited ROM of the right shoulder.    LTG 2: 6 weeks:  The patient will demonstrate 1 inch difference in IR behind back to allow the patient to wash back.    STATUS:  New    STG 2a: 4 weeks:  The patient will demonstrate 2 inch difference in IR.    STATUS:  Met   TREATMENT: Manual therapy, therapeutic exercise, home exercise instruction, and modalities as needed to include: electrical stimulation, ultrasound, moist heat, fluidotherapy and ice.     3. The patient has limited strength of the right shoulder.   LTG 3: 6 weeks:  The patient will demonstrate 5/5 shoulder strength in order to complete job tasks.    STATUS:  Met   STG 3a: 4 weeks:  The patient will demonstrate 4+/5 horizontal adduction.    STATUS:  Met   TREATMENT: Manual therapy, therapeutic exercise, home exercise instruction, and modalities as needed to include: electrical stimulation, ultrasound, moist heat, fluidotherapy and ice.    4. Carrying, Moving, and Handling Objects Functional Limitation     LTG 4: 6 weeks:  The patient will demonstrate 1-19% limitation by achieving a score of 19 on the Quick DASH.    STATUS:  Not met: pt reports having more difficulty due to hand/thumb than to shoulder.   STG 4a: 4 weeks:  The patient will demonstrate 20-39% limitation by achieving a score of 27 on the Quick DASH.      STATUS:  Met   TREATMENT:  Manual therapy, therapeutic exercise, home exercise instruction, and modalities as needed to include: electrical stimulation, ultrasound, moist heat, fluidotherapy and ice.      Plan  Therapy options: will be seen for skilled physical therapy services  Planned modality interventions: cryotherapy and TENS  Planned therapy interventions: flexibility, functional ROM exercises, home exercise program, manual therapy, postural training, strengthening and stretching  Frequency: 3x week  Duration in weeks:  12  Treatment plan discussed with: patient      Discharge to [facility] at this time.  Other   Discharge           Timed:  Manual Therapy:    0     mins  04683;  Therapeutic Exercise:    23     mins  48043;     Neuromuscular Amanda:    0    mins  49639;    Therapeutic Activity:     0     mins  78407;     Ultrasound:     0     mins  97334;    Electrical Stimulation:    0     mins  82882;    Untimed:  Electrical Stimulation:    0     mins  41118 ( );  Fluidotherapy     0     mins  87322  Hot/cold pack     0     mins  83270    Timed Treatment:   23   mins   Total Treatment:     23   mins        Davion Barger OT  Occupational Therapist

## 2021-08-27 ENCOUNTER — OFFICE VISIT (OUTPATIENT)
Dept: ORTHOPEDIC SURGERY | Facility: CLINIC | Age: 51
End: 2021-08-27

## 2021-08-27 VITALS — OXYGEN SATURATION: 99 % | WEIGHT: 293 LBS | HEIGHT: 67 IN | BODY MASS INDEX: 45.99 KG/M2 | HEART RATE: 88 BPM

## 2021-08-27 DIAGNOSIS — G56.01 RIGHT CARPAL TUNNEL SYNDROME: ICD-10-CM

## 2021-08-27 DIAGNOSIS — M75.41 IMPINGEMENT SYNDROME OF RIGHT SHOULDER: ICD-10-CM

## 2021-08-27 DIAGNOSIS — M77.8 RIGHT SHOULDER TENDINITIS: Primary | ICD-10-CM

## 2021-08-27 DIAGNOSIS — M18.11 ARTHRITIS OF CARPOMETACARPAL (CMC) JOINT OF RIGHT THUMB: ICD-10-CM

## 2021-08-27 PROCEDURE — 99213 OFFICE O/P EST LOW 20 MIN: CPT | Performed by: PHYSICIAN ASSISTANT

## 2021-08-27 NOTE — PROGRESS NOTES
"Chief Complaint  Follow-up of the Right Wrist and Follow-up of the Right Shoulder    Subjective          Paola Sow is a 50 y.o. female  presents to [facility] for   History of Present Illness    Patient presents for follow-up evaluation right shoulder tendinitis, impingement and bursitis, right CMC arthritis and right carpal tunnel syndrome.  Patient received a thumb injection on 7/15/2021 she states that the injection helped she states that her thumb has improved with range of motion and strength she states that she also wears a brace occasionally.  Patient states her shoulder still doing well she received an injection and did therapy and now doing home exercises in the past and no new complaints of the shoulder.  Patient states she has occasional numbness and tingling, denies new symptoms, denies wanting surgery for any of her issues.  No new complaints today.  No Known Allergies     Social History     Socioeconomic History   • Marital status:      Spouse name: Not on file   • Number of children: Not on file   • Years of education: Not on file   • Highest education level: Not on file   Tobacco Use   • Smoking status: Never Smoker   • Smokeless tobacco: Never Used   Vaping Use   • Vaping Use: Never used   Substance and Sexual Activity   • Alcohol use: Yes     Comment: occasionally drinks   • Drug use: Never        REVIEW OF SYSTEMS    Constitutional: Denies fevers, chills, weight loss  Cardiovascular: Denies chest pain, shortness of breath  Skin: Denies rashes, acute skin changes  Neurologic: Denies headache, loss of consciousness  MSK: Right shoulder pain, right carpal tunnel syndrome, right CMC arthritis      Objective   Vital Signs:   Pulse 88   Ht 170.2 cm (67\")   Wt (!) 155 kg (342 lb)   SpO2 99%   BMI 53.56 kg/m²     Body mass index is 53.56 kg/m².    Physical Exam    Right hand: Nontender to palpation at base of thumb, negative grind test, 5 out of 5  strength, " full thumb and finger flexion extension abduction adduction.  Right shoulder: Nontender to palpation, full range of motion, 5-5 strength, no pain with range of motion  Procedures    Imaging Results (Most Recent)     None           Result Review :   The following data was reviewed by: CELINA Wolfe on 08/27/2021:               Assessment and Plan    Diagnoses and all orders for this visit:    1. Right shoulder tendinitis (Primary)    2. Impingement syndrome of right shoulder    3. Arthritis of carpometacarpal (CMC) joint of right thumb    4. Right carpal tunnel syndrome        Discussed diagnosis and treatment options with the patient, patient was advised to continue activity as tolerated she states she would like to follow-up as needed.  May consider future injections as needed for the shoulder for the thumb, we discussed possible carpal tunnel release in the future.    Call or return if worsening symptoms.    Follow Up   Return if symptoms worsen or fail to improve.  Patient was given instructions and counseling regarding her condition or for health maintenance advice. Please see specific information pulled into the AVS if appropriate.

## 2021-09-20 RX ORDER — METOPROLOL SUCCINATE 25 MG/1
TABLET, EXTENDED RELEASE ORAL
Qty: 90 TABLET | Refills: 0 | Status: SHIPPED | OUTPATIENT
Start: 2021-09-20 | End: 2021-09-20

## 2021-09-20 RX ORDER — LISINOPRIL 20 MG/1
TABLET ORAL
Qty: 90 TABLET | Refills: 0 | Status: SHIPPED | OUTPATIENT
Start: 2021-09-20 | End: 2021-12-16

## 2021-09-20 RX ORDER — METOPROLOL SUCCINATE 25 MG/1
25 TABLET, EXTENDED RELEASE ORAL DAILY
Qty: 90 TABLET | Refills: 0 | Status: SHIPPED | OUTPATIENT
Start: 2021-09-20 | End: 2022-03-16

## 2021-09-20 RX ORDER — HYDROXYCHLOROQUINE SULFATE 200 MG/1
200 TABLET, FILM COATED ORAL DAILY
Qty: 90 TABLET | Refills: 1 | Status: SHIPPED | OUTPATIENT
Start: 2021-09-20 | End: 2021-12-19

## 2021-09-20 RX ORDER — HYDROXYCHLOROQUINE SULFATE 200 MG/1
TABLET, FILM COATED ORAL
Qty: 180 TABLET | Refills: 0 | Status: CANCELLED | OUTPATIENT
Start: 2021-09-20 | End: 2021-12-19

## 2021-10-19 RX ORDER — MELOXICAM 15 MG/1
TABLET ORAL
Qty: 90 TABLET | Refills: 0 | Status: SHIPPED | OUTPATIENT
Start: 2021-10-19 | End: 2022-01-17

## 2021-12-16 RX ORDER — LISINOPRIL 20 MG/1
TABLET ORAL
Qty: 90 TABLET | Refills: 0 | Status: SHIPPED | OUTPATIENT
Start: 2021-12-16 | End: 2022-03-16

## 2021-12-17 RX ORDER — TRIAMTERENE AND HYDROCHLOROTHIAZIDE 37.5; 25 MG/1; MG/1
1 TABLET ORAL DAILY
Qty: 90 TABLET | Refills: 1 | OUTPATIENT
Start: 2021-12-17

## 2022-01-17 ENCOUNTER — OFFICE VISIT (OUTPATIENT)
Dept: PODIATRY | Facility: CLINIC | Age: 52
End: 2022-01-17

## 2022-01-17 VITALS
HEART RATE: 89 BPM | BODY MASS INDEX: 45.99 KG/M2 | SYSTOLIC BLOOD PRESSURE: 144 MMHG | OXYGEN SATURATION: 99 % | TEMPERATURE: 97.5 F | DIASTOLIC BLOOD PRESSURE: 82 MMHG | WEIGHT: 293 LBS | HEIGHT: 67 IN

## 2022-01-17 DIAGNOSIS — M79.671 FOOT PAIN, BILATERAL: Primary | ICD-10-CM

## 2022-01-17 DIAGNOSIS — M21.41 PES PLANUS OF BOTH FEET: ICD-10-CM

## 2022-01-17 DIAGNOSIS — M21.42 PES PLANUS OF BOTH FEET: ICD-10-CM

## 2022-01-17 DIAGNOSIS — M79.672 FOOT PAIN, BILATERAL: Primary | ICD-10-CM

## 2022-01-17 PROCEDURE — 99203 OFFICE O/P NEW LOW 30 MIN: CPT | Performed by: PODIATRIST

## 2022-01-17 RX ORDER — FAMOTIDINE 20 MG/1
20 TABLET, FILM COATED ORAL DAILY
COMMUNITY

## 2022-01-17 NOTE — PROGRESS NOTES
[facility]    Today's Date: 01/17/22    Patient Name: Paola Sow  MRN: [id number]  CSN: [id number]  PCP: Laurence Jacob APRN, Last PCP Visit: 14 January 2022  Referring Provider: Referring, Self    SUBJECTIVE     Chief Complaint   Patient presents with   • Left Foot - Establish Care     Patient wanting new orthotic prescription   • Right Foot - Establish Care     Patient wanting new orthotic prescription     HPI: Paola Sow, a 51 y.o.female, presents to clinic.    New, Established, New Problem: New    Location: Bilateral feet    Duration: Lifetime    Onset: Congenital    Nature: Painful flatfeet    Stable, worsening, improving: Worsening    Aggravating factors:  Patient relates pain is aggravated by shoe gear and ambulation.      Previous Treatment: Previous custom-made orthotics which she states her current pair are at least 10 years old and worn out.  She states these need to be replaced.    Patient denies any fevers, chills, nausea, vomiting, shortness of breath, nor any other constitutional signs nor symptoms.    No other pedal complaints at this time.    Recent medical changes: None    Past Medical History:   Diagnosis Date   • Acid reflux disease    • Anxiety    • Arthritis    • Asthma    • Callus    • Fallen arches    • Heart disease    • Heart disease    • Hemorrhoids    • High blood pressure    • Hypertension    • Ingrown toenail    • Plantar warts    • Rectal bleeding      History reviewed. No pertinent surgical history.  Family History   Problem Relation Age of Onset   • Heart disease Mother    • Osteoporosis Mother    • Arthritis Mother    • Heart disease Father    • Cancer Father    • Diabetes Neg Hx    • Stroke Neg Hx      Social History     Socioeconomic History   • Marital status:    Tobacco Use   • Smoking status: Never Smoker   • Smokeless tobacco: Never Used   Vaping Use   • Vaping Use: Never used   Substance and Sexual Activity   • Alcohol use: Yes      Comment: occasionally drinks   • Drug use: Never   • Sexual activity: Defer     No Known Allergies  Current Outpatient Medications   Medication Sig Dispense Refill   • famotidine (PEPCID) 20 MG tablet Take 20 mg by mouth. daily     • lisinopril (PRINIVIL,ZESTRIL) 20 MG tablet TAKE 1 TABLET(20 MG) BY MOUTH EVERY DAY 90 tablet 0   • meloxicam (MOBIC) 15 MG tablet TAKE 1 TABLET(15 MG) BY MOUTH EVERY DAY 90 tablet 0   • metoprolol succinate XL (TOPROL-XL) 25 MG 24 hr tablet Take 1 tablet by mouth Daily. 90 tablet 0   • triamterene-hydrochlorothiazide (MAXZIDE-25) 37.5-25 MG per tablet Take 1 tablet by mouth Daily. 90 tablet 1   • vitamin D (ERGOCALCIFEROL) 1.25 MG (89107 UT) capsule capsule Take 50,000 Units by mouth Every 7 (Seven) Days.       No current facility-administered medications for this visit.     Review of Systems   Musculoskeletal:        Flatfeet   All other systems reviewed and are negative.      OBJECTIVE     Vitals:    01/17/22 0819   BP: 144/82   Pulse: 89   Temp: 97.5 °F (36.4 °C)   SpO2: 99%       WBC   Date Value Ref Range Status   04/30/2021 11.34 (H) 4.80 - 10.80 10*3/uL Final     RBC   Date Value Ref Range Status   04/30/2021 4.81 4.20 - 5.40 10*6/uL Final     Hemoglobin   Date Value Ref Range Status   04/30/2021 14.9 12.0 - 16.0 g/dL Final     Hematocrit   Date Value Ref Range Status   04/30/2021 45.0 37.0 - 47.0 % Final     MCV   Date Value Ref Range Status   04/30/2021 93.6 81.0 - 99.0 fL Final     MCH   Date Value Ref Range Status   04/30/2021 31.0 27.0 - 31.0 pg Final     MCHC   Date Value Ref Range Status   04/30/2021 33.1 33.0 - 37.0 Final     RDW   Date Value Ref Range Status   04/30/2021 12.3 11.7 - 14.4 % Final     RDW-SD   Date Value Ref Range Status   04/30/2021 42.4 36.4 - 46.3 fL Final     MPV   Date Value Ref Range Status   04/30/2021 9.2 (L) 9.4 - 12.3 fL Final     Platelets   Date Value Ref Range Status   04/30/2021 303 130 - 400 10*3/uL Final     Neutrophil Rel %   Date Value  Ref Range Status   04/30/2021 61.1 30.0 - 85.0 % Final     Lymphocyte Rel %   Date Value Ref Range Status   04/30/2021 24.0 20.0 - 45.0 % Final     Monocyte Rel %   Date Value Ref Range Status   04/30/2021 11.2 (H) 3.0 - 10.0 % Final     Eosinophil Rel %   Date Value Ref Range Status   04/30/2021 2.7 0.0 - 7.0 % Final     Basophil Rel %   Date Value Ref Range Status   04/30/2021 0.6 0.0 - 3.0 % Final     Neutrophils Absolute   Date Value Ref Range Status   04/30/2021 6.92 2.00 - 8.00 10*3/uL Final     Lymphocytes Absolute   Date Value Ref Range Status   04/30/2021 2.72 1.00 - 5.00 10*3/uL Final     Monocytes Absolute   Date Value Ref Range Status   04/30/2021 1.27 (H) 0.20 - 1.20 10*3/uL Final     Eosinophils Absolute   Date Value Ref Range Status   04/30/2021 0.31 0.00 - 0.70 10*3/uL Final     Basophils Absolute   Date Value Ref Range Status   04/30/2021 0.07 0.00 - 0.20 10*3/uL Final     NRBC   Date Value Ref Range Status   04/30/2021 0.00 0.00 - 0.70 % Final         Lab Results   Component Value Date    GLUCOSE 88 04/30/2021    BUN 10 04/30/2021    CREATININE 0.82 04/30/2021    BCR 12 04/30/2021    K 4.0 04/30/2021    CO2 27 04/30/2021    CALCIUM 9.3 04/30/2021    ALBUMIN 4.1 04/30/2021    LABIL2 1.3 (L) 04/30/2021    AST 18 04/30/2021    ALT 22 04/30/2021       Patient seen in no apparent distress.      PHYSICAL EXAM:     Foot/Ankle Exam:       General:   Appearance comment:  Morbidly obese  Orientation: AAOx3    Affect: appropriate    Gait: unimpaired    Shoe Gear:  Boots    VASCULAR      Right Foot Vascularity   Normal vascular exam    Dorsalis pedis:  2+  Posterior tibial:  2+  Skin Temperature: warm    Edema Grading:  None  CFT:  < 3 seconds  Pedal Hair Growth:  Present  Varicosities: none       Left Foot Vascularity   Normal vascular exam    Dorsalis pedis:  2+  Posterior tibial:  2+  Skin Temperature: warm    Edema Grading:  None  CFT:  < 3 seconds  Pedal Hair Growth:  Present  Varicosities: none         NEUROLOGIC     Right Foot Neurologic   Normal sensation    Light touch sensation:  Normal  Vibratory sensation:  Normal  Hot/Cold sensation: normal    Protective Sensation using Melrose-Kostas Monofilament:  10     Left Foot Neurologic   Normal sensation    Light touch sensation:  Normal  Vibratory sensation:  Normal  Hot/cold sensation: normal    Protective Sensation using Melrose-Kostas Monofilament:  10     MUSCULOSKELETAL      Right Foot Musculoskeletal   Arch:  Pes planus     Left Foot Musculoskeletal   Arch:  Pes planus     MUSCLE STRENGTH     Right Foot Muscle Strength   Normal strength    Foot dorsiflexion:  5  Foot plantar flexion:  5  Foot inversion:  5  Foot eversion:  5     Left Foot Muscle Strength   Foot dorsiflexion:  5  Foot plantar flexion:  5  Foot inversion:  5  Foot eversion:  5     RANGE OF MOTION      Right Foot Range of Motion   Foot and ankle ROM within normal limits       Left Foot Range of Motion   Foot and ankle ROM within normal limits       DERMATOLOGIC     Right Foot Dermatologic   Skin: skin intact    Nails: normal       Left Foot Dermatologic   Skin: skin intact    Nails: normal          ASSESSMENT/PLAN     Diagnoses and all orders for this visit:    1. Foot pain, bilateral (Primary)    2. Pes planus of both feet  -     Ambulatory Referral For Orthotics    Rx: Custom made orthotics    Comprehensive lower extremity examination and evaluation was performed.    Discussed findings and treatment plan including risks, benefits, and treatment options with patient in detail. Patient agreed with treatment plan.    Medications and allergies reviewed.  Reviewed available lab values along with other pertinent labs.  These were discussed with the patient.    An After Visit Summary was printed and given to the patient at discharge, including (if requested) any available informative/educational handouts regarding diagnosis, treatment, or medications. All questions were answered to patient/family  satisfaction. Should symptoms fail to improve or worsen they agree to call or return to clinic or to go to the Emergency Department. Discussed the importance of following up with any needed screening tests/labs/specialist appointments and any requested follow-up recommended by me today. Importance of maintaining follow-up discussed and patient accepts that missed appointments can delay diagnosis and potentially lead to worsening of conditions.    Return if symptoms worsen or fail to improve., or sooner if acute issues arise.    This document has been electronically signed by Brad Bernard DPM on January 17, 2022 08:44 EST

## 2022-01-19 RX ORDER — MELOXICAM 15 MG/1
TABLET ORAL
Qty: 90 TABLET | Refills: 0 | Status: SHIPPED | OUTPATIENT
Start: 2022-01-19 | End: 2022-04-15

## 2022-01-20 ENCOUNTER — PATIENT ROUNDING (BHMG ONLY) (OUTPATIENT)
Dept: PODIATRY | Facility: CLINIC | Age: 52
End: 2022-01-20

## 2022-01-20 NOTE — PROGRESS NOTES
January 20, 2022    Hello, may I speak with Paola Sow?    My name is Margie      I am  with [facility]  [address]  [place] [address] [phone].    Before we get started may I verify your date of birth? 1970    I am calling to officially welcome you to our practice and ask about your recent visit. Is this a good time to talk? yes    Tell me about your visit with us. What things went well?  Very quick visit.  Staff friendly        We're always looking for ways to make our patients' experiences even better. Do you have recommendations on ways we may improve?  no    Overall were you satisfied with your first visit to our practice? yes       I appreciate you taking the time to speak with me today. Is there anything else I can do for you? no      Thank you, and have a great day.

## 2022-02-17 ENCOUNTER — TELEPHONE (OUTPATIENT)
Dept: FAMILY MEDICINE CLINIC | Facility: CLINIC | Age: 52
End: 2022-02-17

## 2022-02-17 NOTE — TELEPHONE ENCOUNTER
Caller: Paola Sow    Relationship: Self    Best call back number: [phone]    Who are you requesting to speak with (clinical staff, provider,  specific staff member): MEDICAL STAFF    What was the call regarding: PAT[facility]. SHE WAS GIVEN EAR DROPS AND ANTIBIOTICS. SHE DOES NOT FEEL LIKE THEY ARE HELPING. SHE NOW HAS A LITTLE BIT OF BLOOD COMING FROM HER RIGHT EAR. THERE ARE NO APPTS AVAILABLE UNTIL NEXT WEEK. PLEASE CALL PATIENT TO ADVISE.

## 2022-03-16 RX ORDER — LISINOPRIL 20 MG/1
TABLET ORAL
Qty: 90 TABLET | Refills: 0 | Status: SHIPPED | OUTPATIENT
Start: 2022-03-16 | End: 2022-06-13

## 2022-03-16 RX ORDER — METOPROLOL SUCCINATE 25 MG/1
25 TABLET, EXTENDED RELEASE ORAL DAILY
Qty: 90 TABLET | Refills: 0 | Status: SHIPPED | OUTPATIENT
Start: 2022-03-16 | End: 2022-06-13

## 2022-03-16 RX ORDER — HYDROXYCHLOROQUINE SULFATE 200 MG/1
200 TABLET, FILM COATED ORAL DAILY
Qty: 90 TABLET | Refills: 1 | Status: SHIPPED | OUTPATIENT
Start: 2022-03-16 | End: 2022-06-14

## 2022-03-16 RX ORDER — HYDROXYCHLOROQUINE SULFATE 200 MG/1
200 TABLET, FILM COATED ORAL DAILY
Qty: 90 TABLET | Refills: 0 | Status: CANCELLED | OUTPATIENT
Start: 2022-03-16 | End: 2022-06-14

## 2022-03-18 ENCOUNTER — OFFICE VISIT (OUTPATIENT)
Dept: FAMILY MEDICINE CLINIC | Facility: CLINIC | Age: 52
End: 2022-03-18

## 2022-03-18 VITALS
OXYGEN SATURATION: 99 % | HEIGHT: 67 IN | DIASTOLIC BLOOD PRESSURE: 82 MMHG | HEART RATE: 73 BPM | WEIGHT: 293 LBS | SYSTOLIC BLOOD PRESSURE: 138 MMHG | BODY MASS INDEX: 45.99 KG/M2 | TEMPERATURE: 98.2 F

## 2022-03-18 DIAGNOSIS — Z12.31 SCREENING MAMMOGRAM, ENCOUNTER FOR: ICD-10-CM

## 2022-03-18 DIAGNOSIS — Z01.419 WELL WOMAN EXAM: Primary | ICD-10-CM

## 2022-03-18 PROCEDURE — 87660 TRICHOMONAS VAGIN DIR PROBE: CPT | Performed by: NURSE PRACTITIONER

## 2022-03-18 PROCEDURE — G0123 SCREEN CERV/VAG THIN LAYER: HCPCS | Performed by: NURSE PRACTITIONER

## 2022-03-18 PROCEDURE — 87624 HPV HI-RISK TYP POOLED RSLT: CPT | Performed by: NURSE PRACTITIONER

## 2022-03-18 PROCEDURE — 99396 PREV VISIT EST AGE 40-64: CPT | Performed by: NURSE PRACTITIONER

## 2022-03-18 PROCEDURE — 87480 CANDIDA DNA DIR PROBE: CPT | Performed by: NURSE PRACTITIONER

## 2022-03-18 PROCEDURE — 87510 GARDNER VAG DNA DIR PROBE: CPT | Performed by: NURSE PRACTITIONER

## 2022-03-18 RX ORDER — CHOLECALCIFEROL (VITAMIN D3) 50 MCG
2000 TABLET ORAL DAILY
COMMUNITY
End: 2022-08-30

## 2022-03-18 NOTE — PROGRESS NOTES
"Chief Complaint  Gynecologic Exam and Annual Exam    Subjective          Medical History: has a past medical history of Acid reflux disease, Anxiety, Arthritis, Asthma, Callus, Fallen arches, Heart disease, Heart disease, Hemorrhoids, High blood pressure, Hypertension, Ingrown toenail, Plantar warts, and Rectal bleeding.     Surgical History: has a past surgical history that includes Dental surgery; Foot surgery (Bilateral); and Colonoscopy.     Family History: family history includes Arthritis in her mother; Cancer in her father; Heart disease in her father and mother; Osteoporosis in her mother.     Social History: reports that she has never smoked. She has never used smokeless tobacco. She reports current alcohol use of about 1.0 standard drink of alcohol per week. She reports that she does not use drugs.    Paola Sow presents to [facility]  Gynecologic Exam  The patient's pertinent negatives include no genital odor, genital rash or vaginal discharge. The patient is experiencing no pain. Pertinent negatives include no abdominal pain, dysuria, frequency or urgency. No, her partner does not have an STD. She uses nothing for contraception. She is postmenopausal.       Objective   Vital Signs:   /82   Pulse 73   Temp 98.2 °F (36.8 °C)   Ht 170.2 cm (67\")   Wt (!) 154 kg (340 lb)   SpO2 99%   BMI 53.25 kg/m²     Physical Exam  Vitals reviewed. Exam conducted with a chaperone present (Hayley Snow MA).   Constitutional:       Appearance: Normal appearance. She is well-developed.   HENT:      Head: Normocephalic and atraumatic.   Eyes:      Conjunctiva/sclera: Conjunctivae normal.      Pupils: Pupils are equal, round, and reactive to light.   Cardiovascular:      Rate and Rhythm: Normal rate and regular rhythm.      Heart sounds: No murmur heard.  Pulmonary:      Effort: Pulmonary effort is normal.      Breath sounds: Normal breath sounds. No wheezing or rhonchi. "   Chest:   Breasts:      Right: Normal.      Left: Normal.       Genitourinary:     Exam position: Knee-chest position.      Vagina: Normal.      Cervix: Normal.      Uterus: Normal.    Skin:     General: Skin is warm and dry.   Neurological:      Mental Status: She is alert and oriented to person, place, and time.   Psychiatric:         Mood and Affect: Mood and affect normal.         Behavior: Behavior normal.         Thought Content: Thought content normal.         Judgment: Judgment normal.        Result Review :   The following data was reviewed by: DEWEY Stuart on 03/18/2022:  Common labs    Common Labsle 4/30/21 4/30/21    1615 1615   Glucose  88   BUN  10   Creatinine  0.82   Sodium  138   Potassium  4.0   Chloride  99   Calcium  9.3   Albumin  4.1   Total Bilirubin  0.21   Alkaline Phosphatase  77   AST (SGOT)  18   ALT (SGPT)  22   WBC  11.34 (A)   Hemoglobin  14.9   Hematocrit  45.0   Platelets  303   Total Cholesterol  165   Triglycerides  226 (A)   HDL Cholesterol  52   LDL Cholesterol   68 (A)   Hemoglobin A1C 5.4    (A) Abnormal value       Comments are available for some flowsheets but are not being displayed.                       Current Outpatient Medications on File Prior to Visit   Medication Sig Dispense Refill   • Cholecalciferol (Vitamin D) 50 MCG (2000 UT) tablet Take 2,000 Units by mouth Daily.     • famotidine (PEPCID) 20 MG tablet Take 20 mg by mouth. daily     • hydroxychloroquine (PLAQUENIL) 200 MG tablet Take 1 tablet by mouth Daily for 90 days. Indications: Systemic Lupus Erythematosus 90 tablet 1   • lisinopril (PRINIVIL,ZESTRIL) 20 MG tablet TAKE 1 TABLET(20 MG) BY MOUTH EVERY DAY 90 tablet 0   • meloxicam (MOBIC) 15 MG tablet TAKE 1 TABLET(15 MG) BY MOUTH EVERY DAY 90 tablet 0   • metoprolol succinate XL (TOPROL-XL) 25 MG 24 hr tablet TAKE 1 TABLET BY MOUTH DAILY 90 tablet 0   • triamterene-hydrochlorothiazide (MAXZIDE-25) 37.5-25 MG per tablet Take 1 tablet by mouth  Daily. 90 tablet 1   • [DISCONTINUED] vitamin D (ERGOCALCIFEROL) 1.25 MG (57977 UT) capsule capsule Take 2,000 Units by mouth Daily.       No current facility-administered medications on file prior to visit.        Assessment and Plan    Diagnoses and all orders for this visit:    1. Well woman exam (Primary)  -     IgP, Aptima HPV  -     Gardnerella vaginalis, Trichomonas vaginalis, Candida albicans, DNA - Swab, Cervix    2. Screening mammogram, encounter for  -     Mammo Screening Digital Tomosynthesis Bilateral With CAD; Future        Follow Up   No follow-ups on file.  Patient was given instructions and counseling regarding her condition or for health maintenance advice. Please see specific information pulled into the AVS if appropriate.

## 2022-03-19 LAB
CANDIDA SPECIES: NEGATIVE
GARDNERELLA VAGINALIS: NEGATIVE
T VAGINALIS DNA VAG QL PROBE+SIG AMP: NEGATIVE

## 2022-03-25 LAB
CYTOLOGIST CVX/VAG CYTO: NORMAL
CYTOLOGY CVX/VAG DOC CYTO: NORMAL
CYTOLOGY CVX/VAG DOC THIN PREP: NORMAL
DX ICD CODE: NORMAL
HIV 1 & 2 AB SER-IMP: NORMAL
HPV I/H RISK 4 DNA CVX QL PROBE+SIG AMP: NEGATIVE
OTHER STN SPEC: NORMAL
STAT OF ADQ CVX/VAG CYTO-IMP: NORMAL

## 2022-04-15 RX ORDER — MELOXICAM 15 MG/1
TABLET ORAL
Qty: 90 TABLET | Refills: 0 | Status: SHIPPED | OUTPATIENT
Start: 2022-04-15 | End: 2022-07-14

## 2022-04-22 ENCOUNTER — APPOINTMENT (OUTPATIENT)
Dept: MAMMOGRAPHY | Facility: HOSPITAL | Age: 52
End: 2022-04-22

## 2022-04-22 ENCOUNTER — HOSPITAL ENCOUNTER (OUTPATIENT)
Dept: MAMMOGRAPHY | Facility: HOSPITAL | Age: 52
Discharge: HOME | End: 2022-04-22
Admitting: NURSE PRACTITIONER

## 2022-04-22 DIAGNOSIS — Z12.31 SCREENING MAMMOGRAM, ENCOUNTER FOR: ICD-10-CM

## 2022-04-22 PROCEDURE — 77067 SCR MAMMO BI INCL CAD: CPT

## 2022-04-22 PROCEDURE — 77063 BREAST TOMOSYNTHESIS BI: CPT

## 2022-05-24 ENCOUNTER — OFFICE VISIT (OUTPATIENT)
Dept: PULMONOLOGY | Facility: CLINIC | Age: 52
End: 2022-05-24

## 2022-05-24 VITALS
HEIGHT: 67 IN | TEMPERATURE: 98 F | BODY MASS INDEX: 45.99 KG/M2 | HEART RATE: 85 BPM | OXYGEN SATURATION: 99 % | SYSTOLIC BLOOD PRESSURE: 147 MMHG | DIASTOLIC BLOOD PRESSURE: 94 MMHG | RESPIRATION RATE: 16 BRPM | WEIGHT: 293 LBS

## 2022-05-24 DIAGNOSIS — I10 HYPERTENSION, UNSPECIFIED TYPE: ICD-10-CM

## 2022-05-24 DIAGNOSIS — Z99.89 OSA ON CPAP: Primary | ICD-10-CM

## 2022-05-24 DIAGNOSIS — Z23 ENCOUNTER FOR IMMUNIZATION: ICD-10-CM

## 2022-05-24 DIAGNOSIS — G47.33 OSA ON CPAP: Primary | ICD-10-CM

## 2022-05-24 DIAGNOSIS — E66.01 MORBID OBESITY WITH BMI OF 50.0-59.9, ADULT: ICD-10-CM

## 2022-05-24 DIAGNOSIS — R06.09 DYSPNEA ON EXERTION: ICD-10-CM

## 2022-05-24 PROCEDURE — 90471 IMMUNIZATION ADMIN: CPT | Performed by: NURSE PRACTITIONER

## 2022-05-24 PROCEDURE — 99214 OFFICE O/P EST MOD 30 MIN: CPT | Performed by: NURSE PRACTITIONER

## 2022-05-24 PROCEDURE — 90677 PCV20 VACCINE IM: CPT | Performed by: NURSE PRACTITIONER

## 2022-05-24 NOTE — PROGRESS NOTES
Primary Care Provider  Laurence Jacob APRN     Referring Provider  No ref. provider found     Chief Complaint  Sleep Apnea ( One Year f/u)    Subjective          Paola Sow presents to [facility]  History of Present Illness  Paola Sow is a 51 y.o. female patient previously seen by Dr. Lubin.  She is here for management of sleep apnea, hypertension, and obesity.     Patient states she is doing well since her last visit.  She denies using any antibiotics or steroids for her lungs.  She denies any fevers, chills, or cough.  She does have some mild shortness of breath with exertion, but states that she believes it is related to her weight.  Her most recent compliance report shows a usage of 89/90 days, average use is 7 hours and 7 minutes, and an AHI of 1.  Patient's average CPAP pressure is 12.8.  She is on a auto CPAP 12-16.  Patient's hypertension is being managed by her primary care provider.  Patient has no concerns at this time.  She would like to receive a pneumonia vaccine today.  She is up-to-date with her flu and COVID-vaccine.  She is able to perform her ADLs without difficulty.     Her history of smoking is   Tobacco Use: Low Risk    • Smoking Tobacco Use: Never Smoker   • Smokeless Tobacco Use: Never Used   .    Review of Systems   Constitutional: Negative for chills, fatigue, fever, unexpected weight gain and unexpected weight loss.   HENT: Negative for congestion (Nasal), hearing loss, mouth sores, nosebleeds, postnasal drip, sore throat and trouble swallowing.    Eyes: Negative for blurred vision and visual disturbance.   Respiratory: Negative for apnea, cough, shortness of breath and wheezing.         Negative for Hemoptysis     Cardiovascular: Negative for chest pain, palpitations and leg swelling.   Gastrointestinal: Negative for abdominal pain, constipation, diarrhea, nausea, vomiting and GERD.        Negative for  Jaundice  Negative for Bloating  Negative for Melena   Musculoskeletal: Negative for joint swelling and myalgias.        Negative for Joint pain  Negative for Joint stiffness   Skin: Negative for color change.        Negative for cyanosis   Neurological: Negative for syncope, weakness, numbness and headache.      Sleep: Negative for Excessive daytime sleepiness  Negative for morning headaches  Negative for Snoring    Family History   Problem Relation Age of Onset   • Heart disease Mother    • Osteoporosis Mother    • Arthritis Mother    • Heart disease Father    • Cancer Father    • Diabetes Neg Hx    • Stroke Neg Hx         Social History     Socioeconomic History   • Marital status:    Tobacco Use   • Smoking status: Never Smoker   • Smokeless tobacco: Never Used   Vaping Use   • Vaping Use: Never used   Substance and Sexual Activity   • Alcohol use: Yes     Alcohol/week: 1.0 standard drink     Types: 1 Glasses of wine per week     Comment: I only drink sporadically.   • Drug use: Never   • Sexual activity: Yes     Partners: Male     Birth control/protection: None        Past Medical History:   Diagnosis Date   • Acid reflux disease    • Anxiety    • Arthritis    • Asthma    • Callus    • Fallen arches    • Heart disease    • Heart disease    • Hemorrhoids    • High blood pressure    • Hypertension    • Ingrown toenail    • Plantar warts    • Rectal bleeding         Immunization History   Administered Date(s) Administered   • COVID-19 (JAMAAL) 03/11/2021   • COVID-19 (MODERNA) 1st, 2nd, 3rd Dose Only 10/23/2021   • Flu Vaccine Intradermal Quad 18-64YR 10/01/2021   • Influenza, Unspecified 10/01/2019, 10/09/2020   • Pneumococcal Conjugate 20-Valent (PCV20) 05/24/2022         No Known Allergies       Current Outpatient Medications:   •  Cholecalciferol (Vitamin D) 50 MCG (2000 UT) tablet, Take 2,000 Units by mouth Daily., Disp: , Rfl:   •  famotidine (PEPCID) 20 MG tablet, Take 20 mg by mouth. daily, Disp: ,  Rfl:   •  hydroxychloroquine (PLAQUENIL) 200 MG tablet, Take 1 tablet by mouth Daily for 90 days. Indications: Systemic Lupus Erythematosus, Disp: 90 tablet, Rfl: 1  •  lisinopril (PRINIVIL,ZESTRIL) 20 MG tablet, TAKE 1 TABLET(20 MG) BY MOUTH EVERY DAY, Disp: 90 tablet, Rfl: 0  •  meloxicam (MOBIC) 15 MG tablet, TAKE 1 TABLET(15 MG) BY MOUTH EVERY DAY, Disp: 90 tablet, Rfl: 0  •  metoprolol succinate XL (TOPROL-XL) 25 MG 24 hr tablet, TAKE 1 TABLET BY MOUTH DAILY, Disp: 90 tablet, Rfl: 0  •  triamterene-hydrochlorothiazide (MAXZIDE-25) 37.5-25 MG per tablet, Take 1 tablet by mouth Daily., Disp: 90 tablet, Rfl: 1     Objective   Physical Exam  Constitutional:       General: She is not in acute distress.     Appearance: Normal appearance. She is obese.   HENT:      Right Ear: Hearing normal.      Left Ear: Hearing normal.      Nose: No nasal tenderness or congestion.      Mouth/Throat:      Mouth: Mucous membranes are moist. No oral lesions.   Eyes:      Extraocular Movements: Extraocular movements intact.      Pupils: Pupils are equal, round, and reactive to light.   Neck:      Thyroid: No thyroid mass or thyromegaly.   Cardiovascular:      Rate and Rhythm: Normal rate and regular rhythm.      Pulses: Normal pulses.      Heart sounds: Normal heart sounds. No murmur heard.  Pulmonary:      Effort: Pulmonary effort is normal.      Breath sounds: Normal breath sounds. No wheezing, rhonchi or rales.   Chest:   Breasts:      Right: No axillary adenopathy.       Abdominal:      General: Bowel sounds are normal. There is no distension.      Palpations: Abdomen is soft.      Tenderness: There is no abdominal tenderness.   Musculoskeletal:      Cervical back: Neck supple.      Right lower leg: No edema.      Left lower leg: No edema.   Lymphadenopathy:      Cervical: No cervical adenopathy.      Upper Body:      Right upper body: No axillary adenopathy.   Skin:     General: Skin is warm and dry.      Findings: No lesion or  "rash.   Neurological:      General: No focal deficit present.      Mental Status: She is alert and oriented to person, place, and time.      Cranial Nerves: Cranial nerves are intact.   Psychiatric:         Mood and Affect: Affect normal. Mood is not anxious or depressed.         Vital Signs:   /94 (BP Location: Left arm, Patient Position: Sitting, Cuff Size: Adult)   Pulse 85   Temp 98 °F (36.7 °C) (Infrared)   Resp 16   Ht 170.2 cm (67\")   Wt (!) 154 kg (340 lb)   SpO2 99% Comment: room air  BMI 53.25 kg/m²        Result Review :{Labs  Result Review  Imaging  Med Tab  Media  Procedures :23}           Data reviewed: Dr. Lubin last office note   Procedures        Assessment and Plan    Diagnoses and all orders for this visit:    1. MARGOT on CPAP (Primary)    2. Encounter for immunization  -     Pneumococcal Conjugate Vaccine 20-Valent (PCV20)    3. Morbid obesity with BMI of 50.0-59.9, adult (HCC)    4. Dyspnea on exertion    5. Hypertension, unspecified type    6.  Continue CPAP at current settings.  Encouraged patient to clean mask and tubing daily.  7.  Encouraged patient to stay as active as possible.  Recommended 30 minutes of daily exercise.  8.  Follow-up with primary care as scheduled for management of hypertension.  9.  Follow with me in 1 year, sooner if needed.        Follow Up   Return in about 1 year (around 5/24/2023) for Recheck with Kassy for CPAP.  Patient was given instructions and counseling regarding her condition or for health maintenance advice. Please see specific information pulled into the AVS if appropriate.       "

## 2022-05-24 NOTE — PATIENT INSTRUCTIONS
Sleep Apnea  Sleep apnea is a condition in which breathing pauses or becomes shallow during sleep. Episodes of sleep apnea usually last 10 seconds or longer, and they may occur as many as 20 times an hour. Sleep apnea disrupts your sleep and keeps your body from getting the rest that it needs. This condition can increase your risk of certain health problems, including:  Heart attack.  Stroke.  Obesity.  Diabetes.  Heart failure.  Irregular heartbeat.  What are the causes?  There are three kinds of sleep apnea:  Obstructive sleep apnea. This kind is caused by a blocked or collapsed airway.  Central sleep apnea. This kind happens when the part of the brain that controls breathing does not send the correct signals to the muscles that control breathing.  Mixed sleep apnea. This is a combination of obstructive and central sleep apnea.  The most common cause of this condition is a collapsed or blocked airway. An airway can collapse or become blocked if:  Your throat muscles are abnormally relaxed.  Your tongue and tonsils are larger than normal.  You are overweight.  Your airway is smaller than normal.  What increases the risk?  You are more likely to develop this condition if you:  Are overweight.  Smoke.  Have a smaller than normal airway.  Are elderly.  Are male.  Drink alcohol.  Take sedatives or tranquilizers.  Have a family history of sleep apnea.  What are the signs or symptoms?  Symptoms of this condition include:  Trouble staying asleep.  Daytime sleepiness and tiredness.  Irritability.  Loud snoring.  Morning headaches.  Trouble concentrating.  Forgetfulness.  Decreased interest in sex.  Unexplained sleepiness.  Mood swings.  Personality changes.  Feelings of depression.  Waking up often during the night to urinate.  Dry mouth.  Sore throat.  How is this diagnosed?  This condition may be diagnosed with:  A medical history.  A physical exam.  A series of tests that are done while you are sleeping (sleep study).  These tests are usually done in a sleep lab, but they may also be done at home.  How is this treated?  Treatment for this condition aims to restore normal breathing and to ease symptoms during sleep. It may involve managing health issues that can affect breathing, such as high blood pressure or obesity. Treatment may include:  Sleeping on your side.  Using a decongestant if you have nasal congestion.  Avoiding the use of depressants, including alcohol, sedatives, and narcotics.  Losing weight if you are overweight.  Making changes to your diet.  Quitting smoking.  Using a device to open your airway while you sleep, such as:  An oral appliance. This is a custom-made mouthpiece that shifts your lower jaw forward.  A continuous positive airway pressure (CPAP) device. This device blows air through a mask when you breathe out (exhale).  A nasal expiratory positive airway pressure (EPAP) device. This device has valves that you put into each nostril.  A bi-level positive airway pressure (BPAP) device. This device blows air through a mask when you breathe in (inhale) and breathe out (exhale).  Having surgery if other treatments do not work. During surgery, excess tissue is removed to create a wider airway.  It is important to get treatment for sleep apnea. Without treatment, this condition can lead to:  High blood pressure.  Coronary artery disease.  In men, an inability to achieve or maintain an erection (impotence).  Reduced thinking abilities.  Follow these instructions at home:  Lifestyle  Make any lifestyle changes that your health care provider recommends.  Eat a healthy, well-balanced diet.  Take steps to lose weight if you are overweight.  Avoid using depressants, including alcohol, sedatives, and narcotics.  Do not use any products that contain nicotine or tobacco, such as cigarettes, e-cigarettes, and chewing tobacco. If you need help quitting, ask your health care provider.  General instructions  Take  over-the-counter and prescription medicines only as told by your health care provider.  If you were given a device to open your airway while you sleep, use it only as told by your health care provider.  If you are having surgery, make sure to tell your health care provider you have sleep apnea. You may need to bring your device with you.  Keep all follow-up visits as told by your health care provider. This is important.  Contact a health care provider if:  The device that you received to open your airway during sleep is uncomfortable or does not seem to be working.  Your symptoms do not improve.  Your symptoms get worse.  Get help right away if:  You develop:  Chest pain.  Shortness of breath.  Discomfort in your back, arms, or stomach.  You have:  Trouble speaking.  Weakness on one side of your body.  Drooping in your face.  These symptoms may represent a serious problem that is an emergency. Do not wait to see if the symptoms will go away. Get medical help right away. Call your local emergency services (911 in the [place] Do not drive yourself to the hospital.  Summary  Sleep apnea is a condition in which breathing pauses or becomes shallow during sleep.  The most common cause is a collapsed or blocked airway.  The goal of treatment is to restore normal breathing and to ease symptoms during sleep.  This information is not intended to replace advice given to you by your health care provider. Make sure you discuss any questions you have with your health care provider.  Document Revised: 06/03/2020 Document Reviewed: 08/13/2019  [employer] Patient Education © 2021 [employer]

## 2022-06-13 RX ORDER — LISINOPRIL 20 MG/1
TABLET ORAL
Qty: 90 TABLET | Refills: 0 | Status: SHIPPED | OUTPATIENT
Start: 2022-06-13 | End: 2022-09-03 | Stop reason: HOSPADM

## 2022-06-13 RX ORDER — METOPROLOL SUCCINATE 25 MG/1
25 TABLET, EXTENDED RELEASE ORAL DAILY
Qty: 90 TABLET | Refills: 0 | Status: ON HOLD | OUTPATIENT
Start: 2022-06-13 | End: 2022-09-03 | Stop reason: SDUPTHER

## 2022-07-14 RX ORDER — MELOXICAM 15 MG/1
TABLET ORAL
Qty: 90 TABLET | Refills: 0 | Status: ON HOLD | OUTPATIENT
Start: 2022-07-14 | End: 2022-09-03 | Stop reason: SDUPTHER

## 2022-08-05 ENCOUNTER — OFFICE VISIT (OUTPATIENT)
Dept: FAMILY MEDICINE CLINIC | Facility: CLINIC | Age: 52
End: 2022-08-05

## 2022-08-05 VITALS
SYSTOLIC BLOOD PRESSURE: 136 MMHG | WEIGHT: 293 LBS | TEMPERATURE: 97.5 F | HEART RATE: 86 BPM | OXYGEN SATURATION: 99 % | DIASTOLIC BLOOD PRESSURE: 80 MMHG | HEIGHT: 67 IN | BODY MASS INDEX: 45.99 KG/M2

## 2022-08-05 DIAGNOSIS — I10 ESSENTIAL HYPERTENSION: ICD-10-CM

## 2022-08-05 DIAGNOSIS — L68.0 HIRSUTISM: ICD-10-CM

## 2022-08-05 DIAGNOSIS — Z00.00 ANNUAL PHYSICAL EXAM: Primary | ICD-10-CM

## 2022-08-05 DIAGNOSIS — G89.29 CHRONIC EAR PAIN, BILATERAL: ICD-10-CM

## 2022-08-05 DIAGNOSIS — N95.9 PREMENOPAUSAL PATIENT: ICD-10-CM

## 2022-08-05 DIAGNOSIS — E78.1 HYPERTRIGLYCERIDEMIA: ICD-10-CM

## 2022-08-05 DIAGNOSIS — M25.50 POLYARTHRALGIA: ICD-10-CM

## 2022-08-05 DIAGNOSIS — Z11.59 ENCOUNTER FOR HEPATITIS C SCREENING TEST FOR LOW RISK PATIENT: ICD-10-CM

## 2022-08-05 DIAGNOSIS — H92.03 CHRONIC EAR PAIN, BILATERAL: ICD-10-CM

## 2022-08-05 DIAGNOSIS — E55.9 VITAMIN D DEFICIENCY: ICD-10-CM

## 2022-08-05 LAB
25(OH)D3 SERPL-MCNC: >200 NG/ML (ref 30–100)
ALBUMIN SERPL-MCNC: 4.2 G/DL (ref 3.5–5.2)
ALBUMIN/GLOB SERPL: 1.5 G/DL
ALP SERPL-CCNC: 69 U/L (ref 39–117)
ALT SERPL W P-5'-P-CCNC: 24 U/L (ref 1–33)
ANION GAP SERPL CALCULATED.3IONS-SCNC: 10.5 MMOL/L (ref 5–15)
AST SERPL-CCNC: 24 U/L (ref 1–32)
BASOPHILS # BLD AUTO: 0.05 10*3/MM3 (ref 0–0.2)
BASOPHILS NFR BLD AUTO: 0.6 % (ref 0–1.5)
BILIRUB SERPL-MCNC: 0.4 MG/DL (ref 0–1.2)
BUN SERPL-MCNC: 8 MG/DL (ref 6–20)
BUN/CREAT SERPL: 10.1 (ref 7–25)
CALCIUM SPEC-SCNC: 9.5 MG/DL (ref 8.6–10.5)
CHLORIDE SERPL-SCNC: 100 MMOL/L (ref 98–107)
CHOLEST SERPL-MCNC: 186 MG/DL (ref 0–200)
CO2 SERPL-SCNC: 27.5 MMOL/L (ref 22–29)
CREAT SERPL-MCNC: 0.79 MG/DL (ref 0.57–1)
DEPRECATED RDW RBC AUTO: 43 FL (ref 37–54)
EGFRCR SERPLBLD CKD-EPI 2021: 90.7 ML/MIN/1.73
EOSINOPHIL # BLD AUTO: 0.14 10*3/MM3 (ref 0–0.4)
EOSINOPHIL NFR BLD AUTO: 1.8 % (ref 0.3–6.2)
ERYTHROCYTE [DISTWIDTH] IN BLOOD BY AUTOMATED COUNT: 12.8 % (ref 12.3–15.4)
GLOBULIN UR ELPH-MCNC: 2.8 GM/DL
GLUCOSE SERPL-MCNC: 82 MG/DL (ref 65–99)
HCT VFR BLD AUTO: 45.7 % (ref 34–46.6)
HCV AB SER DONR QL: NORMAL
HDLC SERPL-MCNC: 62 MG/DL (ref 40–60)
HGB BLD-MCNC: 15.6 G/DL (ref 12–15.9)
IMM GRANULOCYTES # BLD AUTO: 0.03 10*3/MM3 (ref 0–0.05)
IMM GRANULOCYTES NFR BLD AUTO: 0.4 % (ref 0–0.5)
LDLC SERPL CALC-MCNC: 104 MG/DL (ref 0–100)
LDLC/HDLC SERPL: 1.64 {RATIO}
LYMPHOCYTES # BLD AUTO: 1.71 10*3/MM3 (ref 0.7–3.1)
LYMPHOCYTES NFR BLD AUTO: 21.4 % (ref 19.6–45.3)
MCH RBC QN AUTO: 31.5 PG (ref 26.6–33)
MCHC RBC AUTO-ENTMCNC: 34.1 G/DL (ref 31.5–35.7)
MCV RBC AUTO: 92.3 FL (ref 79–97)
MONOCYTES # BLD AUTO: 0.69 10*3/MM3 (ref 0.1–0.9)
MONOCYTES NFR BLD AUTO: 8.6 % (ref 5–12)
NEUTROPHILS NFR BLD AUTO: 5.37 10*3/MM3 (ref 1.7–7)
NEUTROPHILS NFR BLD AUTO: 67.2 % (ref 42.7–76)
NRBC BLD AUTO-RTO: 0 /100 WBC (ref 0–0.2)
PLATELET # BLD AUTO: 279 10*3/MM3 (ref 140–450)
PMV BLD AUTO: 9 FL (ref 6–12)
POTASSIUM SERPL-SCNC: 4.3 MMOL/L (ref 3.5–5.2)
PROT SERPL-MCNC: 7 G/DL (ref 6–8.5)
RBC # BLD AUTO: 4.95 10*6/MM3 (ref 3.77–5.28)
SODIUM SERPL-SCNC: 138 MMOL/L (ref 136–145)
TRIGL SERPL-MCNC: 111 MG/DL (ref 0–150)
TSH SERPL DL<=0.05 MIU/L-ACNC: 2.6 UIU/ML (ref 0.27–4.2)
VLDLC SERPL-MCNC: 20 MG/DL (ref 5–40)
WBC NRBC COR # BLD: 7.99 10*3/MM3 (ref 3.4–10.8)

## 2022-08-05 PROCEDURE — 86803 HEPATITIS C AB TEST: CPT | Performed by: NURSE PRACTITIONER

## 2022-08-05 PROCEDURE — 99214 OFFICE O/P EST MOD 30 MIN: CPT | Performed by: NURSE PRACTITIONER

## 2022-08-05 PROCEDURE — 80050 GENERAL HEALTH PANEL: CPT | Performed by: NURSE PRACTITIONER

## 2022-08-05 PROCEDURE — 82306 VITAMIN D 25 HYDROXY: CPT | Performed by: NURSE PRACTITIONER

## 2022-08-05 PROCEDURE — 80061 LIPID PANEL: CPT | Performed by: NURSE PRACTITIONER

## 2022-08-05 PROCEDURE — 99396 PREV VISIT EST AGE 40-64: CPT | Performed by: NURSE PRACTITIONER

## 2022-08-05 RX ORDER — FLUOCINOLONE ACETONIDE 0.11 MG/ML
OIL AURICULAR (OTIC) 2 TIMES DAILY
Qty: 20 ML | Refills: 2 | Status: SHIPPED | OUTPATIENT
Start: 2022-08-05 | End: 2022-08-11 | Stop reason: SDUPTHER

## 2022-08-05 NOTE — PROGRESS NOTES
"Chief Complaint  Earache, Annual Exam, Hypertension, and Hirsutism    Subjective          Medical History: has a past medical history of Acid reflux disease, Anxiety, Arthritis, Asthma, Callus, Fallen arches, Heart disease, Heart disease, Hemorrhoids, High blood pressure, Hypertension, Ingrown toenail, Plantar warts, and Rectal bleeding.     Surgical History: has a past surgical history that includes Dental surgery; Foot surgery (Bilateral); and Colonoscopy.     Family History: family history includes Arthritis in her mother; Cancer in her father; Heart disease in her father and mother; Osteoporosis in her mother.     Social History: reports that she has never smoked. She has never used smokeless tobacco. She reports current alcohol use of about 1.0 standard drink of alcohol per week. She reports that she does not use drugs.    Paola Sow presents to [facility]  ENCOUNTER DATE:  08/05/2022    Paola Sow / 51 y.o. / female      CHIEF COMPLAINT    Earache      VITALS    /80   Pulse 86   Temp 97.5 °F (36.4 °C)   Ht 170.2 cm (67\")   Wt (!) 155 kg (341 lb 12.8 oz)   SpO2 99%   Breastfeeding No   BMI 53.53 kg/m²     BP Readings from Last 3 Encounters:  08/05/22 : 136/80  07/18/22 : 138/76  05/24/22 : 147/94    Wt Readings from Last 3 Encounters:  08/05/22 : (!) 155 kg (341 lb 12.8 oz)  07/18/22 : (!) 142 kg (313 lb)  05/24/22 : (!) 154 kg (340 lb)    Body mass index is 53.53 kg/m².    MEDICATIONS    Current Outpatient Medications on File Prior to Visit:  Cholecalciferol (Vitamin D) 50 MCG (2000 UT) tablet, Take 2,000 Units by mouth Daily., Disp: , Rfl:   famotidine (PEPCID) 20 MG tablet, Take 20 mg by mouth. daily, Disp: , Rfl:   hydroxychloroquine (PLAQUENIL) 200 MG tablet, TAKE 1 TABLET BY MOUTH DAILY FOR LUPUS, Disp: , Rfl:   lisinopril (PRINIVIL,ZESTRIL) 20 MG tablet, TAKE 1 TABLET(20 MG) BY MOUTH EVERY DAY, Disp: 90 tablet, Rfl: 0  meloxicam (MOBIC) 15 MG tablet, " TAKE 1 TABLET(15 MG) BY MOUTH EVERY DAY, Disp: 90 tablet, Rfl: 0  metoprolol succinate XL (TOPROL-XL) 25 MG 24 hr tablet, TAKE 1 TABLET BY MOUTH DAILY, Disp: 90 tablet, Rfl: 0  triamterene-hydrochlorothiazide (MAXZIDE-25) 37.5-25 MG per tablet, Take 1 tablet by mouth Daily., Disp: 90 tablet, Rfl: 1    No current facility-administered medications on file prior to visit.        HISTORY OF PRESENT ILLNESS    Paola presents for annual health maintenance visit.    Last health maintenance visit: approximately 1 year ago  General health: good  Lifestyle:  Attempting to lose weight?: Yes   Diet: eats moderately healthy    Tobacco: Never used   Alcohol: 1-2 occasions/week    Reproductive health:  Sexually active?: Yes   Sexual problems?: No problems  Concern for STD?: No    Sees Gynecologist?: No   Aileen/Postmenopausal?: Yes   Domestic abuse concerns: No   Depression Screening:     PHQ-2: 0 (Not depressed)  PHQ-9: 0 (Negative screening for depression)    Patient Care Team:  Laurence Jacob APRN as PCP - General (Nurse Practitioner)      ALLERGIES  No Known Allergies     PFSH:     The following portions of the patient's history were reviewed and updated as appropriate: Allergies / Current Medications / Past Medical History / Surgical History / Social History / Family History    PROBLEM LIST   Patient Active Problem List:    Right shoulder tendinitis    Right carpal tunnel syndrome    Impingement syndrome of right shoulder    Arthritis of carpometacarpal (CMC) joint of right thumb      PAST MEDICAL HISTORY  Past Medical History:  No date: Acid reflux disease  No date: Anxiety  No date: Arthritis  No date: Asthma  No date: Callus  No date: Fallen arches  No date: Heart disease  No date: Heart disease  No date: Hemorrhoids  No date: High blood pressure  No date: Hypertension  No date: Ingrown toenail  No date: Plantar warts  No date: Rectal bleeding    SURGICAL HISTORY  Past Surgical History:  No date: COLONOSCOPY  No  date: DENTAL PROCEDURE  No date: FOOT SURGERY; Bilateral    SOCIAL HISTORY  Social History   Socioeconomic History     Marital status:    Tobacco Use     Smoking status: Never Smoker     Smokeless tobacco: Never Used   Vaping Use     Vaping Use: Never used   Substance and Sexual Activity     Alcohol use: Yes       Alcohol/week: 1.0 standard drink       Types: 1 Glasses of wine per week       Comment: I only drink sporadically.     Drug use: Never     Sexual activity: Defer      FAMILY HISTORY  Review of patient's family history indicates:  Problem: Heart disease     Relation: Mother         Age of Onset: (Not Specified)  Problem: Osteoporosis     Relation: Mother         Age of Onset: (Not Specified)  Problem: Arthritis     Relation: Mother         Age of Onset: (Not Specified)  Problem: Heart disease     Relation: Father         Age of Onset: (Not Specified)  Problem: Cancer     Relation: Father         Age of Onset: (Not Specified)  Problem: Diabetes     Relation: Neg Hx         Age of Onset: (Not Specified)  Problem: Stroke     Relation: Neg Hx         Age of Onset: (Not Specified)      IMMUNIZATION HISTORY    Immunization History  Administered            Date(s) Administered   COVID-19 (JAMAAL)    03/11/2021     COVID-19 (MODERNA) 1st, 2nd, 3rd Dose Only                         10/23/2021     Flu Vaccine Intradermal Quad 18-64YR                         10/01/2021     Influenza, Unspecified                         10/01/2019  10/09/2020     Pneumococcal Conjugate 20-Valent (PCV20)                         05/24/2022        Labs:    Lab Results      Component                Value               Date                      NA                       138                 04/30/2021                K                        4.0                 04/30/2021                CALCIUM                  9.3                 04/30/2021                AST                      18                  04/30/2021                ALT                       22                  04/30/2021                BUN                      10                  04/30/2021                CREATININE               0.82                04/30/2021                CREATININE               0.96 (H)            10/02/2020                CREATININE               0.78                02/05/2020              Lab Results      Component                Value               Date                      HGBA1C                   5.4                 04/30/2021                TSH                      3.040               04/30/2021              Lab Results      Component                Value               Date                      LDL                      68 (L)              04/30/2021                HDL                      52                  04/30/2021                TRIG                     226 (H)             04/30/2021                CHOLHDLRATIO             3.2                 04/30/2021              Lab Results      Component                Value               Date                      [id number]                 29.3 (L)            04/30/2021               Lab Results      Component                Value               Date                      WBC                      11.34 (H)           04/30/2021                HGB                      14.9                04/30/2021                MCV                      93.6                04/30/2021                PLT                      303                 04/30/2021              No results found for: PROTEIN, GLUCOSEU, BLOODU, NITRITEU, LEUKOCYTESUR    No results found for: HEPCVIRUSABY        ASSESSMENT & PLAN    ANNUAL WELLNESS EXAM / PHYSICAL     HEALTHCARE MAINTENANCE ISSUES    Cancer Screening:  Colon: Initial/Next screening at age: CURRENT  Repeat colon cancer screening: every 10 years  Breast: Recommended monthly self exams; annual professional exam  Mammogram: every 1 year  Cervical: 3 years  Skin: Monthly self skin examination, annual exam  by health professional      Lifestyle Counseling:  Lifestyle Modifications: Attempt to lose weight, Continue good lifestyle choices/modifications and Improve dietary compliance  Safety Issues: Always wear seatbelt, Avoid texting while driving   Use sunscreen, regular skin examination  Recommended annual dental/vision examination.  Emotional/Stress/Sleep: Reviewed and  given when appropriate      This is a chronic problem.  Current episode has been for longer than 6 months.  Problem has gradually been improving since she has been on vitamin D supplements.  She denies any excessive fatigue, hair loss, skin changes due to the vitamin D deficiency.  She tries to eat a well-balanced diet.  She has been on the vitamin D daily, no compliance problems.  Condition is stable.    Patient is complaining of perimenopausal symptoms.  She states that she started getting excessive hair growth on her chest, neck, and face.  She states that likely she was undiagnosed but PCOS was likely a condition that she suffered from due to never having children.  Now that she is starting to have perimenopausal symptoms with irregular menstrual cycles.  Last menstrual cycle was 4/22/2022.  With complaint of the hirsutism, will have patient follow-up with OB/GYN for further work-up and management.  Patient is agreeable    Earache   There is pain in both ears. This is a recurrent problem. The current episode started more than 1 month ago. The problem has been waxing and waning. There has been no fever. Pertinent negatives include no coughing, headaches or vomiting. She has tried antibiotics for the symptoms. Her past medical history is significant for a chronic ear infection.   Hyperlipidemia  This is a chronic problem. The current episode started more than 1 year ago. Recent lipid tests were reviewed and are variable. Exacerbating diseases include obesity. Pertinent negatives include no shortness of breath. Current antihyperlipidemic  "treatment includes diet change.   Hypertension  This is a chronic problem. The current episode started more than 1 year ago. The problem is controlled. Pertinent negatives include no anxiety, headaches, peripheral edema or shortness of breath. Current antihypertension treatment includes beta blockers and diuretics. The current treatment provides significant improvement. There are no compliance problems.        Objective   Vital Signs:   /80   Pulse 86   Temp 97.5 °F (36.4 °C)   Ht 170.2 cm (67\")   Wt (!) 155 kg (341 lb 12.8 oz)   SpO2 99%   BMI 53.53 kg/m²     Physical Exam  Vitals reviewed.   Constitutional:       Appearance: Normal appearance. She is well-developed. She is morbidly obese.   HENT:      Head: Normocephalic and atraumatic.      Right Ear: External ear normal. Tympanic membrane is erythematous.      Left Ear: Tympanic membrane and external ear normal.      Mouth/Throat:      Pharynx: No oropharyngeal exudate.   Eyes:      Conjunctiva/sclera: Conjunctivae normal.      Pupils: Pupils are equal, round, and reactive to light.   Cardiovascular:      Rate and Rhythm: Normal rate and regular rhythm.      Heart sounds: No murmur heard.  Pulmonary:      Effort: Pulmonary effort is normal.      Breath sounds: Normal breath sounds. No wheezing or rhonchi.   Skin:     General: Skin is warm and dry.   Neurological:      Mental Status: She is alert and oriented to person, place, and time.   Psychiatric:         Mood and Affect: Mood and affect normal.         Behavior: Behavior normal.         Thought Content: Thought content normal.         Judgment: Judgment normal.        Result Review :   The following data was reviewed by: DEWEY Stuart on 08/05/2022:      Data reviewed: Recent urgent care notes            Current Outpatient Medications on File Prior to Visit   Medication Sig Dispense Refill   • Cholecalciferol (Vitamin D) 50 MCG (2000 UT) tablet Take 2,000 Units by mouth Daily.     • " famotidine (PEPCID) 20 MG tablet Take 20 mg by mouth. daily     • hydroxychloroquine (PLAQUENIL) 200 MG tablet TAKE 1 TABLET BY MOUTH DAILY FOR LUPUS     • lisinopril (PRINIVIL,ZESTRIL) 20 MG tablet TAKE 1 TABLET(20 MG) BY MOUTH EVERY DAY 90 tablet 0   • meloxicam (MOBIC) 15 MG tablet TAKE 1 TABLET(15 MG) BY MOUTH EVERY DAY 90 tablet 0   • metoprolol succinate XL (TOPROL-XL) 25 MG 24 hr tablet TAKE 1 TABLET BY MOUTH DAILY 90 tablet 0   • triamterene-hydrochlorothiazide (MAXZIDE-25) 37.5-25 MG per tablet Take 1 tablet by mouth Daily. 90 tablet 1     No current facility-administered medications on file prior to visit.        Assessment and Plan    Diagnoses and all orders for this visit:    1. Annual physical exam (Primary)  -     CBC & Differential  -     Comprehensive Metabolic Panel  -     TSH    2. Hypertriglyceridemia  Comments:  Triglycerides were elevated at last lab check.  We will recheck labs adjust medication if needed  Orders:  -     Lipid Panel    3. Vitamin D deficiency  Comments:  We will recheck labs, adjust medication if needed  Orders:  -     Vitamin D 25 Hydroxy    4. Essential hypertension  Comments:  Blood pressure stable at 136/80 today.  Denies any headache chest pain or change in vision.  We will continue to monitor.    5. Encounter for hepatitis C screening test for low risk patient  -     Hepatitis C antibody    6. Chronic ear pain, bilateral  Comments:  Will refer over to ENT for further work-up and management.  We will prescribe Dermotic until  patient see ENT.  Patient is agreeable treatment plan,   Orders:  -     Ambulatory Referral to ENT (Otolaryngology)    7. Polyarthralgia  Comments:  Seen rheumatology in the past currently on Plaquenil she does not want like to refer over to rheumatology.  Orders:  -     Ambulatory Referral to Rheumatology    8. Premenopausal patient  -     Ambulatory Referral to Obstetrics / Gynecology    9. Hirsutism  Comments:  OB/GYN referral to assist with the  all she hirsutism   Orders:  -     Ambulatory Referral to Obstetrics / Gynecology    Other orders  -     Cancel: Tdap Vaccine Greater Than or Equal To 8yo IM  -     fluocinolone acetonide (DermOtic) 0.01 % oil otic oil; Administer  into both ears 2 (Two) Times a Day.  Dispense: 20 mL; Refill: 2        Follow Up   Return in about 6 months (around 2/5/2023) for Recheck.  Patient was given instructions and counseling regarding her condition or for health maintenance advice. Please see specific information pulled into the AVS if appropriate.

## 2022-08-08 DIAGNOSIS — R79.89 HIGH SERUM VITAMIN D: Primary | ICD-10-CM

## 2022-08-10 ENCOUNTER — TELEPHONE (OUTPATIENT)
Dept: FAMILY MEDICINE CLINIC | Facility: CLINIC | Age: 52
End: 2022-08-10

## 2022-08-10 NOTE — TELEPHONE ENCOUNTER
Pharmacy Name:  [facility]    Pharmacy representative name: JEWEL    Pharmacy representative phone number: [phone]    What medication are you calling in regards to: fluocinolone acetonide (DermOtic) 0.01 % oil otic oil    Who is the provider that prescribed the medication: DEWEY AKERS    Additional notes: [facility] WOULD LIKE TO KNOW HOW MANY DROPS PATIENT NEEDS TO PUT IN THEIR EARS. A CALL CAN BE MADE OR AN UPDATED SCRIPTS CAN BE SENT.

## 2022-08-11 RX ORDER — FLUOCINOLONE ACETONIDE 0.11 MG/ML
OIL AURICULAR (OTIC)
Qty: 20 ML | Refills: 2 | Status: SHIPPED | OUTPATIENT
Start: 2022-08-11

## 2022-08-16 ENCOUNTER — TELEPHONE (OUTPATIENT)
Dept: FAMILY MEDICINE CLINIC | Facility: CLINIC | Age: 52
End: 2022-08-16

## 2022-08-16 NOTE — TELEPHONE ENCOUNTER
Caller: [id number] - [place], [place] - [address] AT [facility] OF [address] & [place] - [phone] [facility] [phone] [place]    Relationship: Pharmacy         What is the best time to reach you: ANYTIME     Who are you requesting to speak with (clinical staff, provider,  specific staff member): CLINICAL    Do you know the name of the person who called:   MARIA LUISA      What was the call regarding:      MARIA LUISA SAID THAT CLARIFICATION IS NEEDING FOR THE EAR DROPS THAT WAS PRESCRIBED. SHE SAID THE INSTRUCTIONS SAYS TO ADMINISTER  DROPS IN BOTH EARS 2 TIMES DAILY BUT DO NOT SAY HOW MANY DROPS. SHE SAID THIS IS NEEDED.         Do you require a callback:      YES

## 2022-08-29 RX ORDER — HYDROXYCHLOROQUINE SULFATE 200 MG/1
400 TABLET, FILM COATED ORAL DAILY
Qty: 180 TABLET | Refills: 2 | Status: SHIPPED | OUTPATIENT
Start: 2022-08-29 | End: 2022-09-03 | Stop reason: HOSPADM

## 2022-08-30 ENCOUNTER — OFFICE VISIT (OUTPATIENT)
Dept: OBSTETRICS AND GYNECOLOGY | Facility: CLINIC | Age: 52
End: 2022-08-30

## 2022-08-30 VITALS
SYSTOLIC BLOOD PRESSURE: 140 MMHG | WEIGHT: 293 LBS | BODY MASS INDEX: 45.99 KG/M2 | HEIGHT: 67 IN | HEART RATE: 94 BPM | DIASTOLIC BLOOD PRESSURE: 88 MMHG

## 2022-08-30 DIAGNOSIS — N95.1 PERIMENOPAUSAL VASOMOTOR SYMPTOMS: ICD-10-CM

## 2022-08-30 DIAGNOSIS — L67.8 ABNORMAL FACIAL HAIR: Primary | ICD-10-CM

## 2022-08-30 LAB — HBA1C MFR BLD: 5.8 % (ref 4.8–5.6)

## 2022-08-30 PROCEDURE — 83036 HEMOGLOBIN GLYCOSYLATED A1C: CPT | Performed by: NURSE PRACTITIONER

## 2022-08-30 PROCEDURE — 99202 OFFICE O/P NEW SF 15 MIN: CPT | Performed by: NURSE PRACTITIONER

## 2022-08-30 NOTE — PROGRESS NOTES
"GYN Visit    CC:   Chief Complaint   Patient presents with   • Establish Care     Discuss pre menopausal and hormone medications        HPI:   51 y.o.No obstetric history on file.     Here to establish care, has questions on perimenopause.    LMP 4/24/22, previous was in October 2021    Is having abnormal hair growth on her face, hard and wiry,  Also growing some chest hair.    Having daily hot flashes, no night sweats.     No issues with vaginal dryness.    Primary concern is abnormal hair growth    History: PMHx, Meds, Allergies, PSHx, Social Hx, and POBHx all reviewed and updated.    Review of Systems   Constitutional: Negative.    HENT: Negative.    Eyes: Negative.    Respiratory: Negative.    Cardiovascular: Negative.    Gastrointestinal: Negative.    Endocrine: Negative.    Genitourinary: Negative.         Two cycles a year  Hot flashes  Abnormal facial/chest hair   Musculoskeletal: Negative.    Skin: Negative.    Allergic/Immunologic: Negative.         No new allergies.   Neurological: Negative.    Hematological: Negative.    Psychiatric/Behavioral: Negative.        PHYSICAL EXAM:  /88 (BP Location: Left arm, Patient Position: Sitting, Cuff Size: Adult)   Pulse 94   Ht 170.2 cm (67.01\")   Wt (!) 156 kg (345 lb)   Breastfeeding No   BMI 54.02 kg/m²      Physical Exam  Vitals and nursing note reviewed.   Constitutional:       Appearance: Normal appearance. She is well-developed and well-groomed.   Neurological:      Mental Status: She is alert.   Psychiatric:         Attention and Perception: Attention and perception normal.         Mood and Affect: Affect normal.         Speech: Speech normal.         Behavior: Behavior is cooperative.         Cognition and Memory: Cognition normal.         ASSESSMENT AND PLAN:  Diagnoses and all orders for this visit:    1. Abnormal facial hair (Primary)  Overview:  Discussed abnormal facial can be associated with increased insulin resistance/PCOS.  Will check A1C. "  Discussed use of spironolactone for abnormal hair growth, will discuss with PCP if appropriate to add to her medication regimen.     Orders:  -     Hemoglobin A1c    2. Perimenopausal vasomotor symptoms  Overview:  Discussed risks and benefits of HRT to include increased risk of cardiac event especially with patient's history of HTN.  Discussed use of effexor or paxil for hot flashes.  Patient prefers to not start any medication at this time.         Counseling:  • Will check A1C and consult with PCP    Follow Up:  Return as needed.          Bry Orantes, APRN  08/30/2022    [facility] [address]  [facility]  [address] [address]  Dept: [phone]  Loc: [phone]

## 2022-09-01 ENCOUNTER — APPOINTMENT (OUTPATIENT)
Dept: GENERAL RADIOLOGY | Facility: HOSPITAL | Age: 52
End: 2022-09-01

## 2022-09-01 ENCOUNTER — HOSPITAL ENCOUNTER (INPATIENT)
Facility: HOSPITAL | Age: 52
LOS: 1 days | Discharge: HOME | End: 2022-09-03
Attending: EMERGENCY MEDICINE | Admitting: HOSPITALIST

## 2022-09-01 DIAGNOSIS — I48.91 ATRIAL FIBRILLATION WITH RVR: Primary | ICD-10-CM

## 2022-09-01 LAB
ALBUMIN SERPL-MCNC: 3.8 G/DL (ref 3.5–5.2)
ALBUMIN/GLOB SERPL: 1.4 G/DL
ALP SERPL-CCNC: 71 U/L (ref 39–117)
ALT SERPL W P-5'-P-CCNC: 23 U/L (ref 1–33)
ANION GAP SERPL CALCULATED.3IONS-SCNC: 8.5 MMOL/L (ref 5–15)
AST SERPL-CCNC: 20 U/L (ref 1–32)
BASOPHILS # BLD AUTO: 0.06 10*3/MM3 (ref 0–0.2)
BASOPHILS NFR BLD AUTO: 0.7 % (ref 0–1.5)
BILIRUB SERPL-MCNC: 0.2 MG/DL (ref 0–1.2)
BUN SERPL-MCNC: 14 MG/DL (ref 6–20)
BUN/CREAT SERPL: 16.9 (ref 7–25)
CALCIUM SPEC-SCNC: 9.1 MG/DL (ref 8.6–10.5)
CHLORIDE SERPL-SCNC: 107 MMOL/L (ref 98–107)
CO2 SERPL-SCNC: 27.5 MMOL/L (ref 22–29)
CREAT SERPL-MCNC: 0.83 MG/DL (ref 0.57–1)
DEPRECATED RDW RBC AUTO: 39.5 FL (ref 37–54)
EGFRCR SERPLBLD CKD-EPI 2021: 85.5 ML/MIN/1.73
EOSINOPHIL # BLD AUTO: 0.27 10*3/MM3 (ref 0–0.4)
EOSINOPHIL NFR BLD AUTO: 3 % (ref 0.3–6.2)
ERYTHROCYTE [DISTWIDTH] IN BLOOD BY AUTOMATED COUNT: 12.2 % (ref 12.3–15.4)
GLOBULIN UR ELPH-MCNC: 2.7 GM/DL
GLUCOSE SERPL-MCNC: 160 MG/DL (ref 65–99)
HCT VFR BLD AUTO: 42.1 % (ref 34–46.6)
HGB BLD-MCNC: 14.7 G/DL (ref 12–15.9)
HOLD SPECIMEN: NORMAL
HOLD SPECIMEN: NORMAL
IMM GRANULOCYTES # BLD AUTO: 0.03 10*3/MM3 (ref 0–0.05)
IMM GRANULOCYTES NFR BLD AUTO: 0.3 % (ref 0–0.5)
LYMPHOCYTES # BLD AUTO: 2.29 10*3/MM3 (ref 0.7–3.1)
LYMPHOCYTES NFR BLD AUTO: 25.2 % (ref 19.6–45.3)
MAGNESIUM SERPL-MCNC: 2 MG/DL (ref 1.6–2.6)
MCH RBC QN AUTO: 31.4 PG (ref 26.6–33)
MCHC RBC AUTO-ENTMCNC: 34.9 G/DL (ref 31.5–35.7)
MCV RBC AUTO: 90 FL (ref 79–97)
MONOCYTES # BLD AUTO: 0.84 10*3/MM3 (ref 0.1–0.9)
MONOCYTES NFR BLD AUTO: 9.3 % (ref 5–12)
NEUTROPHILS NFR BLD AUTO: 5.58 10*3/MM3 (ref 1.7–7)
NEUTROPHILS NFR BLD AUTO: 61.5 % (ref 42.7–76)
NRBC BLD AUTO-RTO: 0 /100 WBC (ref 0–0.2)
PLATELET # BLD AUTO: 254 10*3/MM3 (ref 140–450)
PMV BLD AUTO: 8.5 FL (ref 6–12)
POTASSIUM SERPL-SCNC: 4.2 MMOL/L (ref 3.5–5.2)
PROT SERPL-MCNC: 6.5 G/DL (ref 6–8.5)
RBC # BLD AUTO: 4.68 10*6/MM3 (ref 3.77–5.28)
SODIUM SERPL-SCNC: 143 MMOL/L (ref 136–145)
TROPONIN T SERPL-MCNC: <0.01 NG/ML (ref 0–0.03)
WBC NRBC COR # BLD: 9.07 10*3/MM3 (ref 3.4–10.8)
WHOLE BLOOD HOLD COAG: NORMAL
WHOLE BLOOD HOLD SPECIMEN: NORMAL

## 2022-09-01 PROCEDURE — 99284 EMERGENCY DEPT VISIT MOD MDM: CPT

## 2022-09-01 PROCEDURE — 83735 ASSAY OF MAGNESIUM: CPT | Performed by: EMERGENCY MEDICINE

## 2022-09-01 PROCEDURE — 85379 FIBRIN DEGRADATION QUANT: CPT | Performed by: PHYSICIAN ASSISTANT

## 2022-09-01 PROCEDURE — 83880 ASSAY OF NATRIURETIC PEPTIDE: CPT | Performed by: PHYSICIAN ASSISTANT

## 2022-09-01 PROCEDURE — 84484 ASSAY OF TROPONIN QUANT: CPT | Performed by: EMERGENCY MEDICINE

## 2022-09-01 PROCEDURE — 93005 ELECTROCARDIOGRAM TRACING: CPT | Performed by: EMERGENCY MEDICINE

## 2022-09-01 PROCEDURE — 85025 COMPLETE CBC W/AUTO DIFF WBC: CPT

## 2022-09-01 PROCEDURE — 71045 X-RAY EXAM CHEST 1 VIEW: CPT

## 2022-09-01 PROCEDURE — 80053 COMPREHEN METABOLIC PANEL: CPT | Performed by: EMERGENCY MEDICINE

## 2022-09-01 PROCEDURE — 93005 ELECTROCARDIOGRAM TRACING: CPT

## 2022-09-01 PROCEDURE — 93010 ELECTROCARDIOGRAM REPORT: CPT | Performed by: INTERNAL MEDICINE

## 2022-09-01 RX ORDER — DILTIAZEM HCL IN NACL,ISO-OSM 125 MG/125
5-15 PLASTIC BAG, INJECTION (ML) INTRAVENOUS
Status: DISCONTINUED | OUTPATIENT
Start: 2022-09-01 | End: 2022-09-03 | Stop reason: HOSPADM

## 2022-09-01 RX ORDER — SODIUM CHLORIDE 0.9 % (FLUSH) 0.9 %
10 SYRINGE (ML) INJECTION AS NEEDED
Status: DISCONTINUED | OUTPATIENT
Start: 2022-09-01 | End: 2022-09-02

## 2022-09-02 ENCOUNTER — APPOINTMENT (OUTPATIENT)
Dept: CARDIOLOGY | Facility: HOSPITAL | Age: 52
End: 2022-09-02

## 2022-09-02 PROBLEM — I48.91 ATRIAL FIBRILLATION WITH RVR: Status: ACTIVE | Noted: 2022-09-02

## 2022-09-02 LAB
ANION GAP SERPL CALCULATED.3IONS-SCNC: 11.1 MMOL/L (ref 5–15)
BASOPHILS # BLD AUTO: 0.05 10*3/MM3 (ref 0–0.2)
BASOPHILS NFR BLD AUTO: 0.5 % (ref 0–1.5)
BUN SERPL-MCNC: 11 MG/DL (ref 6–20)
BUN/CREAT SERPL: 15.9 (ref 7–25)
CALCIUM SPEC-SCNC: 8.8 MG/DL (ref 8.6–10.5)
CHLORIDE SERPL-SCNC: 107 MMOL/L (ref 98–107)
CO2 SERPL-SCNC: 22.9 MMOL/L (ref 22–29)
CREAT SERPL-MCNC: 0.69 MG/DL (ref 0.57–1)
D DIMER PPP FEU-MCNC: 0.33 MCGFEU/ML (ref 0–0.57)
DEPRECATED RDW RBC AUTO: 39.4 FL (ref 37–54)
EGFRCR SERPLBLD CKD-EPI 2021: 105.2 ML/MIN/1.73
EOSINOPHIL # BLD AUTO: 0.17 10*3/MM3 (ref 0–0.4)
EOSINOPHIL NFR BLD AUTO: 1.8 % (ref 0.3–6.2)
ERYTHROCYTE [DISTWIDTH] IN BLOOD BY AUTOMATED COUNT: 12 % (ref 12.3–15.4)
GLUCOSE SERPL-MCNC: 129 MG/DL (ref 65–99)
HCT VFR BLD AUTO: 41.5 % (ref 34–46.6)
HGB BLD-MCNC: 14.4 G/DL (ref 12–15.9)
IMM GRANULOCYTES # BLD AUTO: 0.03 10*3/MM3 (ref 0–0.05)
IMM GRANULOCYTES NFR BLD AUTO: 0.3 % (ref 0–0.5)
LYMPHOCYTES # BLD AUTO: 1.92 10*3/MM3 (ref 0.7–3.1)
LYMPHOCYTES NFR BLD AUTO: 20.3 % (ref 19.6–45.3)
MAGNESIUM SERPL-MCNC: 1.7 MG/DL (ref 1.6–2.6)
MCH RBC QN AUTO: 31.3 PG (ref 26.6–33)
MCHC RBC AUTO-ENTMCNC: 34.7 G/DL (ref 31.5–35.7)
MCV RBC AUTO: 90.2 FL (ref 79–97)
MONOCYTES # BLD AUTO: 0.73 10*3/MM3 (ref 0.1–0.9)
MONOCYTES NFR BLD AUTO: 7.7 % (ref 5–12)
NEUTROPHILS NFR BLD AUTO: 6.58 10*3/MM3 (ref 1.7–7)
NEUTROPHILS NFR BLD AUTO: 69.4 % (ref 42.7–76)
NRBC BLD AUTO-RTO: 0 /100 WBC (ref 0–0.2)
NT-PROBNP SERPL-MCNC: 146.3 PG/ML (ref 0–900)
PLATELET # BLD AUTO: 260 10*3/MM3 (ref 140–450)
PMV BLD AUTO: 8.7 FL (ref 6–12)
POTASSIUM SERPL-SCNC: 4 MMOL/L (ref 3.5–5.2)
QT INTERVAL: 350 MS
RBC # BLD AUTO: 4.6 10*6/MM3 (ref 3.77–5.28)
SODIUM SERPL-SCNC: 141 MMOL/L (ref 136–145)
TSH SERPL DL<=0.05 MIU/L-ACNC: 3.05 UIU/ML (ref 0.27–4.2)
WBC NRBC COR # BLD: 9.48 10*3/MM3 (ref 3.4–10.8)

## 2022-09-02 PROCEDURE — 25010000002 SULFUR HEXAFLUORIDE MICROSPH 60.7-25 MG RECONSTITUTED SUSPENSION: Performed by: INTERNAL MEDICINE

## 2022-09-02 PROCEDURE — 80048 BASIC METABOLIC PNL TOTAL CA: CPT | Performed by: PHYSICIAN ASSISTANT

## 2022-09-02 PROCEDURE — 83735 ASSAY OF MAGNESIUM: CPT | Performed by: PHYSICIAN ASSISTANT

## 2022-09-02 PROCEDURE — 94799 UNLISTED PULMONARY SVC/PX: CPT

## 2022-09-02 PROCEDURE — 85025 COMPLETE CBC W/AUTO DIFF WBC: CPT | Performed by: PHYSICIAN ASSISTANT

## 2022-09-02 PROCEDURE — 84443 ASSAY THYROID STIM HORMONE: CPT | Performed by: INTERNAL MEDICINE

## 2022-09-02 PROCEDURE — 99223 1ST HOSP IP/OBS HIGH 75: CPT | Performed by: PHYSICIAN ASSISTANT

## 2022-09-02 PROCEDURE — 93306 TTE W/DOPPLER COMPLETE: CPT

## 2022-09-02 RX ORDER — METOPROLOL SUCCINATE 25 MG/1
25 TABLET, EXTENDED RELEASE ORAL DAILY
Status: DISCONTINUED | OUTPATIENT
Start: 2022-09-02 | End: 2022-09-02

## 2022-09-02 RX ORDER — SODIUM CHLORIDE 0.9 % (FLUSH) 0.9 %
10 SYRINGE (ML) INJECTION AS NEEDED
Status: DISCONTINUED | OUTPATIENT
Start: 2022-09-02 | End: 2022-09-03 | Stop reason: HOSPADM

## 2022-09-02 RX ORDER — HYDROXYCHLOROQUINE SULFATE 200 MG/1
400 TABLET, FILM COATED ORAL DAILY
Status: DISCONTINUED | OUTPATIENT
Start: 2022-09-02 | End: 2022-09-03 | Stop reason: HOSPADM

## 2022-09-02 RX ORDER — METOPROLOL SUCCINATE 50 MG/1
50 TABLET, EXTENDED RELEASE ORAL DAILY
Status: DISCONTINUED | OUTPATIENT
Start: 2022-09-03 | End: 2022-09-03 | Stop reason: HOSPADM

## 2022-09-02 RX ORDER — ONDANSETRON 2 MG/ML
4 INJECTION INTRAMUSCULAR; INTRAVENOUS EVERY 6 HOURS PRN
Status: DISCONTINUED | OUTPATIENT
Start: 2022-09-02 | End: 2022-09-03 | Stop reason: HOSPADM

## 2022-09-02 RX ORDER — METOPROLOL SUCCINATE 25 MG/1
25 TABLET, EXTENDED RELEASE ORAL ONCE
Status: COMPLETED | OUTPATIENT
Start: 2022-09-02 | End: 2022-09-02

## 2022-09-02 RX ORDER — SODIUM CHLORIDE 0.9 % (FLUSH) 0.9 %
10 SYRINGE (ML) INJECTION EVERY 12 HOURS SCHEDULED
Status: DISCONTINUED | OUTPATIENT
Start: 2022-09-02 | End: 2022-09-03 | Stop reason: HOSPADM

## 2022-09-02 RX ORDER — FAMOTIDINE 20 MG/1
20 TABLET, FILM COATED ORAL DAILY
Status: DISCONTINUED | OUTPATIENT
Start: 2022-09-02 | End: 2022-09-03 | Stop reason: HOSPADM

## 2022-09-02 RX ORDER — CHOLECALCIFEROL (VITAMIN D3) 125 MCG
5 CAPSULE ORAL NIGHTLY PRN
Status: DISCONTINUED | OUTPATIENT
Start: 2022-09-02 | End: 2022-09-03 | Stop reason: HOSPADM

## 2022-09-02 RX ORDER — ALUMINA, MAGNESIA, AND SIMETHICONE 2400; 2400; 240 MG/30ML; MG/30ML; MG/30ML
15 SUSPENSION ORAL EVERY 6 HOURS PRN
Status: DISCONTINUED | OUTPATIENT
Start: 2022-09-02 | End: 2022-09-03 | Stop reason: HOSPADM

## 2022-09-02 RX ORDER — SODIUM CHLORIDE 9 MG/ML
40 INJECTION, SOLUTION INTRAVENOUS AS NEEDED
Status: DISCONTINUED | OUTPATIENT
Start: 2022-09-02 | End: 2022-09-03 | Stop reason: HOSPADM

## 2022-09-02 RX ADMIN — Medication 10 ML: at 08:22

## 2022-09-02 RX ADMIN — METOPROLOL SUCCINATE 25 MG: 25 TABLET, EXTENDED RELEASE ORAL at 02:16

## 2022-09-02 RX ADMIN — METOPROLOL SUCCINATE 25 MG: 25 TABLET, EXTENDED RELEASE ORAL at 11:14

## 2022-09-02 RX ADMIN — SULFUR HEXAFLUORIDE 3 ML: KIT at 14:49

## 2022-09-02 RX ADMIN — HYDROXYCHLOROQUINE SULFATE 400 MG: 200 TABLET ORAL at 08:22

## 2022-09-02 RX ADMIN — APIXABAN 5 MG: 5 TABLET, FILM COATED ORAL at 20:30

## 2022-09-02 RX ADMIN — SODIUM CHLORIDE 1000 ML: 9 INJECTION, SOLUTION INTRAVENOUS at 00:00

## 2022-09-02 RX ADMIN — Medication 5 MG: at 02:16

## 2022-09-02 RX ADMIN — FAMOTIDINE 20 MG: 20 TABLET ORAL at 08:22

## 2022-09-02 RX ADMIN — Medication 5 MG/HR: at 00:15

## 2022-09-02 RX ADMIN — Medication 10 ML: at 20:30

## 2022-09-02 NOTE — PROGRESS NOTES
Patient was seen and examined this morning.  Reports long history of palpitations but has never been diagnosed with atrial fibrillation.  Has not seen a cardiologist in several years.  -TSH within normal limits  -Echo pending  -Consult on-call cardiologist Dr. Frank Boykin  -Increase beta-blocker, continue Cardizem drip, defer to cardiology for further management.  -[id number] score 2, counseled on anticoagulation, agreeable, no hx of bleeding.  Starting eliquis tonight

## 2022-09-02 NOTE — PLAN OF CARE
Goal Outcome Evaluation:  Patient alert throughout shift; patient VS stable throughout shift; patient Diltiazem drip D/C today as patient continues to be in SR; patient out of bed to chair for 2 hours today; patient currently resting in bed with call light in reach

## 2022-09-02 NOTE — H&P
" [facility]   HOSPITALIST HISTORY AND PHYSICAL  Date: 2022   Patient Name: Paola Sow  : 1970  MRN: [id number]  Primary Care Physician:  Laurence Jacob APRN  Date of admission: 2022    Subjective   Subjective     Chief Complaint: Palpitations, shortness of breath    HPI:    Paola Sow is a 51 y.o. female who has a past medical history of heart disease, \"chronic inflammation\", hypertension, and GERD who presented to emergency department for sudden onset of shortness of breath and heart palpitations.  Denies any recent fever, chills, cough, or known sick contacts.  No similar symptoms previously. She does not take any blood thinners.  On arrival in the emergency department patient's heart rate was in the 140s.  She was found to be in A. fib with RVR.  Was placed on a diltiazem drip after having received a 10 mg diltiazem bolus without improvement in her rate.  Laboratory evaluation overall unremarkable.  Her heart rate improved to low 100s after being placed on diltiazem drip.  Because of the above, the hospitalist team was contacted to admit the patient for further management.      Personal History     Past Medical History:  Past Medical History:   Diagnosis Date   • Acid reflux disease    • Anxiety    • Arthritis    • Asthma    • Callus    • Fallen arches    • Heart disease    • Heart disease    • Hemorrhoids    • High blood pressure    • Hypertension    • Ingrown toenail    • Plantar warts    • Rectal bleeding        Past Surgical History:  Past Surgical History:   Procedure Laterality Date   • COLONOSCOPY     • DENTAL PROCEDURE     • FOOT SURGERY Bilateral        Family History:   Family History   Problem Relation Age of Onset   • Heart disease Mother    • Osteoporosis Mother    • Arthritis Mother    • Heart disease Father    • Cancer Father    • Diabetes Neg Hx    • Stroke Neg Hx        Social History:    reports that she has never smoked. She has never used smokeless tobacco. She " "reports current alcohol use of about 1.0 standard drink of alcohol per week. She reports that she does not use drugs.    Home Medications:  famotidine, fluocinolone acetonide, hydroxychloroquine, lisinopril, meloxicam, metoprolol succinate XL, and triamterene-hydrochlorothiazide    Allergies:  No Known Allergies    Review of Systems   All systems were reviewed and negative except for: Heart palpitations, shortness of breath    Objective   Objective     Vitals:   Temp:  [98.3 °F (36.8 °C)] 98.3 °F (36.8 °C)  Heart Rate:  [70-95] 92  Resp:  [20] 20  BP: (120-163)/() 120/103    Physical Exam    Constitutional: Awake, alert, no acute distress   Eyes: Pupils equal, sclerae anicteric, no conjunctival injection   HENT: NCAT, mucous membranes moist   Neck: Supple, no thyromegaly, no lymphadenopathy, trachea midline   Respiratory: Clear to auscultation bilaterally, nonlabored respirations    Cardiovascular: Irregularly irregular, no murmurs, rubs, or gallops, palpable pedal pulses bilaterally   Gastrointestinal: Positive bowel sounds, obese abdomen is soft, nontender, nondistended   Musculoskeletal: No bilateral ankle edema, no clubbing or cyanosis to extremities   Psychiatric: Appropriate affect, cooperative   Neurologic: Oriented x 3, strength symmetric in all extremities, Cranial Nerves grossly intact to confrontation, speech clear   Skin: No rashes     Imaging:   No results found.    Result Review    Result Review:  I have personally reviewed the results from the time of this admission to 9/2/2022 02:20 EDT and agree with these findings:  [x]  Laboratory  [x]  Microbiology  [x]  Radiology  [x]  EKG/Telemetry   []  Cardiology/Vascular   []  Pathology  [x]  Old records  []  Other:      Assessment & Plan   Assessment / Plan     Assessment:   New onset A. fib RVR  \"Chronic inflammation\" on Plaquenil  Essential hypertension  Hyperlipidemia  Obstructive sleep apnea on home CPAP  GERD      Plan:    Admit to hospitalist " service  Labs and imaging reviewed  Will defer to daytime team to consult cardiology should they see fit  Continue diltiazem drip for now.  Resume patient's home metoprolol and wean diltiazem drip as able  Resume patient's home Plaquenil  Add on BNP and D-dimer to rule out other causes of A. fib.  If no other source found question if patient's lupus is leading to her A. Fib.  Obtain echo in the morning    [id number] is 1.  Will consider anticoagulation once patient seen by cardiology.    Patient's clinical course will dictate further management  Discussed with ED physician, RN      DVT prophylaxis:  Mechanical DVT prophylaxis orders are present.    CODE STATUS:    Code Status (Patient has no pulse and is not breathing): CPR (Attempt to Resuscitate)  Medical Interventions (Patient has pulse or is breathing): Full Support      Admission Status:  I believe this patient meets inpatient status.    Electronically signed by CELINA Arevalo, 09/02/22, 12:42 AM EDT.

## 2022-09-02 NOTE — ED PROVIDER NOTES
"Time: 11:33 PM EDT    Chief Complaint: irregular heart beat    History of Present Illness:  Patient is a 51 y.o. year old female that presents to the emergency department with irregular heart beat. Pt reports her symptoms began a couple of hours ago. She states she can feel heart heart \"racing and skipping beats.\" She reports she was watching TV when her symptoms began. She admits to mild SOB and mild dizziness, but she denies chest pain, leg swelling, leg pain, or abdominal pain. Pt states she is unsure of hx of Afib, but she admits to hx of LBBB. Pt states she is currently taking metoprolol and lisinopril.      HPI    Similar Symptoms Previously: no  Recently seen: yes, 8/30/22      Patient Care Team  Primary Care Provider: Laurence Jacob APRN    Past Medical History:     No Known Allergies  Past Medical History:   Diagnosis Date   • Acid reflux disease    • Anxiety    • Arthritis    • Asthma    • Callus    • Fallen arches    • Heart disease    • Heart disease    • Hemorrhoids    • High blood pressure    • Hypertension    • Ingrown toenail    • Plantar warts    • Rectal bleeding      Past Surgical History:   Procedure Laterality Date   • COLONOSCOPY     • DENTAL PROCEDURE     • FOOT SURGERY Bilateral      Family History   Problem Relation Age of Onset   • Heart disease Mother    • Osteoporosis Mother    • Arthritis Mother    • Heart disease Father    • Cancer Father    • Diabetes Neg Hx    • Stroke Neg Hx        Home Medications:  Prior to Admission medications    Medication Sig Start Date End Date Taking? Authorizing Provider   famotidine (PEPCID) 20 MG tablet Take 20 mg by mouth. daily    Provider, MD Jori   fluocinolone acetonide (DermOtic) 0.01 % oil otic oil Use 5 drops bilateral ears twice daily for 3 weeks. 8/11/22   Laurence Jacob APRN   hydroxychloroquine (PLAQUENIL) 200 MG tablet Take 2 tablets by mouth Daily for 90 days. Indications: Systemic Lupus Erythematosus 8/29/22 11/27/22  " "Laurence Jacobdenise APRLALITA   lisinopril (PRINIVIL,ZESTRIL) 20 MG tablet TAKE 1 TABLET(20 MG) BY MOUTH EVERY DAY 6/13/22   Laurence Jacob APRLALITA   meloxicam (MOBIC) 15 MG tablet TAKE 1 TABLET(15 MG) BY MOUTH EVERY DAY 7/14/22   Laurence Jacob APRLALITA   metoprolol succinate XL (TOPROL-XL) 25 MG 24 hr tablet TAKE 1 TABLET BY MOUTH DAILY 6/13/22   Oz Jacobkatie Tredenise APRLALITA   triamterene-hydrochlorothiazide (MAXZIDE-25) 37.5-25 MG per tablet Take 1 tablet by mouth Daily. 6/28/21   Espinoza Jacobkike TreDEWEY walker        Social History:   Social History     Tobacco Use   • Smoking status: Never Smoker   • Smokeless tobacco: Never Used   Vaping Use   • Vaping Use: Never used   Substance Use Topics   • Alcohol use: Yes     Alcohol/week: 1.0 standard drink     Types: 1 Glasses of wine per week     Comment: I only drink sporadically.   • Drug use: Never       Record Review:  I have reviewed the patient's records in [employer].     Review of Systems:  Review of Systems   Constitutional: Negative for chills and fever.   HENT: Negative for congestion, rhinorrhea and sore throat.    Eyes: Negative for pain and visual disturbance.   Respiratory: Positive for shortness of breath (mild). Negative for apnea, cough and chest tightness.    Cardiovascular: Negative for chest pain, palpitations and leg swelling.        + irregular HB   Gastrointestinal: Negative for abdominal pain, diarrhea, nausea and vomiting.   Genitourinary: Negative for difficulty urinating and dysuria.   Musculoskeletal: Negative for joint swelling and myalgias.        - leg pain   Skin: Negative for color change.   Neurological: Positive for dizziness (mild). Negative for seizures and headaches.   Psychiatric/Behavioral: Negative.    All other systems reviewed and are negative.       Physical Exam:  /76 (BP Location: Left arm, Patient Position: Lying)   Pulse 78   Temp 97.3 °F (36.3 °C) (Oral)   Resp 16   Ht 170.2 cm (67\")   Wt (!) 157 kg (345 lb 3.9 " oz)   SpO2 98%   BMI 54.07 kg/m²     Physical Exam  Vitals and nursing note reviewed.   Constitutional:       General: She is not in acute distress.     Appearance: Normal appearance. She is not toxic-appearing.   HENT:      Head: Normocephalic and atraumatic.      Jaw: There is normal jaw occlusion.   Eyes:      General: Lids are normal.      Extraocular Movements: Extraocular movements intact.      Conjunctiva/sclera: Conjunctivae normal.      Pupils: Pupils are equal, round, and reactive to light.   Cardiovascular:      Rate and Rhythm: Tachycardia present. Rhythm irregular.      Pulses: Normal pulses.      Heart sounds: Normal heart sounds.   Pulmonary:      Effort: Pulmonary effort is normal. No respiratory distress.      Breath sounds: Normal breath sounds. No wheezing or rhonchi.   Abdominal:      General: Abdomen is flat.      Palpations: Abdomen is soft.      Tenderness: There is no abdominal tenderness. There is no guarding or rebound.   Musculoskeletal:         General: Normal range of motion.      Cervical back: Normal range of motion and neck supple.      Right lower leg: No edema.      Left lower leg: No edema.   Skin:     General: Skin is warm and dry.   Neurological:      Mental Status: She is alert and oriented to person, place, and time. Mental status is at baseline.   Psychiatric:         Mood and Affect: Mood normal.                  Medications in the Emergency Department:  Medications   dilTIAZem (CARDIZEM) 125 mg in 125 mL 0.7% sodium chloride  infusion (7.5 mg/hr Intravenous Rate/Dose Change 9/2/22 0446)   famotidine (PEPCID) tablet 20 mg (20 mg Oral Given 9/2/22 0822)   hydroxychloroquine (PLAQUENIL) tablet 400 mg (400 mg Oral Given 9/2/22 0822)   sodium chloride 0.9 % flush 10 mL (has no administration in time range)   sodium chloride 0.9 % flush 10 mL (10 mL Intravenous Given 9/2/22 2030)   sodium chloride 0.9 % infusion 40 mL (has no administration in time range)   aluminum-magnesium  hydroxide-simethicone (MAALOX MAX) 400-400-40 MG/5ML suspension 15 mL (has no administration in time range)   melatonin tablet 5 mg (5 mg Oral Given 9/2/22 0216)   ondansetron (ZOFRAN) injection 4 mg (has no administration in time range)   metoprolol succinate XL (TOPROL-XL) 24 hr tablet 50 mg (has no administration in time range)   apixaban (ELIQUIS) tablet 5 mg (5 mg Oral Given 9/2/22 2030)   sodium chloride 0.9 % bolus 1,000 mL (0 mL Intravenous Stopped 9/2/22 0005)   dilTIAZem (CARDIZEM) bolus from bag 1 mg/mL 20 mg (20 mg Intravenous Bolus from Bag 9/2/22 0000)   metoprolol succinate XL (TOPROL-XL) 24 hr tablet 25 mg (25 mg Oral Given 9/2/22 1114)   Sulfur Hexafluoride Microsph (LUMASON) 60.7-25 MG IV reconstituted suspension reconstituted suspension 3 mL (3 mL Injection Given 9/2/22 1449)        Labs  Lab Results (last 24 hours)     ** No results found for the last 24 hours. **           Imaging:  No Radiology Exams Resulted Within Past 24 Hours    Procedures:  ECG 12 Lead      Date/Time: 9/1/2022 11:31 PM  Performed by: Giuseppe Mcfarland MD  Authorized by: Giuseppe Mcfarland MD   Interpreted by physician  Comments: AFib 134 bpm, LBBB, nonspecific ST changes, normal QT, changed from previous ECG          Progress                            Medical Decision Making:  MDM  Number of Diagnoses or Management Options     Amount and/or Complexity of Data Reviewed  Clinical lab tests: reviewed and ordered  Tests in the radiology section of CPT®: ordered and reviewed  Discuss the patient with other providers: yes  Independent visualization of images, tracings, or specimens: yes    Critical Care  Total time providing critical care: 30-74 minutes     Patient found to have new onset atrial fibrillation with rapid ventricular response.  Patient initiated on diltiazem bolus and infusion.  Patient discussed with hospitalist for admission.  The patient's airway is intact, vital signs, and respiratory status are safe for admission at  this time.    Total Critical Care time of 35  minutes. Total critical care time documented does not include time spent on separately billed procedures for services of nurses or physician assistants. I personally saw and examined the patient. I have reviewed all diagnostic interpretations and treatment plans as written. I was present for the key portions of any procedures performed and the inclusive time noted in any critical care statement. Critical care time includes patient management by me, time spent at the patients bedside, time to review lab and imaging results, discussing patient care, documentation in the medical record, and time spent with family or caregiver.        Final diagnoses:   Atrial fibrillation with RVR (HCC)        Disposition:  ED Disposition     ED Disposition   Decision to Admit    Condition   --    Comment   Level of Care: Telemetry [5]   Diagnosis: Atrial fibrillation with RVR (HCC) [522908]   Admitting Physician: DOROTHY URBINA [[id number]]   Attending Physician: DOROTHY URBINA [[id number]]   Isolate for COVID?: No [0]   Certification: I Certify That Inpatient Hospital Services Are Medically Necessary For Greater Than 2 Midnights               Dictated Utilizing Dragon Dictation    Documentation assistance provided by Ok Navarro acting as scribe for Giuseppe Mcfarland MD. Information recorded by the scribe was done at my direction and has been verified and validated by me.        Ok Navarro  09/01/22 2331       Ok Navarro  09/01/22 2332       Ok Navarro  09/01/22 2337       Ok Navarro  09/01/22 2525       Giuseppe Mcfarland MD  09/03/22 0367

## 2022-09-02 NOTE — PLAN OF CARE
Goal Outcome Evaluation:  Plan of Care Reviewed With: patient        Progress: improving  Outcome Evaluation: Maintaining cardizem gtt, currently running at 7.5. Patient remains in AFib. Patient using home cpap. No complaints at this time. DARREL,RN

## 2022-09-02 NOTE — ED TRIAGE NOTES
Pt states her heart has been racing and it feels irregular for the last few hours. Pt took one baby ASA.

## 2022-09-02 NOTE — CONSULTS
And date of consultation: 9/2/2022    Reason for consultation: Atrial fibrillation    HPI: A 51-year-old female  with a history of palpitations who presented with  acute onset of heart racing that started approximately 3 hours prior to her visit to the ER.  She has no chest pain, dyspnea, orthopnea, syncope or near syncope.  In the ER, she was found to be in atrial fibrillation with RVR.  She was given Cardizem 20 mg IV x1 bolus and was started on IV Cardizem drip.    She has no history of CHF, rheumatic fever, scarlet fever, hyperthyroidism, or illicit drug use.  She very seldom drinks alcohol.    Review of systems: Negative for headaches, tinnitus, vertigo, nausea, vomiting, abdominal pain, dysuria, hematuria, hemoptysis, TIA or CVA-like symptoms    Past medical and surgical history:    1.  Hypertension  2.  Situational anxiety/panic attack many years ago.  3.  Normal echocardiography and stress test in 2018 when she had palpitations.  She saw a cardiologist in [place].  4.  Arthritis  5.  GERD  6.  Surgeries and procedures: Colonoscopy, dental work and foot surgery    Allergies: NKDA    Social history: Never smoked or used illicit drugs.  Very rarely drinks alcohol.      Family history: Mother had MI in his 50s.    General examination: She was in no pain or respiratory distress.  HEENT: Sclerae nonicteric.  Neck: No JVD or carotid bruit carotid upstroke was normal.  No thyromegaly  Chest: Clear to auscultations.  No wheeze or rales  Cardiac: Irregularly irregular without S3 gallop.  2/6 systolic murmur over the right and left sternal borders.  No rub or clicks.  Abdomen: Soft, nontender, no megalies or masses.  Bowel sounds present  Extremities: No edema, cyanosis or clubbing.  Pulse intact  Neuro: Alert, oriented, no facial droop and speech was clear.    Records: Reviewed.    Records: Reviewed    1.  Atrial fibrillation with RVR  2.  Chronic left bundle branch block  3.  History of palpitations  4.   Continue IV Cardizem drip.  She was already started on beta-blocker  5.  Echo is pending  6.  Her score on AGUSTINA-vascular score was 1-2 that put her into the moderate risk for CVA without oral anticoagulation.  Start Eliquis 5 mg p.o. twice daily  7.  Her medical records will be brought to the hospital to be reviewed.  8.  Any further management will be based on her clinical course and test results.

## 2022-09-03 ENCOUNTER — READMISSION MANAGEMENT (OUTPATIENT)
Dept: CALL CENTER | Facility: HOSPITAL | Age: 52
End: 2022-09-03

## 2022-09-03 VITALS
OXYGEN SATURATION: 98 % | SYSTOLIC BLOOD PRESSURE: 127 MMHG | HEIGHT: 67 IN | BODY MASS INDEX: 45.99 KG/M2 | RESPIRATION RATE: 16 BRPM | DIASTOLIC BLOOD PRESSURE: 82 MMHG | TEMPERATURE: 98.2 F | WEIGHT: 293 LBS | HEART RATE: 75 BPM

## 2022-09-03 LAB
ASCENDING AORTA: 3.6 CM
BH CV ECHO MEAS - AO ROOT DIAM: 3.3 CM
BH CV ECHO MEAS - EDV(MOD-SP2): 111 ML
BH CV ECHO MEAS - EDV(MOD-SP4): 142 ML
BH CV ECHO MEAS - EF(MOD-SP2): 73 %
BH CV ECHO MEAS - EF(MOD-SP4): 55 %
BH CV ECHO MEAS - ESV(MOD-SP2): 30 ML
BH CV ECHO MEAS - ESV(MOD-SP4): 64 ML
BH CV ECHO MEAS - IVSD: 1.6 CM
BH CV ECHO MEAS - LA DIMENSION: 4.2 CM
BH CV ECHO MEAS - LVIDD: 5.1 CM
BH CV ECHO MEAS - LVIDS: 3.8 CM
BH CV ECHO MEAS - LVOT DIAM: 2 CM
BH CV ECHO MEAS - LVPWD: 1.5 CM
BH CV ECHO MEAS - RAP SYSTOLE: 3 MMHG
BH CV ECHO MEAS - RVDD: 2.8 CM
BH CV ECHO MEAS - RVSP: 26 MMHG
BH CV ECHO MEAS - TR MAX PG: 23 MMHG
BH CV ECHO MEAS - TR MAX VEL: 242 CM/SEC
IVRT: 95 MSEC
LEFT ATRIUM VOLUME INDEX: 37.1 ML/M2
MAXIMAL PREDICTED HEART RATE: 169 BPM
STRESS TARGET HR: 144 BPM

## 2022-09-03 PROCEDURE — 94799 UNLISTED PULMONARY SVC/PX: CPT

## 2022-09-03 PROCEDURE — 99239 HOSP IP/OBS DSCHRG MGMT >30: CPT | Performed by: INTERNAL MEDICINE

## 2022-09-03 RX ORDER — MELOXICAM 15 MG/1
15 TABLET ORAL DAILY PRN
Qty: 90 TABLET | Refills: 0
Start: 2022-09-03 | End: 2022-09-19

## 2022-09-03 RX ORDER — METOPROLOL SUCCINATE 50 MG/1
50 TABLET, EXTENDED RELEASE ORAL DAILY
Qty: 30 TABLET | Refills: 0 | Status: SHIPPED | OUTPATIENT
Start: 2022-09-03 | End: 2022-10-03

## 2022-09-03 RX ORDER — ACETAMINOPHEN 500 MG
1000 TABLET ORAL EVERY 6 HOURS PRN
Qty: 200 TABLET | Refills: 0 | Status: SHIPPED | OUTPATIENT
Start: 2022-09-03

## 2022-09-03 RX ADMIN — FAMOTIDINE 20 MG: 20 TABLET ORAL at 08:46

## 2022-09-03 RX ADMIN — Medication 10 ML: at 08:46

## 2022-09-03 RX ADMIN — HYDROXYCHLOROQUINE SULFATE 400 MG: 200 TABLET ORAL at 08:46

## 2022-09-03 RX ADMIN — METOPROLOL SUCCINATE 50 MG: 50 TABLET, EXTENDED RELEASE ORAL at 08:46

## 2022-09-03 RX ADMIN — APIXABAN 5 MG: 5 TABLET, FILM COATED ORAL at 08:46

## 2022-09-03 NOTE — DISCHARGE SUMMARY
"               [facility]         HOSPITALIST  DISCHARGE SUMMARY    Patient Name: Paola Sow    : 1970    MRN: [id number]    Date of Admission: 2022  Date of Discharge:  22  Primary Care Physician: Laurence Jacob APRN    Consults     Date and Time Order Name Status Description    2022  9:28 AM Inpatient Cardiology Consult            Final Diagnosis:  New onset afibrillation, converted to NSR at time of discharge  Chronic left bundle branch block  History of elevated inflammatory markers on Plaquenil no official rheumatologic diagnosis outpatient rheumatology follow-up pending  Arthritis, reportedly not RA, pending rheumatology evaluation  Morbid obesity  MARGOT on CPAP  Hyperlipidemia  GERD    Hospital Course       HPI:     Paola Sow is a 51 y.o. female who has a past medical history of heart disease, \"chronic inflammation\", hypertension, and GERD who presented to emergency department for sudden onset of shortness of breath and heart palpitations.  Denies any recent fever, chills, cough, or known sick contacts.  No similar symptoms previously. She does not take any blood thinners.  On arrival in the emergency department patient's heart rate was in the 140s.  She was found to be in A. fib with RVR.  Was placed on a diltiazem drip after having received a 10 mg diltiazem bolus without improvement in her rate.  Laboratory evaluation overall unremarkable.  Her heart rate improved to low 100s after being placed on diltiazem drip.  Because of the above, the hospitalist team was contacted to admit the patient for further management.    Hospital Course: Patient was treated with IV diltiazem and started on increased oral regimen.  Her metoprolol dose was doubled from her home regimen and she responded well and was actually in normal sinus rhythm at time of discharge.  Echocardiogram was fairly unremarkable as noted below.  Cardiology assisted with evaluation.  Eliquis was added for other " blood pressure medications were discontinued at time of discharge given lower normal blood pressures may need to resume in the future monitoring precautions for at home were given.  She has a home blood pressure cuff and medications but also follow-up with her PCP to discuss this.  Normal sinus rhythm at time of discharge    Patient has rheumatology follow-up in December and as she has had no prior official diagnosis I discussed holding her plaquenil for now given potential qtc/cardiac interaction and only resuming if she noticed worsening arthritis changes.     Patient discharged in stable condition with close PCP cards and rheum follow up.   Return precautions and follow up discussed and patient voiced agreement and understanding of treatment plan.     DISCHARGE Follow Up Recommendations for labs and diagnostics:   F/u bp control  F/u weight and discuss weight loss     CODE STATUS:  Code Status and Medical Interventions:   Ordered at: 09/02/22 0103     Code Status (Patient has no pulse and is not breathing):    CPR (Attempt to Resuscitate)     Medical Interventions (Patient has pulse or is breathing):    Full Support           Day of Discharge     Vital Signs:  Temp:  [97.3 °F (36.3 °C)-98.2 °F (36.8 °C)] 98.2 °F (36.8 °C)  Heart Rate:  [63-79] 75  Resp:  [15-18] 16  BP: (104-134)/(62-82) 127/82    Physical Exam  Gen: awake, resting in bed, conversant  HENT: NCAT, mmm  Resp: CTAB, normal respiratory effort  CV: RRR, no LE pitting edema  GI: Abdomen soft, NT, ND, no guarding, +BS  Psych: appropriate mood and affect, aox3  Skin: warm, dry        Discharge Details        Discharge Medications      New Medications      Instructions Start Date   acetaminophen 500 MG tablet  Commonly known as: TYLENOL   1,000 mg, Oral, Every 6 Hours PRN      apixaban 5 MG tablet tablet  Commonly known as: ELIQUIS   5 mg, Oral, Every 12 Hours Scheduled         Changes to Medications      Instructions Start Date   meloxicam 15 MG  tablet  Commonly known as: MOBIC  What changed: See the new instructions.   15 mg, Oral, Daily PRN      metoprolol succinate XL 50 MG 24 hr tablet  Commonly known as: TOPROL-XL  What changed:   medication strength  how much to take   50 mg, Oral, Daily         Continue These Medications      Instructions Start Date   famotidine 20 MG tablet  Commonly known as: PEPCID   20 mg, Oral, Daily, daily       fluocinolone acetonide 0.01 % oil otic oil  Commonly known as: DermOtic   Use 5 drops bilateral ears twice daily for 3 weeks.         Stop These Medications    hydroxychloroquine 200 MG tablet  Commonly known as: PLAQUENIL     lisinopril 20 MG tablet  Commonly known as: PRINIVIL,ZESTRIL     triamterene-hydrochlorothiazide 37.5-25 MG per tablet  Commonly known as: MAXZIDE-25              Discharge Disposition:  Home or Self Care    Diet: advance as tolerated     Discharge Activity: advance as tolerated    Future Appointments   Date Time Provider Department Center   9/14/2022 10:30 AM Lisseth Andrews APRN [facility]   5/26/2023 10:45 AM Kassy Salgado APRN [facility]       Additional Instructions for the Follow-ups that You Need to Schedule     Discharge Follow-up with PCP   As directed       Currently Documented PCP:    Laurence Jacob APRN    PCP Phone Number:    [phone]     Follow Up Details: 3-5d hospital f/u bp and afib         Discharge Follow-up with Specified Provider: 1 month - dr emeterio crespo/cardiology afib f/u   As directed      To: 1 month - dr emeterio crespo/cardiology afib f/u               Pertinent  and/or Most Recent Results       LAB RESULTS:      Lab 09/02/22 0419 09/01/22 2252   WBC 9.48 9.07   HEMOGLOBIN 14.4 14.7   HEMATOCRIT 41.5 42.1   PLATELETS 260 254   NEUTROS ABS 6.58 5.58   IMMATURE GRANS (ABS) 0.03 0.03   LYMPHS ABS 1.92 2.29   MONOS ABS 0.73 0.84   EOS ABS 0.17 0.27   MCV 90.2 90.0   D DIMER QUANT  --  0.33         Lab 09/02/22 0419 09/01/22 2252 08/30/22  1559   SODIUM  141 143  --    POTASSIUM 4.0 4.2  --    CHLORIDE 107 107  --    CO2 22.9 27.5  --    ANION GAP 11.1 8.5  --    BUN 11 14  --    CREATININE 0.69 0.83  --    EGFR 105.2 85.5  --    GLUCOSE 129* 160*  --    CALCIUM 8.8 9.1  --    MAGNESIUM 1.7 2.0  --    HEMOGLOBIN A1C  --   --  5.80*   TSH 3.050  --   --          Lab 09/01/22  2252   TOTAL PROTEIN 6.5   ALBUMIN 3.80   GLOBULIN 2.7   ALT (SGPT) 23   AST (SGOT) 20   BILIRUBIN 0.2   ALK PHOS 71         Lab 09/01/22  2252   PROBNP 146.3   TROPONIN T <0.010                 Brief Urine Lab Results     None        Microbiology Results (last 10 days)     ** No results found for the last 240 hours. **          RADIOLOGY:  XR Chest 1 View    Result Date: 9/2/2022  Impression:  No acute infiltrate is appreciated.     COMMENT:  Part of this note is an electronic transcription of spoken language to printed text. The electronic translation/transcription may permit erroneous, or at times, nonsensical (or even sensical) words or phrases to be inadvertently transcribed or omitted; this  has reviewed the note for such errors (as well as additional errors); however, some may still exist.  FRANCISCO MALLOY JR, MD       Electronically Signed and Approved By: FRANCISCO MALLOY JR, MD on 9/02/2022 at 1:55                        Results for orders placed during the hospital encounter of 09/01/22    Adult Transthoracic Echo Complete W/ Cont if Necessary Per Protocol    Interpretation Summary  · Estimated right ventricular systolic pressure from tricuspid regurgitation is normal (<35 mmHg). Calculated right ventricular systolic pressure from tricuspid regurgitation is 26 mmHg.  · Left atrial volume is mildly increased.  · Left ventricular ejection fraction appears to be 51 - 55%.  · Left ventricular diastolic function was indeterminate.  · Left ventricular wall thickness is consistent with mild to moderate concentric hypertrophy.    There were no apparent intracardiac masses, vegetations  or thrombi.      Labs Pending at Discharge:        Time spent on Discharge including face to face service:  42 minutes

## 2022-09-03 NOTE — OUTREACH NOTE
Prep Survey    Flowsheet Row Responses   [facility] patient discharged from? [facility]   Is LACE score < 7 ? Yes   Emergency Room discharge w/ pulse ox? No   Eligibility [facility]   Date of Admission 09/01/22   Date of Discharge 09/03/22   Discharge Disposition Home or Self Care   Discharge diagnosis Atrial fibrillation with RVR   Does the patient have one of the following disease processes/diagnoses(primary or secondary)? Other   Does the patient have Home health ordered? No   Is there a DME ordered? No   Prep survey completed? Yes          FRED THOMPSON - Registered Nurse

## 2022-09-03 NOTE — PROGRESS NOTES
Date of service: 9/3/2022    Subjective: Feels much better.  No shortness of breath, palpitations, chest pain or lightheadedness.     Review of systems: Negative for headaches, tinnitus, vertigo, nausea, vomiting, abdominal pain, dysuria, hematuria, hemoptysis, TIA or CVA-like symptoms.    General examination: She was in no pain or respiratory distress.  HEENT: Sclerae nonicteric.  Neck: No JVD or carotid bruit carotid upstroke was normal.  No thyromegaly  Chest: Clear to auscultations.  No wheeze or rales  Cardiac: RRR.  No S3 gallop.  2/6 systolic murmur over the right and left sternal borders.  No rub or clicks.  Abdomen: Soft, nontender, no megalies or masses.  Bowel sounds present  Extremities: No edema, cyanosis or clubbing.  Pulse intact  Neuro: Alert, oriented, no facial droop and speech was clear.    Records: Reviewed    1.  Atrial fibrillation with RVR, converted into sinus rhythm.  2.  Chronic left bundle branch block  3.  History of palpitations  4.  Continue IV Cardizem drip.  She was already started on beta-blocker  5.  Discussed with him the echocardiography results.  6.  Reviewed with her her medical records that came from her previous cardiologist in [place].  7.  Telemetry, normal sinus rhythm with left 8.  Bundle branch block.  8.  Continue oral anticoagulation; Eliquis 5 mg p.o. twice daily  9.  She may be discharged today and follow-up will be in the office in 1 month.

## 2022-09-03 NOTE — PLAN OF CARE
Goal Outcome Evaluation:  Plan of Care Reviewed With: patient        Progress: improving  Outcome Evaluation: VSS. No complaints overnight. DARREL,RN

## 2022-09-05 ENCOUNTER — TRANSITIONAL CARE MANAGEMENT TELEPHONE ENCOUNTER (OUTPATIENT)
Dept: CALL CENTER | Facility: HOSPITAL | Age: 52
End: 2022-09-05

## 2022-09-05 NOTE — OUTREACH NOTE
Call Center TCM Note    Flowsheet Row Responses   [facility] patient discharged from? [facility]   Does the patient have one of the following disease processes/diagnoses(primary or secondary)? Other   TCM attempt successful? Yes   Call start time 1415   Call end time 1417   Discharge diagnosis Atrial fibrillation with RVR   Meds reviewed with patient/caregiver? Yes   Is the patient having any side effects they believe may be caused by any medication additions or changes? No   Does the patient have all medications ordered at discharge? Yes   Is the patient taking all medications as directed (includes completed medication regime)? Yes   Does the patient have an appointment with their PCP within 7 days of discharge? No   Nursing Interventions PCP office requested to make appointment - message sent, Routed TCM call to PCP office   Comments No available apts with PCP within 2 weeks-pt declined another provider-will route message to group   Psychosocial issues? No   Did the patient receive a copy of their discharge instructions? Yes   Nursing interventions Reviewed instructions with patient   What is the patient's perception of their health status since discharge? Improving   Is the patient/caregiver able to teach back signs and symptoms related to disease process for when to call PCP? Yes   Is the patient/caregiver able to teach back signs and symptoms related to disease process for when to call 911? Yes   Is the patient/caregiver able to teach back the hierarchy of who to call/visit for symptoms/problems? PCP, Specialist, Home health nurse, Urgent Care, ED, 911 Yes   If the patient is a current smoker, are they able to teach back resources for cessation? Not a smoker   TCM call completed? Yes          Hyacinth Fagan RN    9/5/2022, 14:18 EDT

## 2022-09-06 NOTE — PROGRESS NOTES
Progress Note      Patient Name: Paola Sow   Patient ID: [id number]   Sex: Female   YOB: 1970    Primary Care Provider: Laurence PALOMO   Referring Provider: Laurence PALOMO    Visit Date: April 30, 2021    Provider: DEWEY Rashid   Location: [facility]   Location Address: [address] [place] [address], [address] [place] [address]  [id number]   Location Phone: [phone]          Chief Complaint  · Annual physical exam  · Medication refills  · Polyarthralgia      History Of Present Illness  Paola Sow is a 50 year old /White female who presents for evaluation and treatment of:      Patient is a 50-year-old female who comes in for annual physical exam.  She has a history of arthritis, hypertension, and GERD.  She is needing refills on medication and labs checked today.  Blood pressure stable at 124/84.  She denies any headache, chest pain or change in vision.  Patient is up-to-date on her mammogram, her last colonoscopy was in 2017 in [place].  Negative depression screening of one-point.    Patient seen rheumatology in [place] where she previously resided.  She is wanting a referral to rheumatology locally.  She has a family history of autoimmune disorders.  Her YUVAL was negative but they kept an eye on her and continue to work her up periodically to make sure everything remain the same.  She currently takes meloxicam 15 mg, does feel like it has helped with her symptoms.    Patient has a complaint of right wrist pain.  Is been getting worse over the past 2 to 3 weeks.  She is wearing a wrist brace at night and sometimes during the day.  She complains of numbness and tingling worse at nighttime does not wake her up her sleep.  She has had issues with carpal tunnel for 30 years, she has had corticosteroid injections in the wrist and the symptoms did improve greatly.    Patient is complaining of right shoulder pain.  She states is been ongoing for the  I called and mother with pt's mother. She was not able to make it today and wanted to reschedule. I made the appointment for 09/20/22 at 1:00pm.         ----- Message from Ada Leija sent at 9/6/2022  1:44 PM CDT -----  Please cancel pt appt today and call mom Radha @ [phone] reschedule for Thursday of next week.     past 2 to 3 weeks.  She hurt her shoulder in 2018 when she was moving a tarp to wet leaves.  She states she seen a chiropractor at that time she done physical therapy that improved it.  She states no known injury at this time, she is never had it x-rayed shoulder started hurting her over the medial anterior aspect.  Hurts with palpation.  Denies any weakness in her hands, no recent drop of things due to the pain in her shoulder hands.       Past Medical History  Disease Name Date Onset Notes   Anxiety --  --    Arthritis --  --    Asthma --  --    Hemorrhoids --  --    High blood pressure --  --    Reflux Disease --  --          Medication List  Name Date Started Instructions   famotidine 20 mg oral tablet 10/09/2020 take 1 tablet (20 mg) by oral route once daily at bedtime for 90 days   hydroxychloroquine 200 mg oral tablet 10/02/2020 take 2 tablets (400 mg) by oral route once daily for 90 days   lisinopril 20 mg oral tablet 10/09/2020 take 1 tablet (20 mg) by oral route once daily for 90 days   loratadine 10 mg oral tablet 10/02/2020 take 1 tablet (10 mg) by oral route once daily for 90 days   meloxicam 15 mg oral tablet 04/30/2021 take 1 tablet (15 mg) by oral route once daily for 90 days   metoprolol succinate 25 mg oral tablet extended release 24 hr 10/09/2020 take 1 tablet (25 mg) by oral route once daily for 90 days   trazodone 50 mg oral tablet 09/01/2020 take 1 tablet by oral route daily as needed for 90 days   triamterene-hydrochlorothiazid 37.5-25 mg oral tablet 10/09/2020 take 1 tablet by oral route once daily for 90 days   Vitamin D3 25 mcg (1,000 unit) oral capsule 06/03/2020 take 1 capsule by oral route daily for 90 days         Allergy List  Allergen Name Date Reaction Notes   NO KNOWN DRUG ALLERGIES --  --  --        Allergies Reconciled  Family Medical History  Disease Name Relative/Age Notes   Heart Disease Father/  Mother/   --          Social History  Finding Status Start/Stop Quantity Notes  "  Tobacco Never --/-- --  --          Immunizations  NameDate Admin Mfg Trade Name Lot Number Route Inj VIS Given VIS Publication   [id number]/09/2020 [facility] Fluzone [employer]riv[employer]  NE NE 10/09/2020    Comments: Recieved at work         Review of Systems  · Constitutional  o Denies  o : fever, fatigue, weight loss, weight gain  · HENT  o Denies  o : headaches, vertigo, lightheadedness  · Cardiovascular  o Denies  o : lower extremity edema, claudication, chest pressure, palpitations  · Respiratory  o Denies  o : shortness of breath, wheezing, cough, hemoptysis, dyspnea on exertion  · Gastrointestinal  o Denies  o : nausea, vomiting, diarrhea, constipation, abdominal pain  · Integument  o Denies  o : rash, itching, pigmentation changes  · Musculoskeletal  o Admits  o : joint pain, back pain, shoulder pain, wrist pain  o Denies  o : joint swelling, muscle pain, limitation of motion, muscular weakness  · Psychiatric  o Denies  o : anxiety, depression, suicidal ideation, homicidal ideation      Vitals  Date Time BP Position Site L\R Cuff Size HR RR TEMP (F) WT  HT  BMI kg/m2 BSA m2 O2 Sat FR L/min FiO2        04/30/2021 03:52 /84 Sitting    82 - R   334lbs 6oz 5'  7\" 52.37 2.68 100 %            Physical Examination  · Constitutional  o Appearance  o : well-nourished, well developed, in no acute distress  · Eyes  o Conjunctivae  o : conjunctivae normal, no exudates present  o Sclerae  o : sclerae white  o Pupils and Irises  o : pupils equal and round, and reactive to light and accomodation bilaterally  o Eyelids/Ocular Adnexae  o : extra ocular movements intact  · Respiratory  o Respiratory Effort  o : breathing unlabored, no accessory muscle use  o Inspection of Chest  o : normal appearance, no retractions  o Auscultation of Lungs  o : normal breath sounds bilaterally  · Cardiovascular  o Heart  o :   § Auscultation of Heart  § : regular rate and rhythm, no murmurs, gallops or rubs  o Peripheral Vascular System  o : "   § Extremities  § : no edema  · Neurologic  o Mental Status Examination  o :   § Orientation  § : alert and oriented x3  § Speech/Language  § : normal speech pattern  o Gait and Station  o : normal gait, able to stand without difficulty  · Psychiatric  o Judgement and Insight  o : judgment and insight intact, judgement for everyday activities and social situations within normal limits, insight intact  o Thought Processes  o : rate of thoughts normal, thought content logical  o Mood and Affect  o : mood normal, affect appropriate  · Right Shoulder  o Inspection  o : no abnormalities, redness, swelling, scarring or atrophy  o Palpation  o : tenderness to palpation, crepitus present, anterior shoulder tenderness present   o Range of Motion  o : normal range of motion  o Stability  o : shoulder and rotator cuff stability intact              Assessment  · Screening for depression     V79.0/Z13.89  · Screening for colon cancer     V76.51/Z12.11  · Annual physical exam     V70.0/Z00.00  · Essential hypertension     401.9/I10  · GERD (gastroesophageal reflux disease)     530.81/K21.9  · Impaired fasting glucose     790.21/R73.01  · Vitamin D deficiency     268.9/E55.9  · Morbid obesity due to excess calories     278.01/E66.01  · Right shoulder pain     719.41/M25.511  · Right wrist pain     719.43/M25.531      Plan  · Orders  o ACO-18: Negative screen for clinical depression using a standardized tool () - V79.0/Z13.89 - 04/30/2021  o Physical, Primary Care Panel (CBC, CMP, Lipid, TSH) [facility] ([id number], 39288, 66149, 26147) - V70.0/Z00.00 - 04/30/2021  o Hgb A1c [facility] (61424) - 790.21/R73.01 - 04/30/2021  o Vitamin D Level (97992) - 268.9/E55.9 - 04/30/2021  o ACO-39: Current medications updated and reviewed (1159F, ) - - 04/30/2021  o ORTHOPEDIC CONSULTATION (ORTHO) - 719.41/M25.511, 719.43/M25.531 - 04/30/2021  o Xray wrist 3v right [facility] ([id number]) - 719.43/M25.531 - 04/30/2021  o Xray shoulder right [facility]  [facility] ([id number]-VM) - 719.41/M25.511 - 04/30/2021  · Medications  o meloxicam 15 mg oral tablet   SIG: take 1 tablet (15 mg) by oral route once daily for 90 days   DISP: (90) Tablet with 1 refills  Adjusted on 04/30/2021     o Medications have been Reconciled  o Transition of Care or Provider Policy  · Instructions  o Depression Screen completed and scanned into the EMR under the designated folder within the patient's documents.  o Today's PHQ-9 result is _1__  o Reviewed health maintenance flowsheet and updated information. Orders were placed and/or patient's response was documented.  o Patient advised to monitor blood pressure (B/P) at home and journal readings. Patient informed that a B/P reading at home of more than 130/80 is considered hypertension. For readings greater svaj286/90 or higher patient is advised to follow up in the office with readings for management. Patient advised to limit sodium intake.  o Maintain a healthy weight. Avoid tight fitting clothes. Avoid fried, fatty foods, tomato sauce, chocolate, mint, garlic, onion, alcohol. caffeine. Eat smaller meals, dont lie down after a meal, dont smoke. Elevate the head of your bed 6-9 inches.  o Take all medications as prescribed/directed.  o Patient was educated/instructed on their diagnosis, treatment and medications prior to discharge from the clinic today.  o Call the office with any concerns or questions.  · Disposition  o Call or Return if symptoms worsen or persist.  o follow up in 6 months  o follow up as needed  o call the office with any questions or concerns            Electronically Signed by: DEWEY Rashid -Author on April 30, 2021 04:27:27 PM

## 2022-09-12 RX ORDER — LISINOPRIL 20 MG/1
TABLET ORAL
Qty: 90 TABLET | Refills: 0 | Status: SHIPPED | OUTPATIENT
Start: 2022-09-12 | End: 2022-09-19

## 2022-09-12 RX ORDER — METOPROLOL SUCCINATE 25 MG/1
25 TABLET, EXTENDED RELEASE ORAL DAILY
Qty: 90 TABLET | Refills: 0 | Status: SHIPPED | OUTPATIENT
Start: 2022-09-12 | End: 2022-09-19 | Stop reason: DRUGHIGH

## 2022-09-12 NOTE — TELEPHONE ENCOUNTER
Rx Refill Note  Requested Prescriptions     Pending Prescriptions Disp Refills   • lisinopril (PRINIVIL,ZESTRIL) 20 MG tablet [Pharmacy Med Name: LISINOPRIL 20MG TABLETS] 90 tablet 0     Sig: TAKE 1 TABLET(20 MG) BY MOUTH EVERY DAY   • metoprolol succinate XL (TOPROL-XL) 25 MG 24 hr tablet [Pharmacy Med Name: METOPROLOL ER SUCCINATE 25MG TABS] 90 tablet 0     Sig: TAKE 1 TABLET BY MOUTH DAILY      Last office visit with prescribing clinician: 8/5/2022      Next office visit with prescribing clinician: 9/19/2022       Last Relevant Lab Date: 09/2022  Refill Pharmacy: [id number] - [place], [place] - [address] AT [facility] OF [address] & [place] - [phone] [facility] [phone] FX    Lizeth Miner MA  09/12/22, 11:28 EDT

## 2022-09-14 ENCOUNTER — OFFICE VISIT (OUTPATIENT)
Dept: OTOLARYNGOLOGY | Facility: CLINIC | Age: 52
End: 2022-09-14

## 2022-09-14 VITALS — HEIGHT: 67 IN | WEIGHT: 293 LBS | TEMPERATURE: 97.4 F | BODY MASS INDEX: 45.99 KG/M2

## 2022-09-14 DIAGNOSIS — Z86.69 HISTORY OF OTITIS EXTERNA: ICD-10-CM

## 2022-09-14 DIAGNOSIS — H60.543 DERMATITIS OF BOTH EAR CANALS: Primary | ICD-10-CM

## 2022-09-14 DIAGNOSIS — H93.13 TINNITUS OF BOTH EARS: ICD-10-CM

## 2022-09-14 PROCEDURE — 99203 OFFICE O/P NEW LOW 30 MIN: CPT | Performed by: NURSE PRACTITIONER

## 2022-09-14 NOTE — PROGRESS NOTES
Patient Name: Paola Sow   Visit Date: 09/14/2022   Patient ID: [id number]  Provider: DEWEY Leonard    Sex: female  Location: [facility]   YOB: 1970  Location Address: [address] [place] [address], [address] [place] [address],?[place]?[id number]    Primary Care Provider Laurence Jacob APRN  Location Phone: [phone]    Referring Provider: LEYDI Stuart        Chief Complaint  Chronic ear pain and Otitis Media    Subjective   Paola Sow is a 51 y.o. female who presents to [facility] today as a consult from LEYDI Stuart for evaluation of her ears.  Patient states over the past year she has had multiple episodes of dry, flaky skin in her ears, ear itching and swelling of the ear canals.  She states she has been treated for otitis externa infections twice in the last year.  She states she was most recently treated a few months ago.  She has since started taking fluocinolone otic oil drops as needed and does feel like this is helping with the itching and flaky skin on her ears.  She states for the past 5 to 10-year she has had some tinnitus symptoms.  She states over the last year she feels like the tinnitus has become almost constant.  She does feel like she is hearing normally.  She describes the tinnitus as a high-pitched ringing sound that seems to be occurring in both ears.  She denies significant noise exposure history.  She reports that her mother did have a sudden sensorineural hearing loss and her father also has hearing loss.  She has never had her hearing tested.      Current Outpatient Medications on File Prior to Visit   Medication Sig   • acetaminophen (TYLENOL) 500 MG tablet Take 2 tablets by mouth Every 6 (Six) Hours As Needed for Mild Pain (try to use this instead of meloxicam).   • apixaban (ELIQUIS) 5 MG tablet tablet Take 1 tablet by mouth Every 12 (Twelve) Hours for 30 days. Indications: Atrial Fibrillation   •  "famotidine (PEPCID) 20 MG tablet Take 20 mg by mouth Daily. daily   • fluocinolone acetonide (DermOtic) 0.01 % oil otic oil Use 5 drops bilateral ears twice daily for 3 weeks.   • metoprolol succinate XL (TOPROL-XL) 50 MG 24 hr tablet Take 1 tablet by mouth Daily for 30 days.   • lisinopril (PRINIVIL,ZESTRIL) 20 MG tablet TAKE 1 TABLET(20 MG) BY MOUTH EVERY DAY   • meloxicam (MOBIC) 15 MG tablet Take 1 tablet by mouth Daily As Needed for Moderate Pain.   • metoprolol succinate XL (TOPROL-XL) 25 MG 24 hr tablet TAKE 1 TABLET BY MOUTH DAILY     No current facility-administered medications on file prior to visit.        Social History     Tobacco Use   • Smoking status: Never Smoker   • Smokeless tobacco: Never Used   Vaping Use   • Vaping Use: Never used   Substance Use Topics   • Alcohol use: Yes     Alcohol/week: 1.0 standard drink     Types: 1 Glasses of wine per week     Comment: I only drink sporadically.   • Drug use: Never       Objective     Vital Signs:   Temp 97.4 °F (36.3 °C) (Temporal)   Ht 170.2 cm (67\")   Wt (!) 155 kg (342 lb 12.8 oz)   BMI 53.69 kg/m²       Physical Exam  Constitutional:       General: She is not in acute distress.     Appearance: Normal appearance. She is not ill-appearing.   HENT:      Head: Normocephalic and atraumatic.      Jaw: There is normal jaw occlusion. No tenderness or pain on movement.      Salivary Glands: Right salivary gland is not diffusely enlarged or tender. Left salivary gland is not diffusely enlarged or tender.      Right Ear: Tympanic membrane, ear canal and external ear normal.      Left Ear: Tympanic membrane, ear canal and external ear normal.      Ears:      Morrison exam findings: does not lateralize.     Right Rinne: AC > BC.     Left Rinne: AC > BC.     Nose: Nose normal. No septal deviation.      Right Sinus: No maxillary sinus tenderness or frontal sinus tenderness.      Left Sinus: No maxillary sinus tenderness or frontal sinus tenderness.      " Mouth/Throat:      Lips: Pink. No lesions.      Mouth: Mucous membranes are moist. No oral lesions.      Dentition: Normal dentition.      Tongue: No lesions.      Palate: No mass and lesions.      Pharynx: Oropharynx is clear. Uvula midline.      Tonsils: No tonsillar exudate.   Eyes:      Extraocular Movements: Extraocular movements intact.      Conjunctiva/sclera: Conjunctivae normal.      Pupils: Pupils are equal, round, and reactive to light.   Neck:      Thyroid: No thyroid mass, thyromegaly or thyroid tenderness.      Trachea: Trachea normal.   Pulmonary:      Effort: Pulmonary effort is normal. No respiratory distress.   Musculoskeletal:         General: Normal range of motion.      Cervical back: Normal range of motion and neck supple. No tenderness.   Lymphadenopathy:      Cervical: No cervical adenopathy.   Skin:     General: Skin is warm and dry.   Neurological:      General: No focal deficit present.      Mental Status: She is alert and oriented to person, place, and time.      Cranial Nerves: No cranial nerve deficit.      Motor: No weakness.      Gait: Gait normal.   Psychiatric:         Mood and Affect: Mood normal.         Behavior: Behavior normal.         Thought Content: Thought content normal.         Judgment: Judgment normal.               Result Review :              Assessment and Plan    Diagnoses and all orders for this visit:    1. Dermatitis of both ear canals (Primary)    2. History of otitis externa    3. Tinnitus of both ears    On examination today her ear canals look good bilaterally.  There is very little dry skin no signs of inflammation or irritation.  I will have her continue to use the fluocinolone otic oil as she has been as it seems to be working well.  I advised her that if she should experience any more issues with ear swelling or signs of infection I would like to see her back at that time.  Additionally I have given her information on the [facility] to have  an audiogram and tympanogram testing to assess her tinnitus symptoms.  I will see her back as needed.       Follow Up   No follow-ups on file.  Patient was given instructions and counseling regarding her condition or for health maintenance advice. Please see specific information pulled into the AVS if appropriate.      DEWEY Leonard

## 2022-09-19 ENCOUNTER — OFFICE VISIT (OUTPATIENT)
Dept: FAMILY MEDICINE CLINIC | Facility: CLINIC | Age: 52
End: 2022-09-19

## 2022-09-19 VITALS
WEIGHT: 293 LBS | HEART RATE: 71 BPM | HEIGHT: 67 IN | DIASTOLIC BLOOD PRESSURE: 88 MMHG | OXYGEN SATURATION: 100 % | TEMPERATURE: 97.5 F | BODY MASS INDEX: 45.99 KG/M2 | SYSTOLIC BLOOD PRESSURE: 140 MMHG

## 2022-09-19 DIAGNOSIS — R73.09 ELEVATED HEMOGLOBIN A1C: ICD-10-CM

## 2022-09-19 DIAGNOSIS — R79.89 HIGH SERUM VITAMIN D: ICD-10-CM

## 2022-09-19 DIAGNOSIS — E66.01 MORBID OBESITY WITH BMI OF 50.0-59.9, ADULT: ICD-10-CM

## 2022-09-19 DIAGNOSIS — I48.91 NEW ONSET ATRIAL FIBRILLATION: ICD-10-CM

## 2022-09-19 DIAGNOSIS — Z78.9 INPATIENT HOSPITALIZATION WITHIN LAST 30 DAYS: Primary | ICD-10-CM

## 2022-09-19 DIAGNOSIS — Z12.11 SCREEN FOR COLON CANCER: ICD-10-CM

## 2022-09-19 DIAGNOSIS — E55.9 VITAMIN D DEFICIENCY: ICD-10-CM

## 2022-09-19 PROCEDURE — 99214 OFFICE O/P EST MOD 30 MIN: CPT | Performed by: NURSE PRACTITIONER

## 2022-09-19 PROCEDURE — 82306 VITAMIN D 25 HYDROXY: CPT | Performed by: NURSE PRACTITIONER

## 2022-09-19 NOTE — PROGRESS NOTES
Chief Complaint  Hospital Follow Up Visit ([facility] 09/01-03/2022, Dx: Atrial fibrillation with RVR (HCC))    Subjective          Medical History: has a past medical history of Acid reflux disease, Anxiety, Arthritis, Asthma, Callus, Fallen arches, Heart disease, Heart disease, Hemorrhoids, High blood pressure, Hypertension, Ingrown toenail, Plantar warts, Rectal bleeding, Sleep apnea, and Tinnitus.     Surgical History: has a past surgical history that includes Dental surgery; Foot surgery (Bilateral); and Colonoscopy.     Family History: family history includes Arthritis in her mother; Cancer in her father; Hashimoto's thyroiditis in her sister; Hearing loss in her mother; Heart disease in her father and mother; Lupus in her mother; Osteoporosis in her mother; Rashes / Skin problems in her father and mother; Rheum arthritis in her mother.     Social History: reports that she has never smoked. She has never used smokeless tobacco. She reports current alcohol use of about 1.0 standard drink of alcohol per week. She reports that she does not use drugs.    Paola Sow presents to [facility]  History of Present Illness  Paola Sow is a 51 y.o. female admitted to [facility] and  is seen for follow up.  No chief complaint on file.  Admitting provider: Kyle Donnelly  Admission date 9/1/2022  Discharge date 9/3/2022  Discharge summary is available.  Current outpatient and discharge medications have been reconciled for the patient.  Reviewed by: DEWEY Stuart  She was admitted for the following reason(s):  Primary admitting diagnosis at discharge was new onset atrial fib with RVR, chronic left bundle branch block..  Procedures performed while hospitalized:Procedures Performed in Hospital: please see EPIC record for further details of results and finding  Pending results:  Pending tests: none  Pain Control:  well controlled  Diet: Healthy heart diet  Activity after Discharge:  "activity as tolerated  Items to be addressed at first follow up visit include:  1. Medication changes: Started on Eliquis, metoprolol recommend to discontinue Plaquenil, lisinopril, and Maxide    She reports her overall condition is are improving since discharge.  No specific complaints.  Social support is said to be adequate to meet her needs. .     Patient to follow-up with cardiology in 2 weeks.  Blood pressure just slightly elevated today at 140/88 she states its been running normal at home she denies any headache, chest pain or change in vision.  She denies any swelling in her feet and legs.    At last appointment patient vitamin D was elevated.  She states that she has started taking vitamin K with the vitamin D.  She had stopped both and we will recheck levels today.  She states she feels good.      Objective   Vital Signs:   /88 (BP Location: Right arm, Patient Position: Sitting, Cuff Size: Adult)   Pulse 71   Temp 97.5 °F (36.4 °C) (Infrared)   Ht 170.2 cm (67\")   Wt (!) 152 kg (336 lb)   SpO2 100%   BMI 52.63 kg/m²     Physical Exam  Vitals reviewed.   Constitutional:       Appearance: Normal appearance. She is well-developed. She is morbidly obese.   HENT:      Head: Normocephalic and atraumatic.   Eyes:      Conjunctiva/sclera: Conjunctivae normal.      Pupils: Pupils are equal, round, and reactive to light.   Cardiovascular:      Rate and Rhythm: Normal rate and regular rhythm.      Heart sounds: No murmur heard.  Pulmonary:      Effort: Pulmonary effort is normal.      Breath sounds: Normal breath sounds. No wheezing or rhonchi.   Musculoskeletal:      Right lower leg: No edema.      Left lower leg: No edema.   Skin:     General: Skin is warm and dry.   Neurological:      Mental Status: She is alert and oriented to person, place, and time.   Psychiatric:         Mood and Affect: Mood and affect normal.         Behavior: Behavior normal.         Thought Content: Thought content normal.       "   Judgment: Judgment normal.        Result Review :   The following data was reviewed by: DEWEY Stuart on 09/19/2022:  Common labs    Common Labsle 8/30/22 9/1/22 9/1/22 9/2/22 9/2/22     2252 2252 0419 0419   Glucose   160 (A)  129 (A)   BUN   14  11   Creatinine   0.83  0.69   Sodium   143  141   Potassium   4.2  4.0   Chloride   107  107   Calcium   9.1  8.8   Albumin   3.80     Total Bilirubin   0.2     Alkaline Phosphatase   71     AST (SGOT)   20     ALT (SGPT)   23     WBC  9.07  9.48    Hemoglobin  14.7  14.4    Hematocrit  42.1  41.5    Platelets  254  260    Hemoglobin A1C 5.80 (A)       (A) Abnormal value            Data reviewed: Recent hospitalization notes, labs, echo            Current Outpatient Medications on File Prior to Visit   Medication Sig Dispense Refill   • acetaminophen (TYLENOL) 500 MG tablet Take 2 tablets by mouth Every 6 (Six) Hours As Needed for Mild Pain (try to use this instead of meloxicam). 200 tablet 0   • apixaban (ELIQUIS) 5 MG tablet tablet Take 1 tablet by mouth Every 12 (Twelve) Hours for 30 days. Indications: Atrial Fibrillation 60 tablet 0   • famotidine (PEPCID) 20 MG tablet Take 20 mg by mouth Daily. daily     • fluocinolone acetonide (DermOtic) 0.01 % oil otic oil Use 5 drops bilateral ears twice daily for 3 weeks. 20 mL 2   • metoprolol succinate XL (TOPROL-XL) 50 MG 24 hr tablet Take 1 tablet by mouth Daily for 30 days. 30 tablet 0   • [DISCONTINUED] lisinopril (PRINIVIL,ZESTRIL) 20 MG tablet TAKE 1 TABLET(20 MG) BY MOUTH EVERY DAY 90 tablet 0   • [DISCONTINUED] meloxicam (MOBIC) 15 MG tablet Take 1 tablet by mouth Daily As Needed for Moderate Pain. 90 tablet 0   • [DISCONTINUED] metoprolol succinate XL (TOPROL-XL) 25 MG 24 hr tablet TAKE 1 TABLET BY MOUTH DAILY 90 tablet 0     No current facility-administered medications on file prior to visit.        Assessment and Plan    Diagnoses and all orders for this visit:    1. Inpatient hospitalization within  last 30 days (Primary)    2. New onset atrial fibrillation (HCC)  Comments:  Heart rate regular in office today.  She follows up with cardiology in 2 weeks.  We will continue to monitor.    3. Screen for colon cancer  Comments:  Patient is agreeable to go ahead and order Cologuard for colorectal screening.  Orders:  -     Cologuard - Stool, Per Rectum; Future    4. Vitamin D deficiency  Comments:  We will recheck vitamin D levels today.    5. Elevated hemoglobin A1c  Comments:  Future order has been placed to recheck hemoglobin A1c it was elevated at 5.8 in August she started eating better last week we will recheck in December.  Orders:  -     Hemoglobin A1c; Future    6. Morbid obesity with BMI of 50.0-59.9, adult (HCC)    7. High serum vitamin D  -     Vitamin D 25 Hydroxy        Follow Up   Return in about 3 months (around 12/19/2022) for A1c.  Patient was given instructions and counseling regarding her condition or for health maintenance advice. Please see specific information pulled into the AVS if appropriate.

## 2022-09-20 LAB — 25(OH)D3 SERPL-MCNC: 36.5 NG/ML (ref 30–100)

## 2022-10-12 RX ORDER — MELOXICAM 15 MG/1
TABLET ORAL
Qty: 90 TABLET | Refills: 0 | Status: SHIPPED | OUTPATIENT
Start: 2022-10-12

## 2022-10-12 NOTE — TELEPHONE ENCOUNTER
Rx Refill Note  Requested Prescriptions     Pending Prescriptions Disp Refills   • meloxicam (MOBIC) 15 MG tablet [Pharmacy Med Name: MELOXICAM 15MG TABLETS] 90 tablet 0     Sig: TAKE 1 TABLET(15 MG) BY MOUTH EVERY DAY      Last office visit with prescribing clinician: 9/19/2022      Next office visit with prescribing clinician: Visit date not found     The original prescription was discontinued on 9/3/2022 by Kyle Donnelly MD for the following reason: Reorder. Renewing this prescription may not be appropriate     Refill Pharmacy: [id number] - [place], [place] - [address] [facility] [address] [facility] [address] /[facility] & [place] - [phone] [facility] [phone]   [phone]    Lizeth Miner MA  10/12/22, 07:32 EDT

## 2022-12-12 DIAGNOSIS — M25.50 POLYARTHRALGIA: ICD-10-CM

## 2022-12-12 DIAGNOSIS — M32.9 SLE EXACERBATION: Primary | ICD-10-CM

## 2022-12-12 RX ORDER — TRAMADOL HYDROCHLORIDE 50 MG/1
50 TABLET ORAL EVERY 8 HOURS PRN
Qty: 45 TABLET | Refills: 0 | Status: SHIPPED | OUTPATIENT
Start: 2022-12-12

## 2023-01-09 ENCOUNTER — LAB (OUTPATIENT)
Dept: FAMILY MEDICINE CLINIC | Facility: CLINIC | Age: 53
End: 2023-01-09
Payer: COMMERCIAL

## 2023-01-09 DIAGNOSIS — R73.09 ELEVATED HEMOGLOBIN A1C: ICD-10-CM

## 2023-01-09 PROCEDURE — 36415 COLL VENOUS BLD VENIPUNCTURE: CPT | Performed by: NURSE PRACTITIONER

## 2023-01-09 PROCEDURE — 83036 HEMOGLOBIN GLYCOSYLATED A1C: CPT | Performed by: NURSE PRACTITIONER

## 2023-01-10 LAB — HBA1C MFR BLD: 5.7 % (ref 4.8–5.6)

## 2023-04-28 ENCOUNTER — APPOINTMENT (OUTPATIENT)
Dept: GENERAL RADIOLOGY | Facility: HOSPITAL | Age: 53
End: 2023-04-28
Payer: COMMERCIAL

## 2023-04-28 ENCOUNTER — HOSPITAL ENCOUNTER (OUTPATIENT)
Facility: HOSPITAL | Age: 53
Setting detail: OBSERVATION
Discharge: HOME | End: 2023-04-29
Attending: EMERGENCY MEDICINE | Admitting: INTERNAL MEDICINE
Payer: COMMERCIAL

## 2023-04-28 DIAGNOSIS — I48.91 ATRIAL FIBRILLATION WITH RVR: Primary | ICD-10-CM

## 2023-04-28 LAB
ALBUMIN SERPL-MCNC: 3.7 G/DL (ref 3.5–5.2)
ALBUMIN/GLOB SERPL: 1.2 G/DL
ALP SERPL-CCNC: 87 U/L (ref 39–117)
ALT SERPL W P-5'-P-CCNC: 19 U/L (ref 1–33)
ANION GAP SERPL CALCULATED.3IONS-SCNC: 11.7 MMOL/L (ref 5–15)
ANION GAP SERPL CALCULATED.3IONS-SCNC: 12.5 MMOL/L (ref 5–15)
AST SERPL-CCNC: 19 U/L (ref 1–32)
BASOPHILS # BLD AUTO: 0.05 10*3/MM3 (ref 0–0.2)
BASOPHILS # BLD AUTO: 0.05 10*3/MM3 (ref 0–0.2)
BASOPHILS NFR BLD AUTO: 0.5 % (ref 0–1.5)
BASOPHILS NFR BLD AUTO: 0.5 % (ref 0–1.5)
BILIRUB SERPL-MCNC: 0.3 MG/DL (ref 0–1.2)
BUN SERPL-MCNC: 8 MG/DL (ref 6–20)
BUN SERPL-MCNC: 9 MG/DL (ref 6–20)
BUN/CREAT SERPL: 12.7 (ref 7–25)
BUN/CREAT SERPL: 13.6 (ref 7–25)
CALCIUM SPEC-SCNC: 9.3 MG/DL (ref 8.6–10.5)
CALCIUM SPEC-SCNC: 9.3 MG/DL (ref 8.6–10.5)
CHLORIDE SERPL-SCNC: 103 MMOL/L (ref 98–107)
CHLORIDE SERPL-SCNC: 103 MMOL/L (ref 98–107)
CO2 SERPL-SCNC: 21.5 MMOL/L (ref 22–29)
CO2 SERPL-SCNC: 24.3 MMOL/L (ref 22–29)
CREAT SERPL-MCNC: 0.63 MG/DL (ref 0.57–1)
CREAT SERPL-MCNC: 0.66 MG/DL (ref 0.57–1)
DEPRECATED RDW RBC AUTO: 41.7 FL (ref 37–54)
DEPRECATED RDW RBC AUTO: 41.9 FL (ref 37–54)
EGFRCR SERPLBLD CKD-EPI 2021: 105.7 ML/MIN/1.73
EGFRCR SERPLBLD CKD-EPI 2021: 106.9 ML/MIN/1.73
EOSINOPHIL # BLD AUTO: 0.09 10*3/MM3 (ref 0–0.4)
EOSINOPHIL # BLD AUTO: 0.3 10*3/MM3 (ref 0–0.4)
EOSINOPHIL NFR BLD AUTO: 0.9 % (ref 0.3–6.2)
EOSINOPHIL NFR BLD AUTO: 3.3 % (ref 0.3–6.2)
ERYTHROCYTE [DISTWIDTH] IN BLOOD BY AUTOMATED COUNT: 12.6 % (ref 12.3–15.4)
ERYTHROCYTE [DISTWIDTH] IN BLOOD BY AUTOMATED COUNT: 12.8 % (ref 12.3–15.4)
GEN 5 2HR TROPONIN T REFLEX: 13 NG/L
GLOBULIN UR ELPH-MCNC: 3.2 GM/DL
GLUCOSE SERPL-MCNC: 126 MG/DL (ref 65–99)
GLUCOSE SERPL-MCNC: 136 MG/DL (ref 65–99)
HCT VFR BLD AUTO: 44.2 % (ref 34–46.6)
HCT VFR BLD AUTO: 45.6 % (ref 34–46.6)
HGB BLD-MCNC: 15.7 G/DL (ref 12–15.9)
HGB BLD-MCNC: 15.8 G/DL (ref 12–15.9)
HOLD SPECIMEN: NORMAL
HOLD SPECIMEN: NORMAL
IMM GRANULOCYTES # BLD AUTO: 0.04 10*3/MM3 (ref 0–0.05)
IMM GRANULOCYTES # BLD AUTO: 0.04 10*3/MM3 (ref 0–0.05)
IMM GRANULOCYTES NFR BLD AUTO: 0.4 % (ref 0–0.5)
IMM GRANULOCYTES NFR BLD AUTO: 0.4 % (ref 0–0.5)
LYMPHOCYTES # BLD AUTO: 1.64 10*3/MM3 (ref 0.7–3.1)
LYMPHOCYTES # BLD AUTO: 2.07 10*3/MM3 (ref 0.7–3.1)
LYMPHOCYTES NFR BLD AUTO: 15.9 % (ref 19.6–45.3)
LYMPHOCYTES NFR BLD AUTO: 22.6 % (ref 19.6–45.3)
MAGNESIUM SERPL-MCNC: 1.8 MG/DL (ref 1.6–2.6)
MAGNESIUM SERPL-MCNC: 1.8 MG/DL (ref 1.6–2.6)
MCH RBC QN AUTO: 31.3 PG (ref 26.6–33)
MCH RBC QN AUTO: 31.8 PG (ref 26.6–33)
MCHC RBC AUTO-ENTMCNC: 34.6 G/DL (ref 31.5–35.7)
MCHC RBC AUTO-ENTMCNC: 35.5 G/DL (ref 31.5–35.7)
MCV RBC AUTO: 89.5 FL (ref 79–97)
MCV RBC AUTO: 90.5 FL (ref 79–97)
MONOCYTES # BLD AUTO: 0.64 10*3/MM3 (ref 0.1–0.9)
MONOCYTES # BLD AUTO: 0.93 10*3/MM3 (ref 0.1–0.9)
MONOCYTES NFR BLD AUTO: 10.2 % (ref 5–12)
MONOCYTES NFR BLD AUTO: 6.2 % (ref 5–12)
NEUTROPHILS NFR BLD AUTO: 5.76 10*3/MM3 (ref 1.7–7)
NEUTROPHILS NFR BLD AUTO: 63 % (ref 42.7–76)
NEUTROPHILS NFR BLD AUTO: 7.84 10*3/MM3 (ref 1.7–7)
NEUTROPHILS NFR BLD AUTO: 76.1 % (ref 42.7–76)
NRBC BLD AUTO-RTO: 0 /100 WBC (ref 0–0.2)
NRBC BLD AUTO-RTO: 0 /100 WBC (ref 0–0.2)
PLATELET # BLD AUTO: 280 10*3/MM3 (ref 140–450)
PLATELET # BLD AUTO: 305 10*3/MM3 (ref 140–450)
PMV BLD AUTO: 8.4 FL (ref 6–12)
PMV BLD AUTO: 8.7 FL (ref 6–12)
POTASSIUM SERPL-SCNC: 3.8 MMOL/L (ref 3.5–5.2)
POTASSIUM SERPL-SCNC: 4.3 MMOL/L (ref 3.5–5.2)
PROT SERPL-MCNC: 6.9 G/DL (ref 6–8.5)
QT INTERVAL: 358 MS
RBC # BLD AUTO: 4.94 10*6/MM3 (ref 3.77–5.28)
RBC # BLD AUTO: 5.04 10*6/MM3 (ref 3.77–5.28)
SODIUM SERPL-SCNC: 137 MMOL/L (ref 136–145)
SODIUM SERPL-SCNC: 139 MMOL/L (ref 136–145)
T4 FREE SERPL-MCNC: 1.07 NG/DL (ref 0.93–1.7)
TROPONIN T DELTA: -2 NG/L
TROPONIN T SERPL HS-MCNC: 10 NG/L
TROPONIN T SERPL HS-MCNC: 15 NG/L
TSH SERPL DL<=0.05 MIU/L-ACNC: 3.64 UIU/ML (ref 0.27–4.2)
WBC NRBC COR # BLD: 10.3 10*3/MM3 (ref 3.4–10.8)
WBC NRBC COR # BLD: 9.15 10*3/MM3 (ref 3.4–10.8)
WHOLE BLOOD HOLD COAG: NORMAL
WHOLE BLOOD HOLD SPECIMEN: NORMAL

## 2023-04-28 PROCEDURE — 96376 TX/PRO/DX INJ SAME DRUG ADON: CPT

## 2023-04-28 PROCEDURE — 71045 X-RAY EXAM CHEST 1 VIEW: CPT

## 2023-04-28 PROCEDURE — 93005 ELECTROCARDIOGRAM TRACING: CPT

## 2023-04-28 PROCEDURE — 96366 THER/PROPH/DIAG IV INF ADDON: CPT

## 2023-04-28 PROCEDURE — 85025 COMPLETE CBC W/AUTO DIFF WBC: CPT | Performed by: INTERNAL MEDICINE

## 2023-04-28 PROCEDURE — G0378 HOSPITAL OBSERVATION PER HR: HCPCS

## 2023-04-28 PROCEDURE — 84484 ASSAY OF TROPONIN QUANT: CPT | Performed by: INTERNAL MEDICINE

## 2023-04-28 PROCEDURE — 84439 ASSAY OF FREE THYROXINE: CPT | Performed by: INTERNAL MEDICINE

## 2023-04-28 PROCEDURE — 99284 EMERGENCY DEPT VISIT MOD MDM: CPT

## 2023-04-28 PROCEDURE — 83735 ASSAY OF MAGNESIUM: CPT | Performed by: INTERNAL MEDICINE

## 2023-04-28 PROCEDURE — 83735 ASSAY OF MAGNESIUM: CPT

## 2023-04-28 PROCEDURE — 96368 THER/DIAG CONCURRENT INF: CPT

## 2023-04-28 PROCEDURE — 84484 ASSAY OF TROPONIN QUANT: CPT

## 2023-04-28 PROCEDURE — 36415 COLL VENOUS BLD VENIPUNCTURE: CPT | Performed by: INTERNAL MEDICINE

## 2023-04-28 PROCEDURE — 80048 BASIC METABOLIC PNL TOTAL CA: CPT | Performed by: INTERNAL MEDICINE

## 2023-04-28 PROCEDURE — 25010000002 SODIUM CHLORIDE 0.9 % WITH KCL 20 MEQ 20-0.9 MEQ/L-% SOLUTION: Performed by: INTERNAL MEDICINE

## 2023-04-28 PROCEDURE — 25010000002 MAGNESIUM SULFATE 2 GM/50ML SOLUTION: Performed by: INTERNAL MEDICINE

## 2023-04-28 PROCEDURE — 96365 THER/PROPH/DIAG IV INF INIT: CPT

## 2023-04-28 PROCEDURE — 80050 GENERAL HEALTH PANEL: CPT

## 2023-04-28 RX ORDER — SODIUM CHLORIDE AND POTASSIUM CHLORIDE 150; 900 MG/100ML; MG/100ML
125 INJECTION, SOLUTION INTRAVENOUS CONTINUOUS
Status: ACTIVE | OUTPATIENT
Start: 2023-04-28 | End: 2023-04-28

## 2023-04-28 RX ORDER — FAMOTIDINE 20 MG/1
20 TABLET, FILM COATED ORAL DAILY
Status: DISCONTINUED | OUTPATIENT
Start: 2023-04-28 | End: 2023-04-29 | Stop reason: HOSPADM

## 2023-04-28 RX ORDER — ACETAMINOPHEN 325 MG/1
650 TABLET ORAL EVERY 4 HOURS PRN
Status: DISCONTINUED | OUTPATIENT
Start: 2023-04-28 | End: 2023-04-28

## 2023-04-28 RX ORDER — SODIUM CHLORIDE 0.9 % (FLUSH) 0.9 %
10 SYRINGE (ML) INJECTION AS NEEDED
Status: DISCONTINUED | OUTPATIENT
Start: 2023-04-28 | End: 2023-04-28

## 2023-04-28 RX ORDER — MAGNESIUM SULFATE HEPTAHYDRATE 40 MG/ML
2 INJECTION, SOLUTION INTRAVENOUS ONCE
Status: COMPLETED | OUTPATIENT
Start: 2023-04-28 | End: 2023-04-28

## 2023-04-28 RX ORDER — NITROGLYCERIN 0.4 MG/1
0.4 TABLET SUBLINGUAL
Status: DISCONTINUED | OUTPATIENT
Start: 2023-04-28 | End: 2023-04-29 | Stop reason: HOSPADM

## 2023-04-28 RX ORDER — ACETAMINOPHEN 500 MG
1000 TABLET ORAL EVERY 6 HOURS PRN
Status: DISCONTINUED | OUTPATIENT
Start: 2023-04-28 | End: 2023-04-29 | Stop reason: HOSPADM

## 2023-04-28 RX ORDER — DILTIAZEM HCL IN NACL,ISO-OSM 125 MG/125
5-15 PLASTIC BAG, INJECTION (ML) INTRAVENOUS
Status: DISCONTINUED | OUTPATIENT
Start: 2023-04-28 | End: 2023-04-29 | Stop reason: HOSPADM

## 2023-04-28 RX ORDER — SODIUM CHLORIDE 9 MG/ML
40 INJECTION, SOLUTION INTRAVENOUS AS NEEDED
Status: DISCONTINUED | OUTPATIENT
Start: 2023-04-28 | End: 2023-04-29 | Stop reason: HOSPADM

## 2023-04-28 RX ORDER — SODIUM CHLORIDE 0.9 % (FLUSH) 0.9 %
10 SYRINGE (ML) INJECTION EVERY 12 HOURS SCHEDULED
Status: DISCONTINUED | OUTPATIENT
Start: 2023-04-28 | End: 2023-04-29 | Stop reason: HOSPADM

## 2023-04-28 RX ORDER — METOPROLOL SUCCINATE 50 MG/1
50 TABLET, EXTENDED RELEASE ORAL DAILY
Status: DISCONTINUED | OUTPATIENT
Start: 2023-04-28 | End: 2023-04-28

## 2023-04-28 RX ORDER — SODIUM CHLORIDE 0.9 % (FLUSH) 0.9 %
10 SYRINGE (ML) INJECTION AS NEEDED
Status: DISCONTINUED | OUTPATIENT
Start: 2023-04-28 | End: 2023-04-29 | Stop reason: HOSPADM

## 2023-04-28 RX ORDER — METOPROLOL SUCCINATE 50 MG/1
50 TABLET, EXTENDED RELEASE ORAL EVERY 12 HOURS SCHEDULED
Status: DISCONTINUED | OUTPATIENT
Start: 2023-04-28 | End: 2023-04-29 | Stop reason: HOSPADM

## 2023-04-28 RX ADMIN — FAMOTIDINE 20 MG: 20 TABLET ORAL at 09:22

## 2023-04-28 RX ADMIN — MAGNESIUM SULFATE 2 G: 2 INJECTION INTRAVENOUS at 06:15

## 2023-04-28 RX ADMIN — SODIUM CHLORIDE 1000 ML: 9 INJECTION, SOLUTION INTRAVENOUS at 06:01

## 2023-04-28 RX ADMIN — DILTIAZEM HYDROCHLORIDE 5 MG/HR: 5 INJECTION INTRAVENOUS at 03:19

## 2023-04-28 RX ADMIN — METOPROLOL SUCCINATE 50 MG: 50 TABLET, EXTENDED RELEASE ORAL at 09:22

## 2023-04-28 RX ADMIN — DILTIAZEM HYDROCHLORIDE 15 MG/HR: 5 INJECTION INTRAVENOUS at 05:24

## 2023-04-28 RX ADMIN — SODIUM CHLORIDE 500 ML: 9 INJECTION, SOLUTION INTRAVENOUS at 03:14

## 2023-04-28 RX ADMIN — DILTIAZEM HYDROCHLORIDE 15 MG/HR: 5 INJECTION INTRAVENOUS at 17:00

## 2023-04-28 RX ADMIN — DILTIAZEM HYDROCHLORIDE 15 MG/HR: 5 INJECTION INTRAVENOUS at 10:50

## 2023-04-28 RX ADMIN — METOPROLOL SUCCINATE 50 MG: 50 TABLET, EXTENDED RELEASE ORAL at 20:24

## 2023-04-28 RX ADMIN — POTASSIUM CHLORIDE AND SODIUM CHLORIDE 125 ML/HR: 900; 150 INJECTION, SOLUTION INTRAVENOUS at 05:58

## 2023-04-28 RX ADMIN — APIXABAN 5 MG: 5 TABLET, FILM COATED ORAL at 20:24

## 2023-04-28 RX ADMIN — Medication 10 ML: at 09:23

## 2023-04-28 RX ADMIN — APIXABAN 5 MG: 5 TABLET, FILM COATED ORAL at 09:22

## 2023-04-28 RX ADMIN — Medication 10 ML: at 20:24

## 2023-04-28 NOTE — ED PROVIDER NOTES
Time: 2:29 AM EDT  Date of encounter:  4/28/2023  Independent Historian/Clinical History and Information was obtained by:   Patient  Chief Complaint: Irregular Heartbeat    History is limited by: N/A    History of Present Illness:    Patient is a 52 y.o. year old female who presents to the emergency department for evaluation of a irregular heartbeat with sudden acute onset at 0030. Pt reports a history of A. Fib. And notes that they symptoms are similar. Pt admits to SOB, but denies chest pain, nausea or LOC.         History provided by:  Patient   used: No        Patient Care Team  Primary Care Provider: Laurence Jacob APRN    Past Medical History:     No Known Allergies  Past Medical History:   Diagnosis Date   • Acid reflux disease    • Anxiety    • Arthritis    • Asthma    • Callus    • Fallen arches    • Heart disease    • Heart disease    • Hemorrhoids    • High blood pressure    • Hypertension    • Ingrown toenail    • Plantar warts    • Rectal bleeding    • Sleep apnea    • Tinnitus      Past Surgical History:   Procedure Laterality Date   • COLONOSCOPY     • DENTAL PROCEDURE     • FOOT SURGERY Bilateral      Family History   Problem Relation Age of Onset   • Rashes / Skin problems Mother    • Rheum arthritis Mother    • Heart disease Mother    • Osteoporosis Mother    • Arthritis Mother    • Lupus Mother    • Hearing loss Mother    • Rashes / Skin problems Father    • Heart disease Father    • Cancer Father    • Hashimoto's thyroiditis Sister    • Diabetes Neg Hx    • Stroke Neg Hx        Home Medications:  Prior to Admission medications    Medication Sig Start Date End Date Taking? Authorizing Provider   acetaminophen (TYLENOL) 500 MG tablet Take 2 tablets by mouth Every 6 (Six) Hours As Needed for Mild Pain (try to use this instead of meloxicam). 9/3/22   Kyle Donnelly MD   apixaban (ELIQUIS) 5 MG tablet tablet Take 1 tablet by mouth 2 (Two) Times a Day.    Provider,  "MD Jori   famotidine (PEPCID) 20 MG tablet Take 20 mg by mouth Daily. daily    Provider, MD Jori   fluocinolone acetonide (DermOtic) 0.01 % oil otic oil Use 5 drops bilateral ears twice daily for 3 weeks. 8/11/22   Laurence Jacob APRN   meloxicam (MOBIC) 15 MG tablet TAKE 1 TABLET(15 MG) BY MOUTH EVERY DAY 10/12/22   Laurence Jacob APRN   metoprolol succinate XL (TOPROL-XL) 50 MG 24 hr tablet Take 1 tablet by mouth Daily for 30 days. 9/3/22 10/3/22  Kyle Donnelly MD   traMADol (ULTRAM) 50 MG tablet Take 1 tablet by mouth Every 8 (Eight) Hours As Needed for Moderate Pain. 12/12/22   Laurence Jacob APRN        Social History:   Social History     Tobacco Use   • Smoking status: Never   • Smokeless tobacco: Never   Vaping Use   • Vaping Use: Never used   Substance Use Topics   • Alcohol use: Yes     Alcohol/week: 1.0 standard drink     Types: 1 Glasses of wine per week     Comment: I only drink sporadically.   • Drug use: Never         Review of Systems:  Review of Systems   Constitutional: Negative for chills and fever.   HENT: Negative for congestion, rhinorrhea and sore throat.    Eyes: Negative for pain and visual disturbance.   Respiratory: Positive for shortness of breath. Negative for apnea, cough and chest tightness.    Cardiovascular: Negative for chest pain and palpitations.   Gastrointestinal: Negative for abdominal pain, diarrhea, nausea and vomiting.   Genitourinary: Negative for difficulty urinating and dysuria.   Musculoskeletal: Negative for joint swelling and myalgias.   Skin: Negative for color change.   Neurological: Negative for seizures and headaches.   Psychiatric/Behavioral: Negative.    All other systems reviewed and are negative.       Physical Exam:  /82 (BP Location: Left arm, Patient Position: Lying)   Pulse 94   Temp 97.3 °F (36.3 °C) (Oral)   Resp 16   Ht 170.2 cm (67.01\")   Wt (!) 152 kg (334 lb 3.5 oz)   SpO2 96%   BMI 52.33 kg/m² "     Physical Exam  Vitals and nursing note reviewed.   Constitutional:       General: She is not in acute distress.     Appearance: Normal appearance. She is not toxic-appearing.   HENT:      Head: Normocephalic and atraumatic.      Jaw: There is normal jaw occlusion.   Eyes:      General: Lids are normal.      Extraocular Movements: Extraocular movements intact.      Conjunctiva/sclera: Conjunctivae normal.      Pupils: Pupils are equal, round, and reactive to light.   Cardiovascular:      Rate and Rhythm: Tachycardia present. Rhythm irregular.      Pulses: Normal pulses.      Heart sounds: Normal heart sounds.   Pulmonary:      Effort: Pulmonary effort is normal. No respiratory distress.      Breath sounds: Normal breath sounds. No wheezing or rhonchi.   Abdominal:      General: Abdomen is flat.      Palpations: Abdomen is soft.      Tenderness: There is no abdominal tenderness. There is no guarding or rebound.   Musculoskeletal:         General: Normal range of motion.      Cervical back: Normal range of motion and neck supple.      Right lower leg: No edema.      Left lower leg: No edema.   Skin:     General: Skin is warm and dry.   Neurological:      Mental Status: She is alert and oriented to person, place, and time. Mental status is at baseline.   Psychiatric:         Mood and Affect: Mood normal.                  Procedures:  Procedures      Medical Decision Making:      Comorbidities that affect care:    Asthma, Hypertension    External Notes reviewed:    None      The following orders were placed and all results were independently analyzed by me:  Orders Placed This Encounter   Procedures   • XR Chest 1 View   • Daphne Draw   • Comprehensive Metabolic Panel   • Magnesium   • Single High Sensitivity Troponin T   • CBC Auto Differential   • Potassium   • Magnesium   • High Sensitivity Troponin T   • Blood Gas, Arterial -With Co-Ox Panel: Yes   • TSH   • T4, Free   • Basic Metabolic Panel   • CBC Auto  Differential   • Magnesium   • High Sensitivity Troponin T   • High Sensitivity Troponin T 2Hr   • Diet: Regular/House Diet; Texture: Regular Texture (IDDSI 7); Fluid Consistency: Thin (IDDSI 0)   • Undress & Gown   • Continuous Pulse Oximetry   • Vital Signs   • Intake & Output   • Weigh Patient   • Oral Care   • Telemetry - Maintain IV Access   • Telemetry - Pulse Oximetry   • Cardiac Monitoring   • Code Status and Medical Interventions:   • Hospitalist (on-call MD unless specified)   • Inpatient Cardiology Consult   • Oxygen Therapy- Nasal Cannula; Titrate for SPO2: 90% - 95%   • ECG 12 Lead ED Triage Standing Order; Dysrhythmia   • ECG 12 Lead Chest Pain   • Insert Peripheral IV   • Initiate Observation Status   • CBC & Differential   • Green Top (Gel)   • Lavender Top   • Gold Top - SST   • Light Blue Top       Medications Given in the Emergency Department:  Medications   sodium chloride 0.9 % flush 10 mL (has no administration in time range)   dilTIAZem (CARDIZEM) 125 mg in 125 mL sodium chloride  infusion (15 mg/hr Intravenous New Bag 4/28/23 0524)   sodium chloride 0.9 % flush 10 mL (has no administration in time range)   sodium chloride 0.9 % infusion 40 mL (has no administration in time range)   nitroglycerin (NITROSTAT) SL tablet 0.4 mg (has no administration in time range)   sodium chloride 0.9 % bolus 1,000 mL (1,000 mL Intravenous New Bag 4/28/23 0601)   sodium chloride 0.9 % with KCl 20 mEq/L infusion (125 mL/hr Intravenous New Bag 4/28/23 0558)   acetaminophen (TYLENOL) tablet 650 mg (has no administration in time range)   apixaban (ELIQUIS) tablet 5 mg (has no administration in time range)   dilTIAZem (CARDIZEM) bolus from bag 1 mg/mL 25 mg (25 mg Intravenous Bolus from Bag 4/28/23 0317)   sodium chloride 0.9 % bolus 500 mL (500 mL Intravenous New Bag 4/28/23 0314)   magnesium sulfate 2g/50 mL (PREMIX) infusion (2 g Intravenous New Bag 4/28/23 0615)        ED Course:    ED Course as of 04/28/23 3535    Fri Apr 28, 2023 0225 My interpretation of EKG: Atrial fibrillation 129, left bundle branch block, nonspecific ST change, normal QT, rate change from previous [JS]   0412 Patient is treated with diltiazem bolus and infusion in the emergency department working currently to titrate drip to achieve rate control.  Patient discussed with hospitalist for admission.  The patient's airway is intact, vital signs, and respiratory status are safe for admission at this time. [JS]      ED Course User Index  [JS] Giuseppe Mcfarland MD       Labs:    Lab Results (last 24 hours)     Procedure Component Value Units Date/Time    CBC & Differential [[id number]]  (Abnormal) Collected: 04/28/23 0233    Specimen: Blood Updated: 04/28/23 0242    Narrative:      The following orders were created for panel order CBC & Differential.  Procedure                               Abnormality         Status                     ---------                               -----------         ------                     CBC Auto Differential[[id number]]        Abnormal            Final result                 Please view results for these tests on the individual orders.    Comprehensive Metabolic Panel [[id number]]  (Abnormal) Collected: 04/28/23 0233    Specimen: Blood Updated: 04/28/23 0259     Glucose 136 mg/dL      BUN 9 mg/dL      Creatinine 0.66 mg/dL      Sodium 139 mmol/L      Potassium 3.8 mmol/L      Chloride 103 mmol/L      CO2 24.3 mmol/L      Calcium 9.3 mg/dL      Total Protein 6.9 g/dL      Albumin 3.7 g/dL      ALT (SGPT) 19 U/L      AST (SGOT) 19 U/L      Alkaline Phosphatase 87 U/L      Total Bilirubin 0.3 mg/dL      Globulin 3.2 gm/dL      A/G Ratio 1.2 g/dL      BUN/Creatinine Ratio 13.6     Anion Gap 11.7 mmol/L      eGFR 105.7 mL/min/1.73     Narrative:      GFR Normal >60  Chronic Kidney Disease <60  Kidney Failure <15      Magnesium [[id number]]  (Normal) Collected: 04/28/23 0233    Specimen: Blood Updated: 04/28/23 0259     Magnesium 1.8  mg/dL     Single High Sensitivity Troponin T [[id number]]  (Abnormal) Collected: 04/28/23 0233    Specimen: Blood Updated: 04/28/23 0259     HS Troponin T 10 ng/L     Narrative:      High Sensitive Troponin T Reference Range:  <10.0 ng/L- Negative Female for AMI  <15.0 ng/L- Negative Male for AMI  >=10 - Abnormal Female indicating possible myocardial injury.  >=15 - Abnormal Male indicating possible myocardial injury.   Clinicians would have to utilize clinical acumen, EKG, Troponin, and serial changes to determine if it is an Acute Myocardial Infarction or myocardial injury due to an underlying chronic condition.         CBC Auto Differential [[id number]]  (Abnormal) Collected: 04/28/23 0233    Specimen: Blood Updated: 04/28/23 0242     WBC 9.15 10*3/mm3      RBC 4.94 10*6/mm3      Hemoglobin 15.7 g/dL      Hematocrit 44.2 %      MCV 89.5 fL      MCH 31.8 pg      MCHC 35.5 g/dL      RDW 12.8 %      RDW-SD 41.9 fl      MPV 8.7 fL      Platelets 280 10*3/mm3      Neutrophil % 63.0 %      Lymphocyte % 22.6 %      Monocyte % 10.2 %      Eosinophil % 3.3 %      Basophil % 0.5 %      Immature Grans % 0.4 %      Neutrophils, Absolute 5.76 10*3/mm3      Lymphocytes, Absolute 2.07 10*3/mm3      Monocytes, Absolute 0.93 10*3/mm3      Eosinophils, Absolute 0.30 10*3/mm3      Basophils, Absolute 0.05 10*3/mm3      Immature Grans, Absolute 0.04 10*3/mm3      nRBC 0.0 /100 WBC     TSH [[id number]]  (Normal) Collected: 04/28/23 0233    Specimen: Blood Updated: 04/28/23 0452     TSH 3.640 uIU/mL     T4, Free [[id number]]  (Normal) Collected: 04/28/23 0233    Specimen: Blood Updated: 04/28/23 0452     Free T4 1.07 ng/dL     Narrative:      Results may be falsely increased if patient taking Biotin.      Basic Metabolic Panel [[id number]]  (Abnormal) Collected: 04/28/23 0542    Specimen: Blood Updated: 04/28/23 0652     Glucose 126 mg/dL      BUN 8 mg/dL      Creatinine 0.63 mg/dL      Sodium 137 mmol/L      Potassium 4.3 mmol/L       Comment: Slight hemolysis detected by analyzer. Results may be affected.        Chloride 103 mmol/L      CO2 21.5 mmol/L      Calcium 9.3 mg/dL      BUN/Creatinine Ratio 12.7     Anion Gap 12.5 mmol/L      eGFR 106.9 mL/min/1.73     Narrative:      GFR Normal >60  Chronic Kidney Disease <60  Kidney Failure <15      CBC Auto Differential [[id number]]  (Abnormal) Collected: 04/28/23 0542    Specimen: Blood Updated: 04/28/23 0601     WBC 10.30 10*3/mm3      RBC 5.04 10*6/mm3      Hemoglobin 15.8 g/dL      Hematocrit 45.6 %      MCV 90.5 fL      MCH 31.3 pg      MCHC 34.6 g/dL      RDW 12.6 %      RDW-SD 41.7 fl      MPV 8.4 fL      Platelets 305 10*3/mm3      Neutrophil % 76.1 %      Lymphocyte % 15.9 %      Monocyte % 6.2 %      Eosinophil % 0.9 %      Basophil % 0.5 %      Immature Grans % 0.4 %      Neutrophils, Absolute 7.84 10*3/mm3      Lymphocytes, Absolute 1.64 10*3/mm3      Monocytes, Absolute 0.64 10*3/mm3      Eosinophils, Absolute 0.09 10*3/mm3      Basophils, Absolute 0.05 10*3/mm3      Immature Grans, Absolute 0.04 10*3/mm3      nRBC 0.0 /100 WBC     Magnesium [[id number]]  (Normal) Collected: 04/28/23 0542    Specimen: Blood Updated: 04/28/23 0652     Magnesium 1.8 mg/dL     High Sensitivity Troponin T [[id number]]  (Abnormal) Collected: 04/28/23 0542    Specimen: Blood Updated: 04/28/23 0652     HS Troponin T 15 ng/L     Narrative:      High Sensitive Troponin T Reference Range:  <10.0 ng/L- Negative Female for AMI  <15.0 ng/L- Negative Male for AMI  >=10 - Abnormal Female indicating possible myocardial injury.  >=15 - Abnormal Male indicating possible myocardial injury.   Clinicians would have to utilize clinical acumen, EKG, Troponin, and serial changes to determine if it is an Acute Myocardial Infarction or myocardial injury due to an underlying chronic condition.                Imaging:    XR Chest 1 View    Result Date: 4/28/2023  PROCEDURE: XR CHEST 1 VW  COMPARISON: 9/1/2022.   INDICATIONS: Dysrhythmia.  FINDINGS:  A single AP (or PA) upright portable chest radiograph was performed.  Borderline cardiac enlargement is seen.  No acute infiltrate is appreciated.  No pleural effusion or pneumothorax is identified. The thoracic aorta is atherosclerotic and mildly ectatic.  Chronic calcified granulomatous disease involves the chest.  External artifacts obscure detail.  There are degenerative changes of the bilateral shoulders.  Interval improvement in lung expansion is noted.  Otherwise, no significant interval change is seen since the prior study (or studies).        No acute infiltrate is appreciated.     Please note that portions of this note were completed with a voice recognition program.  FRANCISCO MALLOY JR, MD       Electronically Signed and Approved By: FRANCISCO MALLOY JR, MD on 4/28/2023 at 2:41                  Differential Diagnosis and Discussion:    Irregular Heartbeat    All labs were reviewed and interpreted by me.  All X-rays impressions were independently interpreted by me.  EKG was interpreted by me.    [facility]     Critical Care Note: Total Critical Care time of 35 minutes. Total critical care time documented does not include time spent on separately billed procedures for services of nurses or physician assistants. I personally saw and examined the patient. I have reviewed all diagnostic interpretations and treatment plans as written. I was present for the key portions of any procedures performed and the inclusive time noted in any critical care statement. Critical care time includes patient management by me, time spent at the patients bedside,  time to review lab and imaging results, discussing patient care, documentation in the medical record, and time spent with family or caregiver.    Patient Care Considerations:    CONSULT: I considered consulting Cardiology, however Patient is rate controlled with diltiazem      Consultants/Shared Management Plan:    Hospitalist: I have  discussed the case with The hospitalist who agrees to accept the patient for admission.    Social Determinants of Health:    Patient is independent, reliable, and has access to care.       Disposition and Care Coordination:    Admit:   Through independent evaluation of the patient's history, physical, and imperical data, the patient meets criteria for observation/admission to the hospital.        Final diagnoses:   Atrial fibrillation with RVR        ED Disposition     ED Disposition   Decision to Admit    Condition   --    Comment   Level of Care: Telemetry [5]   Diagnosis: Atrial fibrillation with RVR [041053]   Admitting Physician: ROBERT ESTEVEZ [[id number]]   Attending Physician: ROBERT ESTEVEZ [[id number]]               This medical record created using voice recognition software.        Documentation assistance provided by Jose Enrique Ramirez acting as scribe for Giuseppe Mcfarland MD. Information recorded by the scribe was done at my direction and has been verified and validated by me.        Jose Enrique Ramirez  04/28/23 0307       Giuseppe Mcfarland MD  04/28/23 4498

## 2023-04-28 NOTE — PROGRESS NOTES
[facility]   Hospitalist Progress Note  Date: 2023  Patient Name: Paola Sow  : 1970  MRN: [id number]  Date of admission: 2023      Subjective   Subjective     Chief Complaint: Palpitation    Summary:   52-year-old morbidly obese woman with HTN, HLD, and atrial fibrillation (diagnosed last September) presents to ED with rapid heart rate.  She was in her usual state of health prior to being woken from her sleep tonight by the sensation of the palpitations in her chest.  The symptoms are similar to when she first found out she had atrial fibrillation.  She denies any associated symptoms other than some lightheadedness.  She denies fevers, chills, nausea, vomiting, chest pain, abdominal pain, anxiety, pain with urination, change in bowel habits.     Dr. Frank Boykin is the patient's cardiologist.    Interval Followup:   -- Patient was seen this morning and she was still in A-fib with RVR on 15 a Dilt  -- She had not yet received her metoprolol so much that was restarted back  -- Waiting on cardiology to see    Review of Systems   All systems were reviewed    Objective   Objective     Vitals:   Temp:  [97.3 °F (36.3 °C)-99 °F (37.2 °C)] 97.3 °F (36.3 °C)  Heart Rate:  [] 94  Resp:  [14-16] 16  BP: (104-144)/() 121/82  Physical Exam    Constitutional: Awake, alert, no acute distress   Eyes: Pupils equal, sclerae anicteric, no conjunctival injection   HENT: NCAT, mucous membranes moist   Neck: Supple, no thyromegaly, no lymphadenopathy, trachea midline   Respiratory: Clear to auscultation bilaterally, nonlabored respirations    Cardiovascular: Irregularly irregular, no murmurs, rubs, or gallops, palpable pedal pulses bilaterally   Gastrointestinal: Positive bowel sounds, soft, nontender, nondistended   Musculoskeletal: No bilateral ankle edema, no clubbing or cyanosis to extremities   Psychiatric: Appropriate affect, cooperative   Neurologic: Oriented x 3, strength symmetric in all  extremities, Cranial Nerves grossly intact to confrontation, speech clear   Skin: No rashes     Result Review    Result Review:  I have personally reviewed the results from the time of this admission to 4/28/2023 09:51 EDT and agree with these findings:  []  Laboratory  []  Microbiology  []  Radiology  []  EKG/Telemetry   []  Cardiology/Vascular   []  Pathology  []  Old records  []  Other:    Assessment & Plan   Assessment / Plan     Assessment/Plan:  Atrial fibrillation with RVR  Hypertension  Dyslipidemia    Plan:  -- continue diltiazem   -- Consult cardiology  -- Add back metoprolol 50 milligrams this may need to be uptitrate  --tsh is normal     Discussed plan with RN.    DVT prophylaxis:  Eliquis    CODE STATUS:   Code Status (Patient has no pulse and is not breathing): CPR (Attempt to Resuscitate)  Medical Interventions (Patient has pulse or is breathing): Full Support        Electronically signed by Kelton Torres MD, 04/28/23, 9:51 AM EDT.

## 2023-04-28 NOTE — ED NOTES
Attempted to call report and was told patient had not been assigned to a nurse yet and I would need to call back in 15 minutes.

## 2023-04-28 NOTE — CASE MANAGEMENT/SOCIAL WORK
Discharge Planning Assessment   [facility]     Patient Name: Paola Sow  MRN: [id number]  Today's Date: 4/28/2023    Admit Date: 4/28/2023    Plan: Patient admitted due to afibb. RNCM met with patient at the bedside, verified the PCP and pharmacy. Patient is independent at home with ADLs, with spouse. Patient plans to return home at discharge, family will provide transportation home.   Discharge Needs Assessment     Row Name 04/28/23 1339       Living Environment    People in Home spouse    Current Living Arrangements home    Primary Care Provided by self    Family Caregiver if Needed spouse    Quality of Family Relationships helpful;involved;supportive    Able to Return to Prior Arrangements yes       Resource/Environmental Concerns    Resource/Environmental Concerns none    Transportation Concerns none       Transition Planning    Patient/Family Anticipates Transition to home with family    Patient/Family Anticipated Services at Transition none    Transportation Anticipated family or friend will provide       Discharge Needs Assessment    Readmission Within the Last 30 Days no previous admission in last 30 days    Equipment Currently Used at Home cpap  [facility]    Concerns to be Addressed discharge planning    Anticipated Changes Related to Illness none    Equipment Needed After Discharge none               Discharge Plan     Row Name 04/28/23 8690       Plan    Plan Patient admitted due to afibb. RNCM met with patient at the bedside, verified the PCP and pharmacy. Patient is independent at home with ADLs, with spouse. Patient plans to return home at discharge, family will provide transportation home.    Final Discharge Disposition Code 01 - home or self-care              Continued Care and Services - Admitted Since 4/28/2023    Coordination has not been started for this encounter.          Demographic Summary     Row Name 04/28/23 2213       General Information    Admission Type observation    Arrived From  home;emergency department    Referral Source admission list    Reason for Consult discharge planning    Preferred Language English       Contact Information    Permission Granted to Share Info With family/designee    Contact Information Obtained for                Functional Status     Row Name 04/28/23 1318       Functional Status    Usual Activity Tolerance good    Current Activity Tolerance good       Assessment of Health Literacy    How often do you have someone help you read hospital materials? Occasionally    How often do you have problems learning about your medical condition because of difficulty understanding written information? Never    How often do you have a problem understanding what is told to you about your medical condition? Never    How confident are you filling out medical forms by yourself? Quite a bit    Health Literacy Good       Functional Status, IADL    Medications independent    Meal Preparation independent    Housekeeping independent    Laundry independent    Shopping independent       Mental Status    General Appearance WDL WDL       Mental Status Summary    Recent Changes in Mental Status/Cognitive Functioning no changes       Employment/    Employment Status employed full-time    Shift Worked first shift    Current or Previous Occupation ;service industry               Psychosocial    No documentation.                Abuse/Neglect    No documentation.                Legal    No documentation.                Substance Abuse    No documentation.                Patient Forms    No documentation.                   Meenu Roman RN

## 2023-04-28 NOTE — CONSULTS
"Date of service: 4/28/2023    Reason for consultation: Atrial fibrillation with RVR    HPI: A 52-year-old female with a history of atrial fibrillation was admitted for palpitations that were described as \" fast heartbeats\".  They were associated with SOB but no chest pain, dizziness, syncope or near syncope.  She was found to be in atrial fibrillation with RVR.  In the ER, she was given the Cardizem 25 mg IV bolus and was started on IV Cardizem drip.  At this time, she has no chest pain, dyspnea, orthopnea palpitations, or syncope.  She does not have any history of anemia or hyperthyroidism.  No excessive caffeine or alcohol intake.    Review of systems: Negative for headaches, tinnitus, vertigo, nausea, vomiting, abdominal pain, dysuria, hematuria, hemoptysis, TIA or CVA-like symptoms     Past medical and surgical history:     1.  Hypertension  2.  Situational anxiety/panic attack many years ago.  3.  Normal echocardiography and stress test in 2018 when she had palpitations.  She saw a cardiologist in [place].  4.  Arthritis  5.  GERD  6.  Surgeries and procedures: Colonoscopy, dental work and foot surgery     Allergies: NKDA     Social history: Never smoked or used illicit drugs.  Very rarely drinks alcohol.       Family history: Mother had MI in her 50s.     General examination: She was in no pain or respiratory distress.  HEENT: Sclerae nonicteric.  Neck: No JVD or carotid bruit.  Carotid upstroke was normal.  No thyromegaly  Chest: CTA. No wheeze or rales  Cardiac: Irregularly irregular.  No S3 gallop.  2/6 systolic murmur over the right and left sternal borders.  No rub or clicks.  Abdomen: Soft, nontender, no megalies or masses.  Bowel sounds present  Extremities: No edema, cyanosis or clubbing.  Pulse intact  Neuro: Alert, oriented x3, no facial droop and speech was clear.     Records: Reviewed.     1.  Atrial fibrillation with RVR  2.  Chronic left bundle branch block  3.  History of " palpitations  4.  Continue IV Cardizem drip and titrate.  5.  Change metoprolol succinate to 50 mg p.o. twice daily   6.  There will be no need to repeat an echo as she already had an echocardiography in 9/2022  7.  Any further management will be based on her clinical course.

## 2023-04-28 NOTE — SIGNIFICANT NOTE
04/28/23 1015   Coping/Psychosocial   Observed Emotional State calm;cooperative   Verbalized Emotional State relief;hopefulness   Trust Relationship/Rapport empathic listening provided   Involvement in Care interacting with patient   Additional Documentation Spiritual Care (Group)   Spiritual Care   Use of Spiritual Resources non-Adventism use of spiritual care   Spiritual Care Source  initiative   Spiritual Care Follow-Up follow-up, none required as presently assessed   Response to Spiritual Care receptive of support;thanks expressed   Spiritual Care Interventions supportive conversation provided   Spiritual Care Visit Type initial   Receptivity to Spiritual Care visit welcomed

## 2023-04-28 NOTE — PLAN OF CARE
Goal Outcome Evaluation:  Plan of Care Reviewed With: patient   Arrived to floor with cardizem drip running at 12.5, increased to 15 per order. Patient has call light in reach, will call before getting up. BP Q15 with bedside monitor.      Progress: no change

## 2023-04-28 NOTE — H&P
[facility]   HISTORY AND PHYSICAL    Patient Name: Paola Sow  : 1970  MRN: [id number]  Primary Care Physician: Laurence Jacob APRN  Date of admission: 2023    Subjective   Subjective     Chief Complaint: Palpitations    History of Present Illness  52-year-old morbidly obese woman with HTN, HLD, and atrial fibrillation (diagnosed last September) presents to ED with rapid heart rate.  She was in her usual state of health prior to being woken from her sleep tonight by the sensation of the palpitations in her chest.  The symptoms are similar to when she first found out she had atrial fibrillation.  She denies any associated symptoms other than some lightheadedness.  She denies fevers, chills, nausea, vomiting, chest pain, abdominal pain, anxiety, pain with urination, change in bowel habits.    Dr. Frank Boykin is the patient's cardiologist.    In the ED:  Vitals largely normal except for notable tachycardia    Notable labs:  Troponin 10    TSH/T4: Within normal limits    EKG:  Atrial fibrillation  LBBB (old)    CXR: No acute process    Echocardiogram from 2022:  • Estimated right ventricular systolic pressure from tricuspid regurgitation is normal (<35 mmHg). Calculated right ventricular systolic pressure from tricuspid regurgitation is 26 mmHg.  • Left atrial volume is mildly increased.  • Left ventricular ejection fraction appears to be 51 - 55%.  • Left ventricular diastolic function was indeterminate.  • Left ventricular wall thickness is consistent with mild to moderate concentric hypertrophy.     ED started the patient on diltiazem drip with relatively good effect.    Review of Systems   Constitutional: Negative for chills and fever.   HENT: Negative for congestion, rhinorrhea and sore throat.    Eyes: Negative for pain and visual disturbance.   Respiratory: Negative for apnea, cough and chest tightness.    Cardiovascular: Positive for palpitations.  Negative for chest  pain.  Gastrointestinal: Negative for abdominal pain, diarrhea, nausea and vomiting.   Genitourinary: Negative for difficulty urinating and dysuria.   Musculoskeletal: Negative for joint swelling and myalgias.   Skin: Negative for color change.   Neurological: Positive for lightheadedness.  Negative for seizures and headaches.   Psychiatric/Behavioral: Negative.    All other systems reviewed and are negative.       Personal History     Past Medical History:   Diagnosis Date   • Acid reflux disease    • Anxiety    • Arthritis    • Asthma    • Callus    • Fallen arches    • Heart disease    • Heart disease    • Hemorrhoids    • High blood pressure    • Hypertension    • Ingrown toenail    • Plantar warts    • Rectal bleeding    • Sleep apnea    • Tinnitus        Past Surgical History:   Procedure Laterality Date   • COLONOSCOPY     • DENTAL PROCEDURE     • FOOT SURGERY Bilateral        Family History: family history includes Arthritis in her mother; Cancer in her father; Hashimoto's thyroiditis in her sister; Hearing loss in her mother; Heart disease in her father and mother; Lupus in her mother; Osteoporosis in her mother; Rashes / Skin problems in her father and mother; Rheum arthritis in her mother. Otherwise pertinent FHx was reviewed and not pertinent to current issue.    Social History:  reports that she has never smoked. She has never used smokeless tobacco. She reports current alcohol use of about 1.0 standard drink per week. She reports that she does not use drugs.    Home Medications:  acetaminophen, apixaban, famotidine, fluocinolone acetonide, metoprolol succinate XL, and traMADol      Allergies:  No Known Allergies    Objective    Objective     Vitals:  Temp:  [98.2 °F (36.8 °C)-99 °F (37.2 °C)] 99 °F (37.2 °C)  Heart Rate:  [107-132] 109  Resp:  [14-16] 16  BP: (104-144)/() 144/84    Physical Exam  Constitutional:   Morbidly obese     General: She is not in acute distress.     Appearance: Normal  appearance. She is not toxic-appearing.   HENT:      Head: Normocephalic and atraumatic.   Eyes:      General: Lids are normal.      Extraocular Movements: Extraocular movements intact.      Conjunctiva/sclera: Conjunctivae normal.      Pupils: Pupils are equal, round, and reactive to light.   Cardiovascular:      Rate and Rhythm: Tachycardia present. Rhythm irregular.      Pulses: Normal pulses.      Heart sounds: Normal heart sounds.   Pulmonary:      Effort: Pulmonary effort is normal. No respiratory distress.      Breath sounds: Normal breath sounds. No wheezing or rhonchi.   Abdominal: Morbidly obese     General: Abdomen is flat.      Palpations: Abdomen is soft.      Tenderness: There is no abdominal tenderness. There is no guarding or rebound.   Musculoskeletal:         General: Normal range of motion.      Cervical back: Normal range of motion and neck supple.      Right lower leg: No edema.      Left lower leg: No edema.   Skin:     General: Skin is warm and dry.   Neurological:      Mental Status: She is alert and oriented to person, place, and time. Mental status is at baseline.   Psychiatric:         Mood and Affect: Mood normal.      Result Review    Result Review:  I have personally reviewed the results from the time of this admission to 4/28/2023 05:56 EDT and agree with these findings:  [x]  Laboratory list / accordion  [x]  Microbiology  [x]  Radiology  [x]  EKG/Telemetry   []  Cardiology/Vascular   []  Pathology  []  Old records  []  Other:  Most notable findings include: A-fib with RVR; otherwise largely unremarkable        Assessment & Plan   Assessment / Plan     Brief Patient Summary:  52-year-old morbidly obese woman with HTN, HLD, and atrial fibrillation (diagnosed last September) presents to ED with rapid heart rate.     Active Hospital Problems:  Active Hospital Problems    Diagnosis    • **Atrial fibrillation with RVR        Plan:   A-fib with RVR  -No obvious precipitating  factors  -Continue diltiazem drip  -Continue home Eliquis  -Continue home Toprol  [] Follow-up with Dr. Frank Boykin    DVT prophylaxis: Home Eliquis    CODE STATUS: Full code    Admission Status:  I believe this patient meets observation status.    Nitin Huerta MD,

## 2023-04-29 ENCOUNTER — READMISSION MANAGEMENT (OUTPATIENT)
Dept: CALL CENTER | Facility: HOSPITAL | Age: 53
End: 2023-04-29
Payer: COMMERCIAL

## 2023-04-29 VITALS
HEART RATE: 71 BPM | SYSTOLIC BLOOD PRESSURE: 116 MMHG | TEMPERATURE: 98.2 F | RESPIRATION RATE: 16 BRPM | BODY MASS INDEX: 45.99 KG/M2 | WEIGHT: 293 LBS | HEIGHT: 67 IN | DIASTOLIC BLOOD PRESSURE: 57 MMHG | OXYGEN SATURATION: 97 %

## 2023-04-29 LAB
ALBUMIN SERPL-MCNC: 3.2 G/DL (ref 3.5–5.2)
ALBUMIN/GLOB SERPL: 1.2 G/DL
ALP SERPL-CCNC: 73 U/L (ref 39–117)
ALT SERPL W P-5'-P-CCNC: 15 U/L (ref 1–33)
ANION GAP SERPL CALCULATED.3IONS-SCNC: 11.7 MMOL/L (ref 5–15)
AST SERPL-CCNC: 14 U/L (ref 1–32)
BASOPHILS # BLD AUTO: 0.05 10*3/MM3 (ref 0–0.2)
BASOPHILS NFR BLD AUTO: 0.5 % (ref 0–1.5)
BILIRUB SERPL-MCNC: 0.4 MG/DL (ref 0–1.2)
BUN SERPL-MCNC: 10 MG/DL (ref 6–20)
BUN/CREAT SERPL: 15.6 (ref 7–25)
CALCIUM SPEC-SCNC: 8.4 MG/DL (ref 8.6–10.5)
CHLORIDE SERPL-SCNC: 105 MMOL/L (ref 98–107)
CO2 SERPL-SCNC: 20.3 MMOL/L (ref 22–29)
CREAT SERPL-MCNC: 0.64 MG/DL (ref 0.57–1)
DEPRECATED RDW RBC AUTO: 44.6 FL (ref 37–54)
EGFRCR SERPLBLD CKD-EPI 2021: 106.5 ML/MIN/1.73
EOSINOPHIL # BLD AUTO: 0.32 10*3/MM3 (ref 0–0.4)
EOSINOPHIL NFR BLD AUTO: 3.1 % (ref 0.3–6.2)
ERYTHROCYTE [DISTWIDTH] IN BLOOD BY AUTOMATED COUNT: 13.2 % (ref 12.3–15.4)
GLOBULIN UR ELPH-MCNC: 2.7 GM/DL
GLUCOSE SERPL-MCNC: 94 MG/DL (ref 65–99)
HCT VFR BLD AUTO: 42 % (ref 34–46.6)
HGB BLD-MCNC: 14.3 G/DL (ref 12–15.9)
IMM GRANULOCYTES # BLD AUTO: 0.05 10*3/MM3 (ref 0–0.05)
IMM GRANULOCYTES NFR BLD AUTO: 0.5 % (ref 0–0.5)
LYMPHOCYTES # BLD AUTO: 2.5 10*3/MM3 (ref 0.7–3.1)
LYMPHOCYTES NFR BLD AUTO: 24.4 % (ref 19.6–45.3)
MAGNESIUM SERPL-MCNC: 1.8 MG/DL (ref 1.6–2.6)
MCH RBC QN AUTO: 31.5 PG (ref 26.6–33)
MCHC RBC AUTO-ENTMCNC: 34 G/DL (ref 31.5–35.7)
MCV RBC AUTO: 92.5 FL (ref 79–97)
MONOCYTES # BLD AUTO: 0.9 10*3/MM3 (ref 0.1–0.9)
MONOCYTES NFR BLD AUTO: 8.8 % (ref 5–12)
NEUTROPHILS NFR BLD AUTO: 6.44 10*3/MM3 (ref 1.7–7)
NEUTROPHILS NFR BLD AUTO: 62.7 % (ref 42.7–76)
NRBC BLD AUTO-RTO: 0 /100 WBC (ref 0–0.2)
PLATELET # BLD AUTO: 276 10*3/MM3 (ref 140–450)
PMV BLD AUTO: 8.5 FL (ref 6–12)
POTASSIUM SERPL-SCNC: 3.6 MMOL/L (ref 3.5–5.2)
PROT SERPL-MCNC: 5.9 G/DL (ref 6–8.5)
RBC # BLD AUTO: 4.54 10*6/MM3 (ref 3.77–5.28)
SODIUM SERPL-SCNC: 137 MMOL/L (ref 136–145)
WBC NRBC COR # BLD: 10.26 10*3/MM3 (ref 3.4–10.8)

## 2023-04-29 PROCEDURE — 96366 THER/PROPH/DIAG IV INF ADDON: CPT

## 2023-04-29 PROCEDURE — G0378 HOSPITAL OBSERVATION PER HR: HCPCS

## 2023-04-29 PROCEDURE — 83735 ASSAY OF MAGNESIUM: CPT | Performed by: INTERNAL MEDICINE

## 2023-04-29 PROCEDURE — 85025 COMPLETE CBC W/AUTO DIFF WBC: CPT | Performed by: INTERNAL MEDICINE

## 2023-04-29 PROCEDURE — 80053 COMPREHEN METABOLIC PANEL: CPT | Performed by: INTERNAL MEDICINE

## 2023-04-29 RX ORDER — POTASSIUM CHLORIDE 750 MG/1
40 CAPSULE, EXTENDED RELEASE ORAL
Status: COMPLETED | OUTPATIENT
Start: 2023-04-29 | End: 2023-04-29

## 2023-04-29 RX ORDER — METOPROLOL SUCCINATE 50 MG/1
50 TABLET, EXTENDED RELEASE ORAL EVERY 12 HOURS SCHEDULED
Qty: 60 TABLET | Refills: 0 | Status: SHIPPED | OUTPATIENT
Start: 2023-04-29 | End: 2023-05-29

## 2023-04-29 RX ORDER — METOPROLOL SUCCINATE 50 MG/1
50 TABLET, EXTENDED RELEASE ORAL EVERY 12 HOURS SCHEDULED
Qty: 60 TABLET | Refills: 0 | Status: SHIPPED | OUTPATIENT
Start: 2023-04-29 | End: 2023-04-29 | Stop reason: SDUPTHER

## 2023-04-29 RX ADMIN — Medication 400 MG: at 11:13

## 2023-04-29 RX ADMIN — POTASSIUM CHLORIDE 40 MEQ: 10 CAPSULE, COATED, EXTENDED RELEASE ORAL at 11:13

## 2023-04-29 RX ADMIN — APIXABAN 5 MG: 5 TABLET, FILM COATED ORAL at 09:20

## 2023-04-29 RX ADMIN — FAMOTIDINE 20 MG: 20 TABLET ORAL at 09:20

## 2023-04-29 RX ADMIN — Medication 10 ML: at 09:20

## 2023-04-29 RX ADMIN — METOPROLOL SUCCINATE 50 MG: 50 TABLET, EXTENDED RELEASE ORAL at 09:20

## 2023-04-29 NOTE — PROGRESS NOTES
Date of service: 4/29/2023    Subjective: No chest pain, SOB, palpitations or dizziness    Review of systems: Negative for headache, vertigo, nausea, vomiting, abdominal pain, fever, chills, GI or  bleed.  No TIA or CVA-like symptoms.    General examination: She was in no pain or respiratory distress.  HEENT: Sclerae nonicteric.  Neck: No JVD or carotid bruit.    Chest: CTA.  No wheeze or rales  Cardiac: RRR. No S3 gallop.  2/6 SM over the right and left sternal borders.  No rub or clicks.  Abdomen: Soft, obese and nontender.  Bowel sounds were present.  Extremities: No edema, cyanosis or clubbing.  Pulse intact  Neuro: Alert, oriented x3, no facial droop and speech was clear.    Records: Reviewed.     1.  Atrial fibrillation with RVR, converted into sinus rhythm.  2.  Chronic left bundle branch block  3.  History of palpitations  4.  Continue the same current cardiac medications.  5.  Cardiac: Stable.  She may be discharged today.  Follow-up will be in 1 month in my office.

## 2023-04-29 NOTE — DISCHARGE SUMMARY
[facility]         HOSPITALIST  DISCHARGE SUMMARY    Patient Name: Paola Sow  : 1970  MRN: [id number]    Date of Admission: 2023  Date of Discharge: 2023  Primary Care Physician: Laurence Jacob APRN    Consults     Date and Time Order Name Status Description    2023  5:54 AM Inpatient Cardiology Consult      2023  3:31 AM Hospitalist (on-call MD unless specified)            Active and Resolved Hospital Problems:  Atrial fibrillation with RVR  Hypertension  Dyslipidemia  Active Hospital Problems    Diagnosis POA   • **Atrial fibrillation with RVR [I48.91] Yes      Resolved Hospital Problems   No resolved problems to display.       Hospital Course     Hospital Course:  Paola Sow is a 52 y.o. female with HTN, HLD, and atrial fibrillation (diagnosed last September) presents to ED with rapid heart rate.  She was in her usual state of health prior to being woken from her sleep tonight by the sensation of the palpitations in her chest.  The symptoms are similar to when she first found out she had atrial fibrillation.  She denies any associated symptoms other than some lightheadedness.  Patient presented to the emergency department for further evaluation and treatment.  Patient found to be in A-fib RVR.  Started on Cardizem drip.  Cardiology consulted.  Patient admitted to hospitalist service.  Further evaluation and treatment.  Home Toprol increased to 50 mg twice daily.  Patient converted to sinus rhythm and titrated off Cardizem drip.  Patient remains in sinus rhythm blood pressure within normal limits no further symptoms of palpitations or lightheadedness.  Patient seen and evaluated on day of discharge and thought stable for discharge home to follow-up with her primary care provider 1 to 2 weeks follow-up with cardiology in 1 month.        DISCHARGE Follow Up Recommendations for labs and diagnostics: As above      Day of Discharge     Vital Signs:  Temp:   [97.2 °F (36.2 °C)-99.3 °F (37.4 °C)] 98.6 °F (37 °C)  Heart Rate:  [] 74  Resp:  [16-18] 18  BP: ()/(42-76) 118/76  Physical Exam:   Gen. well-developed appearing stated age obese BMI 52 in no acute distress  HEENT: Normocephalic atraumatic moist membranes pupils equal round reactive light, no scleral icterus no conjunctival injection  Cardiovascular: regular rate and rhythm no murmurs rubs or gallops S1-S2, no lower extremity edema appreciated  Pulmonary: Clear to auscultation bilaterally no wheezes rales or rhonchi symmetric chest expansion, unlabored, no conversational dyspnea appreciated  Gastrointestinal: Soft nontender nondistended positive bowel sounds all 4 quadrants no rebound or guarding  Musculoskeletal: No clubbing cyanosis, warm and well-perfused, calves soft symmetric nontender bilaterally  Skin: Clean dry without rashs  Neuro: Cranial nerves II through XII intact grossly no sensorimotor deficits appreciated bilateral upper and lower extremities  Psych: Patient is calm cooperative and appropriate with exam not responding to internal stimuli  : No Lopez catheter no bladder distention no suprapubic tenderness      Discharge Details        Discharge Medications      Changes to Medications      Instructions Start Date   metoprolol succinate XL 50 MG 24 hr tablet  Commonly known as: TOPROL-XL  What changed: when to take this   50 mg, Oral, Every 12 Hours Scheduled         Continue These Medications      Instructions Start Date   acetaminophen 500 MG tablet  Commonly known as: TYLENOL   1,000 mg, Oral, Every 6 Hours PRN      apixaban 5 MG tablet tablet  Commonly known as: ELIQUIS   5 mg, Oral, 2 Times Daily      famotidine 20 MG tablet  Commonly known as: PEPCID   20 mg, Oral, Daily, daily       fluocinolone acetonide 0.01 % oil otic oil  Commonly known as: DermOtic   Use 5 drops bilateral ears twice daily for 3 weeks.             No Known Allergies    Discharge Disposition:  Home or Self  Care    Diet:  Hospital:  Diet Order   Procedures   • Diet: Regular/House Diet; Texture: Regular Texture (IDDSI 7); Fluid Consistency: Thin (IDDSI 0)       Discharge Activity:   Activity Instructions     Gradually Increase Activity Until at Pre-Hospitalization Level            CODE STATUS:  Code Status and Medical Interventions:   Ordered at: 04/28/23 0608     Code Status (Patient has no pulse and is not breathing):    CPR (Attempt to Resuscitate)     Medical Interventions (Patient has pulse or is breathing):    Full Support         Future Appointments   Date Time Provider Department Center   6/9/2023  8:15 AM Kassy Salgado APRN [facility]       Additional Instructions for the Follow-ups that You Need to Schedule     Discharge Follow-up with PCP   As directed       Currently Documented PCP:    Laurence Jacob APRN    PCP Phone Number:    [phone]     Follow Up Details: Hospital discharge follow-up A-fib RVR         Discharge Follow-up with Specialty: Dr. Frank Boykin cardiology; 1 Month   As directed      Specialty: Dr. Frank Boykin cardiology    Follow Up: 1 Month    Follow Up Details: Hospital discharge follow-up A-fib RVR               Pertinent  and/or Most Recent Results     PROCEDURES:   None    LAB RESULTS:      Lab 04/29/23  0359 04/28/23  0542 04/28/23  0233   WBC 10.26 10.30 9.15   HEMOGLOBIN 14.3 15.8 15.7   HEMATOCRIT 42.0 45.6 44.2   PLATELETS 276 305 280   NEUTROS ABS 6.44 7.84* 5.76   IMMATURE GRANS (ABS) 0.05 0.04 0.04   LYMPHS ABS 2.50 1.64 2.07   MONOS ABS 0.90 0.64 0.93*   EOS ABS 0.32 0.09 0.30   MCV 92.5 90.5 89.5         Lab 04/29/23  0359 04/28/23  0542 04/28/23  0233   SODIUM 137 137 139   POTASSIUM 3.6 4.3 3.8   CHLORIDE 105 103 103   CO2 20.3* 21.5* 24.3   ANION GAP 11.7 12.5 11.7   BUN 10 8 9   CREATININE 0.64 0.63 0.66   EGFR 106.5 106.9 105.7   GLUCOSE 94 126* 136*   CALCIUM 8.4* 9.3 9.3   MAGNESIUM 1.8 1.8 1.8   TSH  --   --  3.640         Lab 04/29/23  0359 04/28/23  023    TOTAL PROTEIN 5.9* 6.9   ALBUMIN 3.2* 3.7   GLOBULIN 2.7 3.2   ALT (SGPT) 15 19   AST (SGOT) 14 19   BILIRUBIN 0.4 0.3   ALK PHOS 73 87         Lab 04/28/23  0821 04/28/23  0542 04/28/23  0233   HSTROP T 13* 15* 10*                 Brief Urine Lab Results     None        Microbiology Results (last 10 days)     ** No results found for the last 240 hours. **          XR Chest 1 View    Result Date: 4/28/2023  Impression:   No acute infiltrate is appreciated.     Please note that portions of this note were completed with a voice recognition program.  FRANCISCO MALLOY JR, MD       Electronically Signed and Approved By: FRANCISCO MALLOY JR, MD on 4/28/2023 at 2:41                        Results for orders placed during the hospital encounter of 09/01/22    Adult Transthoracic Echo Complete W/ Cont if Necessary Per Protocol    Interpretation Summary  · Estimated right ventricular systolic pressure from tricuspid regurgitation is normal (<35 mmHg). Calculated right ventricular systolic pressure from tricuspid regurgitation is 26 mmHg.  · Left atrial volume is mildly increased.  · Left ventricular ejection fraction appears to be 51 - 55%.  · Left ventricular diastolic function was indeterminate.  · Left ventricular wall thickness is consistent with mild to moderate concentric hypertrophy.    There were no apparent intracardiac masses, vegetations or thrombi.      Labs Pending at Discharge:        Time spent on Discharge including face to face service: 25 minutes    Electronically signed by Kyle Greenberg MD, 04/29/23, 3:25 PM EDT.

## 2023-04-29 NOTE — OUTREACH NOTE
Prep Survey    Flowsheet Row Responses   [facility] patient discharged from? [place]   Is LACE score < 7 ? Yes   Eligibility [facility]   Date of Admission 04/28/23   Date of Discharge 04/29/23   Discharge Disposition Home or Self Care   Discharge diagnosis Atrial fibrillation with RVR   Does the patient have one of the following disease processes/diagnoses(primary or secondary)? Other   Does the patient have Home health ordered? No   Is there a DME ordered? No   Prep survey completed? Yes          [facility] ALFONSO - Registered Nurse

## 2023-05-01 ENCOUNTER — TRANSITIONAL CARE MANAGEMENT TELEPHONE ENCOUNTER (OUTPATIENT)
Dept: CALL CENTER | Facility: HOSPITAL | Age: 53
End: 2023-05-01
Payer: COMMERCIAL

## 2023-05-01 NOTE — OUTREACH NOTE
Call Center TCM Note    Flowsheet Row Responses   [facility] patient discharged from? [place]   Does the patient have one of the following disease processes/diagnoses(primary or secondary)? Other   TCM attempt successful? No   Unsuccessful attempts Attempt 1   Call Status Left message          Fernanda Zarate RN    5/1/2023, 15:39 EDT

## 2023-05-01 NOTE — OUTREACH NOTE
Call Center TCM Note    Flowsheet Row Responses   [facility] patient discharged from? [place]   Does the patient have one of the following disease processes/diagnoses(primary or secondary)? Other   TCM attempt successful? No   Unsuccessful attempts Attempt 2          Fernanda Zarate RN    5/1/2023, 16:21 EDT

## 2023-05-02 ENCOUNTER — TRANSITIONAL CARE MANAGEMENT TELEPHONE ENCOUNTER (OUTPATIENT)
Dept: CALL CENTER | Facility: HOSPITAL | Age: 53
End: 2023-05-02
Payer: COMMERCIAL

## 2023-05-02 NOTE — OUTREACH NOTE
Call Center TCM Note    Flowsheet Row Responses   [facility] patient discharged from? [place]   Does the patient have one of the following disease processes/diagnoses(primary or secondary)? Other   TCM attempt successful? Yes   Call start time 1507   Call end time 1507   Discharge diagnosis Atrial fibrillation with RVR   Meds reviewed with patient/caregiver? Yes   Is the patient having any side effects they believe may be caused by any medication additions or changes? No   Does the patient have all medications ordered at discharge? Yes   Is the patient taking all medications as directed (includes completed medication regime)? Yes   Comments Pt will call when back in town to schedule.    Does the patient have an appointment with their PCP within 7 days of discharge? No   Nursing Interventions Patient declined scheduling/rescheduling appointment at this time   Has home health visited the patient within 72 hours of discharge? N/A   Psychosocial issues? No   Did the patient receive a copy of their discharge instructions? Yes   Nursing interventions Reviewed instructions with patient   What is the patient's perception of their health status since discharge? Improving   TCM call completed? Yes   Call end time 1507          Suzy Orozco RN    5/2/2023, 15:08 EDT

## 2023-05-15 LAB — QT INTERVAL: 358 MS

## 2023-05-26 ENCOUNTER — OFFICE VISIT (OUTPATIENT)
Dept: OBSTETRICS AND GYNECOLOGY | Facility: CLINIC | Age: 53
End: 2023-05-26
Payer: COMMERCIAL

## 2023-05-26 VITALS
SYSTOLIC BLOOD PRESSURE: 149 MMHG | HEIGHT: 67 IN | HEART RATE: 76 BPM | BODY MASS INDEX: 52.33 KG/M2 | DIASTOLIC BLOOD PRESSURE: 84 MMHG

## 2023-05-26 DIAGNOSIS — K64.4 EXTERNAL HEMORRHOIDS: ICD-10-CM

## 2023-05-26 DIAGNOSIS — Z01.419 ENCOUNTER FOR GYNECOLOGICAL EXAMINATION WITHOUT ABNORMAL FINDING: Primary | ICD-10-CM

## 2023-05-26 NOTE — PROGRESS NOTES
"Well Woman Visit    CC: Scheduled annual well gyn visit  Chief Complaint   Patient presents with   • Gynecologic Exam     Wwe last pap 3.18.22       BRCAssure or VistaSeq intake in the past?: no      Contraception:  None  Social History     Substance and Sexual Activity   Sexual Activity Yes   • Partners: Male       HPI:   52 y.o.No obstetric history on file.     Menses:   Has had two cycles this year, only had two cycles last year, they are heavy since she started her blood thinner, bleeds for 4-5 days, Changes every 1-2 hours on heaviest days  Pain:  Mild, OTC meds control discomfort    PCP: does manage PMHx and preventative labs  History: PMHx, Meds, Allergies, PSHx, Social Hx, and POBHx all reviewed and updated.    Pt has no complaints today.  Does have significant hemorrhoids, would like to investigate treatment.     PHYSICAL EXAM:  /84   Pulse 76   Ht 170.2 cm (67.01\")   BMI 52.33 kg/m²  Not found.  General- NAD, alert and oriented, appropriate  Psych- Normal mood, good memory  Neck- No masses, no thyroid enlargement  CV- Regular rhythm, no murnurs  Resp- CTA to bases, no wheezes  Abdomen- Soft, non distended, non tender, no masses    Breast left-  Bilaterally symmetrical, no masses, non tender, no nipple discharge  Breast right- Bilaterally symmetrical, no masses, non tender, no nipple discharge    External genitalia- Normal female, no lesions  Urethra/meatus- Normal, no masses, non tender  Bladder- Normal, no masses, non tender  Vagina- Normal, no atrophy, no lesions, no discharge.  Prolapse: none noted, not examined with split speculum to delineate  Cvx- Normal, no lesions, no discharge, No cervical motion tenderness  Uterus- Normal size, shape & consistency.  Non tender, mobile, & no prolapse  Adnexa- No mass, non tender  Anus/Rectum/Perineum- Mulitple hemorrhoids noted.     Lymphatic- No palpable neck, axillary, or groin nodes  Ext- No edema, no cyanosis    Skin- No lesions, no rashes, no " acanthosis nigricans      ASSESSMENT and PLAN:    Diagnoses and all orders for this visit:    1. Encounter for gynecological examination without abnormal finding (Primary)  -     Mammo Screening Digital Tomosynthesis Bilateral With CAD; Future  -     IgP, Aptima HPV    2. External hemorrhoids  -     Ambulatory Referral to General Surgery        Preventative:  • BREAST HEALTH- Monthly self breast exam importance and how to reviewed. MMG and/or MRI (prn) reviewed per society guidelines and her individual history. Screen: Updated today  • CERVICAL CANCER Screening- Reviewed current ASCCP guidelines for screening w and wo cotest HPV, age specific.  Screen: Updated today, Per patient request  • COLON CANCER Screening- Reviewed current medical society guidelines and options.  Screen:  Already up to date  • SEXUAL HEALTH: Declines STD screening  • BONE HEALTH- Reviewed current medical society guidelines and options for testing, calcium and vit D intake.  Weight bearing exercise.  DEXA: Not medically needed  • VACCINATIONS Recommended: COVID and booster PRN, Flu annually, Gardisil/HPV vaccine (up to 46yo), Zoster (49yo and older).  Importance discussed, risk being unvaccinated reviewed.  Questions answered  • Smoking status- NON SMOKER      She understands the importance of having any ordered tests to be performed in a timely fashion.  The risks of not performing them include, but are not limited to, advanced cancer stages, bone loss from osteoporosis and/or subsequent increase in morbidity and/or mortality.  She is encouraged to review her results online and/or contact or office if she has questions.     Follow Up:  Return in about 1 year (around 5/26/2024) for Annual physical.        Bry Orantes, APRN  05/26/2023    [facility]  [facility]  [address] [place] [id number]  Dept: [phone]  Loc: [phone]

## 2023-06-09 ENCOUNTER — OFFICE VISIT (OUTPATIENT)
Dept: SURGERY | Facility: CLINIC | Age: 53
End: 2023-06-09
Payer: COMMERCIAL

## 2023-06-09 ENCOUNTER — OFFICE VISIT (OUTPATIENT)
Dept: PULMONOLOGY | Facility: CLINIC | Age: 53
End: 2023-06-09
Payer: COMMERCIAL

## 2023-06-09 VITALS — HEIGHT: 67 IN | RESPIRATION RATE: 14 BRPM | BODY MASS INDEX: 45.99 KG/M2 | WEIGHT: 293 LBS

## 2023-06-09 VITALS
RESPIRATION RATE: 18 BRPM | TEMPERATURE: 98.6 F | OXYGEN SATURATION: 95 % | BODY MASS INDEX: 45.99 KG/M2 | DIASTOLIC BLOOD PRESSURE: 77 MMHG | SYSTOLIC BLOOD PRESSURE: 132 MMHG | HEIGHT: 67 IN | WEIGHT: 293 LBS | HEART RATE: 75 BPM

## 2023-06-09 DIAGNOSIS — K64.4 ANAL SKIN TAG: Primary | ICD-10-CM

## 2023-06-09 DIAGNOSIS — E66.01 MORBID OBESITY WITH BMI OF 50.0-59.9, ADULT: ICD-10-CM

## 2023-06-09 DIAGNOSIS — G47.33 OSA (OBSTRUCTIVE SLEEP APNEA): Primary | ICD-10-CM

## 2023-06-09 DIAGNOSIS — I48.91 ATRIAL FIBRILLATION WITH RVR: ICD-10-CM

## 2023-06-09 DIAGNOSIS — K64.4 EXTERNAL HEMORRHOIDS: ICD-10-CM

## 2023-06-09 NOTE — PROGRESS NOTES
Chief Complaint:  Hemorrhoids    Primary Care Provider: Laurence Jacob APRN    Referring Provider: Bry Orantes APRN    History of Present Illness  Paola Sow is a 52 y.o. female referred by DEWEY Coronado evaluation for hemorrhoids.  The patient says she occasionally has a mild amount of bleeding when she wipes and she takes Eliquis and is worried that the bleeding sometime may become more prominent because of the effect of Eliquis.  She knows she has some hemorrhoids and is wondering if they could be banded.      Allergies: Patient has no known allergies.    Outpatient Medications Marked as Taking for the 6/9/23 encounter (Office Visit) with Monroe Ryan MD   Medication Sig Dispense Refill   • acetaminophen (TYLENOL) 500 MG tablet Take 2 tablets by mouth Every 6 (Six) Hours As Needed for Mild Pain (try to use this instead of meloxicam). 200 tablet 0   • apixaban (ELIQUIS) 5 MG tablet tablet Take 1 tablet by mouth 2 (Two) Times a Day.     • famotidine (PEPCID) 20 MG tablet Take 1 tablet by mouth Daily. daily     • fluocinolone acetonide (DermOtic) 0.01 % oil otic oil Use 5 drops bilateral ears twice daily for 3 weeks. 20 mL 2       Past Medical History:   • Acid reflux disease   • Anxiety   • Arthritis   • Asthma   • Callus   • Fallen arches   • Heart disease   • Heart disease   • Hemorrhoids   • High blood pressure   • Hypertension   • Ingrown toenail   • Plantar warts   • Rectal bleeding   • Sleep apnea   • Tinnitus        Past Surgical History:   • COLONOSCOPY   • DENTAL PROCEDURE   • FOOT SURGERY       Family History:   Family History   Problem Relation Age of Onset   • Prostate cancer Father    • Rashes / Skin problems Father    • Heart disease Father    • Cancer Father    • Skin cancer Father    • Rashes / Skin problems Mother    • Rheum arthritis Mother    • Heart disease Mother    • Osteoporosis Mother    • Arthritis Mother    • Lupus Mother    • Hearing loss Mother    •  "Hashimoto's thyroiditis Sister    • Diabetes Neg Hx    • Stroke Neg Hx         Social History:  Social History     Tobacco Use   • Smoking status: Never   • Smokeless tobacco: Never   Substance Use Topics   • Alcohol use: Yes     Alcohol/week: 1.0 standard drink     Types: 1 Glasses of wine per week     Comment: I only drink sporadically.       Objective     Vital Signs:  Resp 14   Ht 170.2 cm (67\")   Wt (!) 151 kg (331 lb 12.8 oz)   BMI 51.97 kg/m²   • Constitutional: here alone, alert, no acute distress, reliable historian  • HENT:  NCAT, no visible deformities or lesions  • Eyes:  sclerae clear, conjunctivae clear, EOMI  • Neck:  normal appearance, no masses, trachea midline  • Respiratory:  breathing not labored, respiratory effort appears normal  • Cardiovascular:  heart regular rate  • Abdomen:  nondistended    • Skin and subcutaneous tissue:  Warm and dry  • Musculoskeletal: moving all extremities symmetrically and purposefully  • Neurologic:  no obvious motor or sensory deficits, speech clear  • Anal: Performed with KARAN Colindres present in the room as a chaperone.  There is a moderate size anal skin tag and several small, nontender, soft external hemorrhoid      Assessment:  Diagnoses and all orders for this visit:    1. Anal skin tag (Primary)    2. External hemorrhoids        Plan:  I explained to the patient that external hemorrhoids are not amenable to banding.  We talked about the option of surgical hemorrhoidectomy along with removal of the anal skin tag.  If patient decides she wants to proceed then she will schedule appointment to see me again.  I advised the patient to use wet wipes instead of toilet paper to wipe after bowel movements.    Monroe Ryan MD  06/09/2023    Electronically signed by Monroe Ryan MD, 06/09/23, 3:56 PM EDT.      "

## 2023-06-09 NOTE — PROGRESS NOTES
Primary Care Provider  Luarence Jacob APRN     Referring Provider  No ref. provider found     Chief Complaint  Follow-up (1 year follow up) and Sleep Apnea    Subjective          Paola Sow presents to [facility]  History of Present Illness  Paola Sow is a 52 y.o. female patient previously seen by Dr. Lubin.  She is here for management of sleep apnea.     Patient states she is doing well since her last visit.  She denies using any antibiotics or steroids for her lungs.  She denies any fevers, chills, or cough.  She continues wear her CPAP machine at night with no issues.  Patient's most recent compliance report is from 3/11/2023 until 6/8/2023.  This shows a usage of 90/90 days, average usage 6 hours 57 minutes with an AHI of 0.5.  Patient is on auto CPAP 12-16.  Her mean pressure is 12.4 cm water.  She denies any excessive daytime sleepiness or morning headaches.  She states that she has had 2 hospitalizations related to atrial fibrillation.  She is now taking Eliquis daily.  Patient states that she can notice when she goes into an irregular rhythm but states that her symptoms have been more controlled recently.  Otherwise, patient is doing well from a lung standpoint.  She is able to perform her ADLs without difficulty.  She is up-to-date with her COVID and pneumonia vaccines.     Her history of smoking is   Tobacco Use: Low Risk     Smoking Tobacco Use: Never    Smokeless Tobacco Use: Never    Passive Exposure: Not on file   .    Review of Systems   Constitutional:  Negative for chills, fatigue, fever, unexpected weight gain and unexpected weight loss.   HENT:  Negative for congestion (Nasal), hearing loss, mouth sores, nosebleeds, postnasal drip, sore throat and trouble swallowing.    Respiratory:  Negative for apnea, cough, shortness of breath and wheezing.         Negative for Hemoptysis     Cardiovascular:  Negative for chest pain,  palpitations and leg swelling.   Gastrointestinal:  Negative for constipation, diarrhea, nausea, vomiting and GERD.   Skin:  Negative for color change.        Negative for cyanosis   Neurological:  Negative for syncope, weakness, numbness and headache.    Sleep: Negative for Excessive daytime sleepiness  Negative for morning headaches  Negative for Snoring    Family History   Problem Relation Age of Onset    Prostate cancer Father     Rashes / Skin problems Father     Heart disease Father     Cancer Father     Skin cancer Father     Rashes / Skin problems Mother     Rheum arthritis Mother     Heart disease Mother     Osteoporosis Mother     Arthritis Mother     Lupus Mother     Hearing loss Mother     Hashimoto's thyroiditis Sister     Diabetes Neg Hx     Stroke Neg Hx         Social History     Socioeconomic History    Marital status:    Tobacco Use    Smoking status: Never    Smokeless tobacco: Never   Vaping Use    Vaping Use: Never used   Substance and Sexual Activity    Alcohol use: Yes     Alcohol/week: 1.0 standard drink     Types: 1 Glasses of wine per week     Comment: I only drink sporadically.    Drug use: Never    Sexual activity: Yes     Partners: Male        Past Medical History:   Diagnosis Date    Acid reflux disease     Anxiety     Arthritis     Asthma     Callus     Fallen arches     Heart disease     Heart disease     Hemorrhoids     High blood pressure     Hypertension     Ingrown toenail     Plantar warts     Rectal bleeding     Sleep apnea     Tinnitus         Immunization History   Administered Date(s) Administered    COVID-19 (JAMAAL) 03/11/2021    COVID-19 (MODERNA) 1st,2nd,3rd Dose Monovalent 10/23/2021    COVID-19 (MODERNA) BIVALENT 12+YRS 11/11/2022    Flu Vaccine Intradermal Quad 18-64YR 10/01/2021    Influenza, Unspecified 10/01/2019, 10/09/2020    Pneumococcal Conjugate 20-Valent (PCV20) 05/24/2022    Shingrix 09/14/2022, 01/22/2023         No Known Allergies       Current  Outpatient Medications:     acetaminophen (TYLENOL) 500 MG tablet, Take 2 tablets by mouth Every 6 (Six) Hours As Needed for Mild Pain (try to use this instead of meloxicam)., Disp: 200 tablet, Rfl: 0    apixaban (ELIQUIS) 5 MG tablet tablet, Take 1 tablet by mouth 2 (Two) Times a Day., Disp: , Rfl:     famotidine (PEPCID) 20 MG tablet, Take 1 tablet by mouth Daily. daily, Disp: , Rfl:     fluocinolone acetonide (DermOtic) 0.01 % oil otic oil, Use 5 drops bilateral ears twice daily for 3 weeks., Disp: 20 mL, Rfl: 2    metoprolol succinate XL (TOPROL-XL) 50 MG 24 hr tablet, Take 1 tablet by mouth Every 12 (Twelve) Hours for 30 days., Disp: 60 tablet, Rfl: 0     Objective   Physical Exam  Constitutional:       General: She is not in acute distress.     Appearance: Normal appearance. She is obese.   HENT:      Right Ear: Hearing normal.      Left Ear: Hearing normal.      Nose: No nasal tenderness or congestion.      Mouth/Throat:      Mouth: Mucous membranes are moist. No oral lesions.   Eyes:      Extraocular Movements: Extraocular movements intact.      Pupils: Pupils are equal, round, and reactive to light.   Neck:      Thyroid: No thyroid mass or thyromegaly.   Cardiovascular:      Rate and Rhythm: Normal rate and regular rhythm.      Pulses: Normal pulses.      Heart sounds: Normal heart sounds. No murmur heard.  Pulmonary:      Effort: Pulmonary effort is normal.      Breath sounds: Normal breath sounds. No wheezing, rhonchi or rales.   Musculoskeletal:      Cervical back: Neck supple.      Right lower leg: No edema.      Left lower leg: No edema.   Lymphadenopathy:      Cervical: No cervical adenopathy.      Upper Body:      Right upper body: No axillary adenopathy.   Skin:     General: Skin is warm and dry.      Findings: No lesion or rash.   Neurological:      General: No focal deficit present.      Mental Status: She is alert and oriented to person, place, and time.   Psychiatric:         Mood and Affect:  "Affect normal. Mood is not anxious or depressed.       Vital Signs:   /77 (BP Location: Left arm, Patient Position: Sitting, Cuff Size: Large Adult)   Pulse 75   Temp 98.6 °F (37 °C) (Temporal)   Resp 18   Ht 170.2 cm (67\")   Wt (!) 151 kg (333 lb)   SpO2 95% Comment: Room air  BMI 52.16 kg/m²        Result Review :   The following data was reviewed by: DEWEY Dale on 06/09/2023:  CMP          9/2/2022    04:19 4/28/2023    02:33 4/28/2023    05:42 4/29/2023    03:59   CMP   Glucose 129  136  126  94    BUN 11  9  8  10    Creatinine 0.69  0.66  0.63  0.64    EGFR 105.2  105.7  106.9  106.5    Sodium 141  139  137  137    Potassium 4.0  3.8  4.3  3.6    Chloride 107  103  103  105    Calcium 8.8  9.3  9.3  8.4    Total Protein  6.9   5.9    Albumin  3.7   3.2    Globulin  3.2   2.7    Total Bilirubin  0.3   0.4    Alkaline Phosphatase  87   73    AST (SGOT)  19   14    ALT (SGPT)  19   15    Albumin/Globulin Ratio  1.2   1.2    BUN/Creatinine Ratio 15.9  13.6  12.7  15.6    Anion Gap 11.1  11.7  12.5  11.7      CBC w/diff          9/2/2022    04:19 4/28/2023    02:33 4/28/2023    05:42 4/29/2023    03:59   CBC w/Diff   WBC 9.48  9.15  10.30  10.26    RBC 4.60  4.94  5.04  4.54    Hemoglobin 14.4  15.7  15.8  14.3    Hematocrit 41.5  44.2  45.6  42.0    MCV 90.2  89.5  90.5  92.5    MCH 31.3  31.8  31.3  31.5    MCHC 34.7  35.5  34.6  34.0    RDW 12.0  12.8  12.6  13.2    Platelets 260  280  305  276    Neutrophil Rel % 69.4  63.0  76.1  62.7    Immature Granulocyte Rel % 0.3  0.4  0.4  0.5    Lymphocyte Rel % 20.3  22.6  15.9  24.4    Monocyte Rel % 7.7  10.2  6.2  8.8    Eosinophil Rel % 1.8  3.3  0.9  3.1    Basophil Rel % 0.5  0.5  0.5  0.5      Data reviewed : Radiologic studies chest CT 4/28/2023 and my last office note    Procedures        Assessment and Plan    Diagnoses and all orders for this visit:    1. MARGOT (obstructive sleep apnea) (Primary)    2. Morbid obesity with BMI of " 50.0-59.9, adult    3. Atrial fibrillation with RVR    4.  Continue CPAP at current settings.  Clean mask continue daily.  5.  Continue follow-up with Dr. Frank Boykin for management of atrial fibrillation.   6.  Encourage patient try to stay as active as possible.  Recommend 30 minutes of daily activity.  7.  Follow-up in 1 year, sooner if needed.    I spent 30 minutes caring for Paola on this date of service. This time includes time spent by me in the following activities:preparing for the visit, reviewing tests, obtaining and/or reviewing a separately obtained history, performing a medically appropriate examination and/or evaluation , counseling and educating the patient/family/caregiver, and documenting information in the medical record    Follow Up   Return in about 1 year (around 6/9/2024) for Recheck with Kassy.  Patient was given instructions and counseling regarding her condition or for health maintenance advice. Please see specific information pulled into the AVS if appropriate.

## 2023-06-09 NOTE — PATIENT INSTRUCTIONS
Sleep Apnea  Sleep apnea is a condition in which breathing pauses or becomes shallow during sleep. People with sleep apnea usually snore loudly. They may have times when they gasp and stop breathing for 10 seconds or more during sleep. This may happen many times during the night.  Sleep apnea disrupts your sleep and keeps your body from getting the rest that it needs. This condition can increase your risk of certain health problems, including:  Heart attack.  Stroke.  Obesity.  Type 2 diabetes.  Heart failure.  Irregular heartbeat.  High blood pressure.  The goal of treatment is to help you breathe normally again.  What are the causes?  The most common cause of sleep apnea is a collapsed or blocked airway.  There are three kinds of sleep apnea:  Obstructive sleep apnea. This kind is caused by a blocked or collapsed airway.  Central sleep apnea. This kind happens when the part of the brain that controls breathing does not send the correct signals to the muscles that control breathing.  Mixed sleep apnea. This is a combination of obstructive and central sleep apnea.  What increases the risk?  You are more likely to develop this condition if you:  Are overweight.  Smoke.  Have a smaller than normal airway.  Are older.  Are male.  Drink alcohol.  Take sedatives or tranquilizers.  Have a family history of sleep apnea.  Have a tongue or tonsils that are larger than normal.  What are the signs or symptoms?  Symptoms of this condition include:  Trouble staying asleep.  Loud snoring.  Morning headaches.  Waking up gasping.  Dry mouth or sore throat in the morning.  Daytime sleepiness and tiredness.  If you have daytime fatigue because of sleep apnea, you may be more likely to have:  Trouble concentrating.  Forgetfulness.  Irritability or mood swings.  Personality changes.  Feelings of depression.  Sexual dysfunction. This may include loss of interest if you are female, or erectile dysfunction if you are male.  How is this  diagnosed?  This condition may be diagnosed with:  A medical history.  A physical exam.  A series of tests that are done while you are sleeping (sleep study). These tests are usually done in a sleep lab, but they may also be done at home.  How is this treated?  Treatment for this condition aims to restore normal breathing and to ease symptoms during sleep. It may involve managing health issues that can affect breathing, such as high blood pressure or obesity. Treatment may include:  Sleeping on your side.  Using a decongestant if you have nasal congestion.  Avoiding the use of depressants, including alcohol, sedatives, and narcotics.  Losing weight if you are overweight.  Making changes to your diet.  Quitting smoking.  Using a device to open your airway while you sleep, such as:  An oral appliance. This is a custom-made mouthpiece that shifts your lower jaw forward.  A continuous positive airway pressure (CPAP) device. This device blows air through a mask when you breathe out (exhale).  A nasal expiratory positive airway pressure (EPAP) device. This device has valves that you put into each nostril.  A bi-level positive airway pressure (BIPAP) device. This device blows air through a mask when you breathe in (inhale) and breathe out (exhale).  Having surgery if other treatments do not work. During surgery, excess tissue is removed to create a wider airway.  Follow these instructions at home:  Lifestyle  Make any lifestyle changes that your health care provider recommends.  Eat a healthy, well-balanced diet.  Take steps to lose weight if you are overweight.  Avoid using depressants, including alcohol, sedatives, and narcotics.  Do not use any products that contain nicotine or tobacco. These products include cigarettes, chewing tobacco, and vaping devices, such as e-cigarettes. If you need help quitting, ask your health care provider.  General instructions  Take over-the-counter and prescription medicines only as told  by your health care provider.  If you were given a device to open your airway while you sleep, use it only as told by your health care provider.  If you are having surgery, make sure to tell your health care provider you have sleep apnea. You may need to bring your device with you.  Keep all follow-up visits. This is important.  Contact a health care provider if:  The device that you received to open your airway during sleep is uncomfortable or does not seem to be working.  Your symptoms do not improve.  Your symptoms get worse.  Get help right away if:  You develop:  Chest pain.  Shortness of breath.  Discomfort in your back, arms, or stomach.  You have:  Trouble speaking.  Weakness on one side of your body.  Drooping in your face.  These symptoms may represent a serious problem that is an emergency. Do not wait to see if the symptoms will go away. Get medical help right away. Call your local emergency services (911 in the [place] Do not drive yourself to the hospital.  Summary  Sleep apnea is a condition in which breathing pauses or becomes shallow during sleep.  The most common cause is a collapsed or blocked airway.  The goal of treatment is to restore normal breathing and to ease symptoms during sleep.  This information is not intended to replace advice given to you by your health care provider. Make sure you discuss any questions you have with your health care provider.  Document Revised: 07/27/2022 Document Reviewed: 11/26/2021  [employer] Patient Education © 2022 [employer]  Obesity, Adult  Obesity is the condition of having too much total body fat. Being overweight or obese means that your weight is greater than what is considered healthy for your body size. Obesity is determined by a measurement called BMI (body mass index). BMI is an estimate of body fat and is calculated from height and weight. For adults, a BMI of 30 or higher is considered obese.  Obesity can lead to other health concerns and major  illnesses, including:  Stroke.  Coronary artery disease (CAD).  Type 2 diabetes.  Some types of cancer, including cancers of the colon, breast, uterus, and gallbladder.  High blood pressure (hypertension).  High cholesterol.  Gallbladder stones.  Obesity can also contribute to:  Osteoarthritis.  Sleep apnea.  Infertility problems.  What are the causes?  Common causes of this condition include:  Eating daily meals that are high in calories, sugar, and fat.  Drinking high amounts of sugar-sweetened beverages, such as soft drinks.  Being born with genes that may make you more likely to become obese.  Having a medical condition that causes obesity, including:  Hypothyroidism.  Polycystic ovarian syndrome (PCOS).  Binge-eating disorder.  Cushing syndrome.  Taking certain medicines, such as steroids, antidepressants, and seizure medicines.  Not being physically active (sedentary lifestyle).  Not getting enough sleep.  What increases the risk?  The following factors may make you more likely to develop this condition:  Having a family history of obesity.  Living in an area with limited access to:  [place], recreation centers, or sidewalks.  Healthy food choices, such as grocery stores and farmers' markets.  What are the signs or symptoms?  The main sign of this condition is having too much body fat.  How is this diagnosed?  This condition is diagnosed based on:  Your BMI. If you are an adult with a BMI of 30 or higher, you are considered obese.  Your waist circumference. This measures the distance around your waistline.  Your skinfold thickness. Your health care provider may gently pinch a fold of your skin and measure it.  You may have other tests to check for underlying conditions.  How is this treated?  Treatment for this condition often includes changing your lifestyle. Treatment may include some or all of the following:  Dietary changes. This may include developing a healthy meal plan.  Regular physical activity. This  may include activity that causes your heart to beat faster (aerobic exercise) and strength training. Work with your health care provider to design an exercise program that works for you.  Medicine to help you lose weight if you are unable to lose one pound a week after six weeks of healthy eating and more physical activity.  Treating conditions that cause the obesity (underlying conditions).  Surgery. Surgical options may include gastric banding and gastric bypass. Surgery may be done if:  Other treatments have not helped to improve your condition.  You have a BMI of 40 or higher.  You have life-threatening health problems related to obesity.  Follow these instructions at home:  Eating and drinking    Follow recommendations from your health care provider about what you eat and drink. Your health care provider may advise you to:  Limit fast food, sweets, and processed snack foods.  Choose low-fat options, such as low-fat milk instead of whole milk.  Eat five or more servings of fruits or vegetables every day.  Choose healthy foods when you eat out.  Keep low-fat snacks available.  Limit sugary drinks, such as soda, fruit juice, sweetened iced tea, and flavored milk.  Drink enough water to keep your urine pale yellow.  Do not follow a fad diet. Fad diets can be unhealthy and even dangerous.  Other healthful choices include:  Eat at home more often. This gives you more control over what you eat.  Learn to read food labels. This will help you understand how much food is considered one serving.  Learn what a healthy serving size is.  Physical activity  Exercise regularly, as told by your health care provider.  Most adults should get up to 150 minutes of moderate-intensity exercise every week.  Ask your health care provider what types of exercise are safe for you and how often you should exercise.  Warm up and stretch before being active.  Cool down and stretch after being active.  Rest between periods of  activity.  Lifestyle  Work with your health care provider and a dietitian to set a weight-loss goal that is healthy and reasonable for you.  Limit your screen time.  Find ways to reward yourself that do not involve food.  Do not drink alcohol if:  Your health care provider tells you not to drink.  You are pregnant, may be pregnant, or are planning to become pregnant.  If you drink alcohol:  Limit how much you have to:  0-1 drink a day for women.  0-2 drinks a day for men.  Know how much alcohol is in your drink. In the [place] one drink equals one 12 oz bottle of beer (355 mL), one 5 oz glass of wine (148 mL), or one 1½ oz glass of hard liquor (44 mL).  General instructions  Keep a weight-loss journal to keep track of the food you eat and how much exercise you get.  Take over-the-counter and prescription medicines only as told by your health care provider.  Take vitamins and supplements only as told by your health care provider.  Consider joining a support group. Your health care provider may be able to recommend a support group.  Pay attention to your mental health as obesity can lead to depression or self esteem issues.  Keep all follow-up visits. This is important.  Contact a health care provider if:  You are unable to meet your weight-loss goal after six weeks of dietary and lifestyle changes.  You have trouble breathing.  Summary  Obesity is the condition of having too much total body fat.  Being overweight or obese means that your weight is greater than what is considered healthy for your body size.  Work with your health care provider and a dietitian to set a weight-loss goal that is healthy and reasonable for you.  Exercise regularly, as told by your health care provider. Ask your health care provider what types of exercise are safe for you and how often you should exercise.  This information is not intended to replace advice given to you by your health care provider. Make sure you discuss any questions you  have with your health care provider.  Document Revised: 07/26/2022 Document Reviewed: 07/26/2022  [employer] Patient Education © 2022 [employer]

## 2023-06-16 ENCOUNTER — OFFICE VISIT (OUTPATIENT)
Dept: PODIATRY | Facility: CLINIC | Age: 53
End: 2023-06-16
Payer: COMMERCIAL

## 2023-06-16 VITALS
OXYGEN SATURATION: 97 % | SYSTOLIC BLOOD PRESSURE: 135 MMHG | HEIGHT: 67 IN | HEART RATE: 64 BPM | DIASTOLIC BLOOD PRESSURE: 78 MMHG | TEMPERATURE: 98.6 F | BODY MASS INDEX: 45.99 KG/M2 | WEIGHT: 293 LBS

## 2023-06-16 DIAGNOSIS — M25.571 SINUS TARSI SYNDROME OF RIGHT ANKLE: ICD-10-CM

## 2023-06-16 DIAGNOSIS — M79.671 FOOT PAIN, RIGHT: Primary | ICD-10-CM

## 2023-06-16 RX ORDER — TRAZODONE HYDROCHLORIDE 50 MG/1
TABLET ORAL EVERY 24 HOURS
COMMUNITY

## 2023-06-16 RX ORDER — BUPIVACAINE HYDROCHLORIDE 2.5 MG/ML
0.5 INJECTION, SOLUTION INFILTRATION; PERINEURAL ONCE
Status: COMPLETED | OUTPATIENT
Start: 2023-06-16 | End: 2023-06-16

## 2023-06-16 RX ORDER — TESTOSTERONE CYPIONATE 200 MG/ML
4 INJECTION INTRAMUSCULAR ONCE
Status: COMPLETED | OUTPATIENT
Start: 2023-06-16 | End: 2023-06-16

## 2023-06-16 RX ADMIN — TESTOSTERONE CYPIONATE 4 MG: 200 INJECTION INTRAMUSCULAR at 08:46

## 2023-06-16 RX ADMIN — BUPIVACAINE HYDROCHLORIDE 0.5 ML: 2.5 INJECTION, SOLUTION INFILTRATION; PERINEURAL at 08:47

## 2023-06-16 NOTE — PROGRESS NOTES
[facility] - PODIATRY    Today's Date: 06/16/23    Patient Name: Paola Sow  MRN: [id number]  CSN: [id number]  PCP: Laurence Jacob APRN, Last PCP Visit: 14 January 2022  Referring Provider: No ref. provider found    SUBJECTIVE     Chief Complaint   Patient presents with   • Right Ankle - Pain     Onset since December 2022 c/o sharp pains sees Rheumatology but has had no treatment     HPI: Paola Sow, a 52 y.o.female, presents to clinic.    New, Established, New Problem: New problem  Location: Anterior lateral right ankle  Duration: Over the past year  Onset: Patient states that since her last visit that she was diagnosed with A-fib and started on Eliquis.  She states that due to this that she was taken off all her NSAIDs.  She stated started developing pain chief complaint area after this.  Nature: Sore, stiff  Stable, worsening, improving: Worsening  Aggravating factors:  Patient relates pain is aggravated by shoe gear and ambulation.    Previous Treatment: Custom made orthotics    Patient denies any fevers, chills, nausea, vomiting, shortness of breathe, nor any other constitutional signs nor symptoms.    Past Medical History:   Diagnosis Date   • Acid reflux disease    • Ankle pain, right    • Anxiety    • Arthritis    • Asthma    • Callus    • Fallen arches    • Heart disease    • Heart disease    • Hemorrhoids    • High blood pressure    • Hypertension    • Ingrown toenail    • Plantar warts    • Rectal bleeding    • Sleep apnea    • Tinnitus      Past Surgical History:   Procedure Laterality Date   • COLONOSCOPY     • DENTAL PROCEDURE     • FOOT SURGERY Bilateral      Family History   Problem Relation Age of Onset   • Prostate cancer Father    • Rashes / Skin problems Father    • Heart disease Father    • Cancer Father    • Skin cancer Father    • Rashes / Skin problems Mother    • Rheum arthritis Mother    • Heart disease Mother    • Osteoporosis Mother    • Arthritis Mother    •  Lupus Mother    • Hearing loss Mother    • Hashimoto's thyroiditis Sister    • Diabetes Neg Hx    • Stroke Neg Hx      Social History     Socioeconomic History   • Marital status:    Tobacco Use   • Smoking status: Never   • Smokeless tobacco: Never   Vaping Use   • Vaping Use: Never used   Substance and Sexual Activity   • Alcohol use: Yes     Alcohol/week: 1.0 standard drink     Types: 1 Glasses of wine per week     Comment: I only drink sporadically.   • Drug use: Never   • Sexual activity: Yes     Partners: Male     No Known Allergies  Current Outpatient Medications   Medication Sig Dispense Refill   • acetaminophen (TYLENOL) 500 MG tablet Take 2 tablets by mouth Every 6 (Six) Hours As Needed for Mild Pain (try to use this instead of meloxicam). 200 tablet 0   • apixaban (ELIQUIS) 5 MG tablet tablet Take 1 tablet by mouth 2 (Two) Times a Day.     • famotidine (PEPCID) 20 MG tablet Take 1 tablet by mouth Daily. daily     • fluocinolone acetonide (DermOtic) 0.01 % oil otic oil Use 5 drops bilateral ears twice daily for 3 weeks. 20 mL 2   • Glucos-Chondroit-Hyaluron-MSM (GLUCOSAMINE CHONDROITIN JOINT PO) Glucosamine Chondroitin Joint     • traZODone (DESYREL) 50 MG tablet Daily.     • metoprolol succinate XL (TOPROL-XL) 50 MG 24 hr tablet Take 1 tablet by mouth Every 12 (Twelve) Hours for 30 days. 60 tablet 0     Current Facility-Administered Medications   Medication Dose Route Frequency Provider Last Rate Last Admin   • bupivacaine (MARCAINE) 0.25 % injection 0.5 mL  0.5 mL Injection Once Brad Bernard DPM       • dexamethasone sodium phosphate injection 4 mg  4 mg Intra-articular Once Brad Bernard DPM         Review of Systems   Musculoskeletal:        Right lateral ankle     All other systems reviewed and are negative.      OBJECTIVE     Vitals:    06/16/23 0727   BP: 135/78   Pulse: 64   Temp: 98.6 °F (37 °C)   SpO2: 97%       WBC   Date Value Ref Range Status   04/29/2023 10.26 3.40  - 10.80 10*3/mm3 Final     RBC   Date Value Ref Range Status   04/29/2023 4.54 3.77 - 5.28 10*6/mm3 Final     Hemoglobin   Date Value Ref Range Status   04/29/2023 14.3 12.0 - 15.9 g/dL Final     Hematocrit   Date Value Ref Range Status   04/29/2023 42.0 34.0 - 46.6 % Final     MCV   Date Value Ref Range Status   04/29/2023 92.5 79.0 - 97.0 fL Final     MCH   Date Value Ref Range Status   04/29/2023 31.5 26.6 - 33.0 pg Final     MCHC   Date Value Ref Range Status   04/29/2023 34.0 31.5 - 35.7 g/dL Final     RDW   Date Value Ref Range Status   04/29/2023 13.2 12.3 - 15.4 % Final     RDW-SD   Date Value Ref Range Status   04/29/2023 44.6 37.0 - 54.0 fl Final     MPV   Date Value Ref Range Status   04/29/2023 8.5 6.0 - 12.0 fL Final     Platelets   Date Value Ref Range Status   04/29/2023 276 140 - 450 10*3/mm3 Final     Neutrophil %   Date Value Ref Range Status   04/29/2023 62.7 42.7 - 76.0 % Final     Lymphocyte %   Date Value Ref Range Status   04/29/2023 24.4 19.6 - 45.3 % Final     Monocyte %   Date Value Ref Range Status   04/29/2023 8.8 5.0 - 12.0 % Final     Eosinophil %   Date Value Ref Range Status   04/29/2023 3.1 0.3 - 6.2 % Final     Basophil %   Date Value Ref Range Status   04/29/2023 0.5 0.0 - 1.5 % Final     Immature Grans %   Date Value Ref Range Status   04/29/2023 0.5 0.0 - 0.5 % Final     Neutrophils, Absolute   Date Value Ref Range Status   04/29/2023 6.44 1.70 - 7.00 10*3/mm3 Final     Lymphocytes, Absolute   Date Value Ref Range Status   04/29/2023 2.50 0.70 - 3.10 10*3/mm3 Final     Monocytes, Absolute   Date Value Ref Range Status   04/29/2023 0.90 0.10 - 0.90 10*3/mm3 Final     Eosinophils, Absolute   Date Value Ref Range Status   04/29/2023 0.32 0.00 - 0.40 10*3/mm3 Final     Basophils, Absolute   Date Value Ref Range Status   04/29/2023 0.05 0.00 - 0.20 10*3/mm3 Final     Immature Grans, Absolute   Date Value Ref Range Status   04/29/2023 0.05 0.00 - 0.05 10*3/mm3 Final     nRBC   Date  Value Ref Range Status   04/29/2023 0.0 0.0 - 0.2 /100 WBC Final         Lab Results   Component Value Date    GLUCOSE 94 04/29/2023    BUN 10 04/29/2023    CREATININE 0.64 04/29/2023    BCR 15.6 04/29/2023    K 3.6 04/29/2023    CO2 20.3 (L) 04/29/2023    CALCIUM 8.4 (L) 04/29/2023    ALBUMIN 3.2 (L) 04/29/2023    LABIL2 1.3 (L) 04/30/2021    AST 14 04/29/2023    ALT 15 04/29/2023       Patient seen in no apparent distress.      PHYSICAL EXAM:     Foot/Ankle Exam    GENERAL  Appearance:  (Morbidly obese)  Orientation:  AAOx3  Affect:  appropriate  Gait:  unimpaired  Assistance:  independent  Right shoe gear: casual shoe  Left shoe gear: casual shoe    VASCULAR     Right Foot Vascularity   Normal vascular exam    Dorsalis pedis:  2+  Posterior tibial:  2+  Skin temperature:  warm  Edema grading:  None  CFT:  < 3 seconds  Pedal hair growth:  Present  Varicosities:  none     Left Foot Vascularity   Normal vascular exam    Dorsalis pedis:  2+  Posterior tibial:  2+  Skin temperature:  warm  Edema grading:  None  CFT:  < 3 seconds  Pedal hair growth:  Present  Varicosities:  none     NEUROLOGIC     Right Foot Neurologic   Normal sensation    Light touch sensation: normal  Vibratory sensation: normal  Hot/Cold sensation: normal     Left Foot Neurologic   Normal sensation    Light touch sensation: normal  Vibratory sensation: normal  Hot/Cold sensation:  normal    MUSCULOSKELETAL     Right Foot Musculoskeletal   Ecchymosis:  none  Tenderness:  (Sinus tarsi area)  Arch:  Pes planus     Left Foot Musculoskeletal   Arch:  Pes planus    MUSCLE STRENGTH     Right Foot Muscle Strength   Foot dorsiflexion:  4  Foot plantar flexion:  4  Foot inversion:  4  Foot eversion:  4     Left Foot Muscle Strength   Foot dorsiflexion:  4  Foot plantar flexion:  4  Foot inversion:  4  Foot eversion:  4    RANGE OF MOTION     Right Foot Range of Motion   Foot and ankle ROM within normal limits       Left Foot Range of Motion   Foot and ankle  ROM within normal limits      DERMATOLOGIC      Right Foot Dermatologic   Skin  Right foot skin is intact.      Left Foot Dermatologic   Skin  Left foot skin is intact.     XR Ankle 3+ View Right    Result Date: 6/16/2023  Narrative: IN-OFFICE IMAGING:  Standing, weightbearing, 3 view, AP, MO, Lateral, right ankle Indication: Ankle pain Findings: Significant pes planus deformity with anterior cyma line break seen and decreased calcaneal inclination angle seen.  Posterior inferior calcaneal enthesopathy seen.  Congruent ankle mortise joint seen.  No periosteal reactions nor osteolytic changes seen.  No occult fractures seen. Comparison: No comparison views available.       ASSESSMENT/PLAN     Diagnoses and all orders for this visit:    1. Foot pain, right (Primary)    2. Sinus tarsi syndrome of right ankle  -     bupivacaine (MARCAINE) 0.25 % injection 0.5 mL  -     dexamethasone sodium phosphate injection 4 mg    Injection  Date/Time: 06/16/2023  Performed by: Brad Bernard DPM  Authorized by: Brad Bernard DPM     Consent: Verbal consent obtained. Written consent obtained.  Risks and benefits: risks, benefits and alternatives were discussed  Consent given by: patient  Patient identity confirmed: verbally with patient  Indications: pain relief    Betadine prep x 3 to the planned injection site.    Injection site: Sinus tarsi of right ankle    Sedation:  Patient sedated: no    Patient position: sitting  Needle size: 27 G  Local anesthetic: 1.0 mL 0.25% Marcaine plain and 1.0 mL Decadron 4 mg/mL.   Outcome: pain improved  Patient tolerance: Patient tolerated the procedure well with no immediate complications      Comprehensive lower extremity examination and evaluation was performed.    Discussed findings and treatment plan including risks, benefits, and treatment options with patient in detail. Patient agreed with treatment plan.    Medications and allergies reviewed.  Reviewed available lab values along  with other pertinent labs.  These were discussed with the patient.    Patient is to monitor for recurrence and any new symptoms and to contact Dr. Bernard's office for a follow-up appointment.      The patient states understanding and agreement with this plan.    An After Visit Summary was printed and given to the patient at discharge, including (if requested) any available informative/educational handouts regarding diagnosis, treatment, or medications. All questions were answered to patient/family satisfaction. Should symptoms fail to improve or worsen they agree to call or return to clinic or to go to the Emergency Department. Discussed the importance of following up with any needed screening tests/labs/specialist appointments and any requested follow-up recommended by me today. Importance of maintaining follow-up discussed and patient accepts that missed appointments can delay diagnosis and potentially lead to worsening of conditions.    Return if symptoms worsen or fail to improve., or sooner if acute issues arise.    This document has been electronically signed by Brad Bernard DPM on June 16, 2023 08:04 EDT

## 2023-09-08 ENCOUNTER — HOSPITAL ENCOUNTER (OUTPATIENT)
Dept: MAMMOGRAPHY | Facility: HOSPITAL | Age: 53
Discharge: HOME | End: 2023-09-08
Admitting: NURSE PRACTITIONER
Payer: COMMERCIAL

## 2023-09-08 DIAGNOSIS — Z01.419 ENCOUNTER FOR GYNECOLOGICAL EXAMINATION WITHOUT ABNORMAL FINDING: ICD-10-CM

## 2023-09-08 PROCEDURE — 77063 BREAST TOMOSYNTHESIS BI: CPT

## 2023-09-08 PROCEDURE — 77067 SCR MAMMO BI INCL CAD: CPT

## 2023-09-29 ENCOUNTER — OFFICE VISIT (OUTPATIENT)
Dept: FAMILY MEDICINE CLINIC | Facility: CLINIC | Age: 53
End: 2023-09-29
Payer: COMMERCIAL

## 2023-09-29 VITALS
TEMPERATURE: 97.8 F | HEART RATE: 83 BPM | HEIGHT: 67 IN | DIASTOLIC BLOOD PRESSURE: 92 MMHG | OXYGEN SATURATION: 96 % | BODY MASS INDEX: 45.99 KG/M2 | SYSTOLIC BLOOD PRESSURE: 142 MMHG | WEIGHT: 293 LBS

## 2023-09-29 DIAGNOSIS — E55.9 VITAMIN D DEFICIENCY: ICD-10-CM

## 2023-09-29 DIAGNOSIS — E66.01 CLASS 3 SEVERE OBESITY DUE TO EXCESS CALORIES WITH SERIOUS COMORBIDITY AND BODY MASS INDEX (BMI) OF 50.0 TO 59.9 IN ADULT: ICD-10-CM

## 2023-09-29 DIAGNOSIS — Z00.00 ANNUAL PHYSICAL EXAM: Primary | ICD-10-CM

## 2023-09-29 DIAGNOSIS — Z13.29 THYROID DISORDER SCREENING: ICD-10-CM

## 2023-09-29 DIAGNOSIS — I48.0 PAROXYSMAL ATRIAL FIBRILLATION: ICD-10-CM

## 2023-09-29 DIAGNOSIS — Z13.220 LIPID SCREENING: ICD-10-CM

## 2023-09-29 DIAGNOSIS — R73.01 IFG (IMPAIRED FASTING GLUCOSE): ICD-10-CM

## 2023-09-29 DIAGNOSIS — I10 PRIMARY HYPERTENSION: ICD-10-CM

## 2023-09-29 LAB
25(OH)D3 SERPL-MCNC: 22.6 NG/ML (ref 30–100)
ALBUMIN SERPL-MCNC: 4.1 G/DL (ref 3.5–5.2)
ALBUMIN/GLOB SERPL: 1.4 G/DL
ALP SERPL-CCNC: 82 U/L (ref 39–117)
ALT SERPL W P-5'-P-CCNC: 18 U/L (ref 1–33)
ANION GAP SERPL CALCULATED.3IONS-SCNC: 7.4 MMOL/L (ref 5–15)
AST SERPL-CCNC: 19 U/L (ref 1–32)
BASOPHILS # BLD AUTO: 0.05 10*3/MM3 (ref 0–0.2)
BASOPHILS NFR BLD AUTO: 0.6 % (ref 0–1.5)
BILIRUB SERPL-MCNC: <0.2 MG/DL (ref 0–1.2)
BUN SERPL-MCNC: 12 MG/DL (ref 6–20)
BUN/CREAT SERPL: 12.9 (ref 7–25)
CALCIUM SPEC-SCNC: 9.3 MG/DL (ref 8.6–10.5)
CHLORIDE SERPL-SCNC: 106 MMOL/L (ref 98–107)
CHOLEST SERPL-MCNC: 218 MG/DL (ref 0–200)
CO2 SERPL-SCNC: 28.6 MMOL/L (ref 22–29)
CREAT SERPL-MCNC: 0.93 MG/DL (ref 0.57–1)
DEPRECATED RDW RBC AUTO: 44.4 FL (ref 37–54)
EGFRCR SERPLBLD CKD-EPI 2021: 74.1 ML/MIN/1.73
EOSINOPHIL # BLD AUTO: 0.18 10*3/MM3 (ref 0–0.4)
EOSINOPHIL NFR BLD AUTO: 2.1 % (ref 0.3–6.2)
ERYTHROCYTE [DISTWIDTH] IN BLOOD BY AUTOMATED COUNT: 13.2 % (ref 12.3–15.4)
GLOBULIN UR ELPH-MCNC: 2.9 GM/DL
GLUCOSE SERPL-MCNC: 110 MG/DL (ref 65–99)
HBA1C MFR BLD: 6.4 % (ref 4.8–5.6)
HCT VFR BLD AUTO: 44.2 % (ref 34–46.6)
HDLC SERPL-MCNC: 45 MG/DL (ref 40–60)
HGB BLD-MCNC: 14.8 G/DL (ref 12–15.9)
IMM GRANULOCYTES # BLD AUTO: 0.04 10*3/MM3 (ref 0–0.05)
IMM GRANULOCYTES NFR BLD AUTO: 0.5 % (ref 0–0.5)
LDLC SERPL CALC-MCNC: 126 MG/DL (ref 0–100)
LDLC/HDLC SERPL: 2.68 {RATIO}
LYMPHOCYTES # BLD AUTO: 2.12 10*3/MM3 (ref 0.7–3.1)
LYMPHOCYTES NFR BLD AUTO: 24.7 % (ref 19.6–45.3)
MCH RBC QN AUTO: 31.3 PG (ref 26.6–33)
MCHC RBC AUTO-ENTMCNC: 33.5 G/DL (ref 31.5–35.7)
MCV RBC AUTO: 93.4 FL (ref 79–97)
MONOCYTES # BLD AUTO: 0.82 10*3/MM3 (ref 0.1–0.9)
MONOCYTES NFR BLD AUTO: 9.5 % (ref 5–12)
NEUTROPHILS NFR BLD AUTO: 5.38 10*3/MM3 (ref 1.7–7)
NEUTROPHILS NFR BLD AUTO: 62.6 % (ref 42.7–76)
NRBC BLD AUTO-RTO: 0 /100 WBC (ref 0–0.2)
PLATELET # BLD AUTO: 289 10*3/MM3 (ref 140–450)
PMV BLD AUTO: 9.4 FL (ref 6–12)
POTASSIUM SERPL-SCNC: 4.2 MMOL/L (ref 3.5–5.2)
PROT SERPL-MCNC: 7 G/DL (ref 6–8.5)
RBC # BLD AUTO: 4.73 10*6/MM3 (ref 3.77–5.28)
SODIUM SERPL-SCNC: 142 MMOL/L (ref 136–145)
TRIGL SERPL-MCNC: 263 MG/DL (ref 0–150)
TSH SERPL DL<=0.05 MIU/L-ACNC: 2.02 UIU/ML (ref 0.27–4.2)
VLDLC SERPL-MCNC: 47 MG/DL (ref 5–40)
WBC NRBC COR # BLD: 8.59 10*3/MM3 (ref 3.4–10.8)

## 2023-09-29 PROCEDURE — 82306 VITAMIN D 25 HYDROXY: CPT | Performed by: NURSE PRACTITIONER

## 2023-09-29 PROCEDURE — 80050 GENERAL HEALTH PANEL: CPT | Performed by: NURSE PRACTITIONER

## 2023-09-29 PROCEDURE — 80061 LIPID PANEL: CPT | Performed by: NURSE PRACTITIONER

## 2023-09-29 PROCEDURE — 83036 HEMOGLOBIN GLYCOSYLATED A1C: CPT | Performed by: NURSE PRACTITIONER

## 2023-09-29 RX ORDER — ONDANSETRON 4 MG/1
4 TABLET, ORALLY DISINTEGRATING ORAL EVERY 8 HOURS PRN
Qty: 30 TABLET | Refills: 2 | Status: SHIPPED | OUTPATIENT
Start: 2023-09-29

## 2023-09-29 NOTE — PROGRESS NOTES
"  ENCOUNTER DATE:  09/29/2023    Paola Sow / 52 y.o. / female      CHIEF COMPLAINT     Annual Exam      VITALS     Visit Vitals  /92 (BP Location: Left arm, Patient Position: Sitting, Cuff Size: Adult) Comment (BP Location): Lt wrist   Pulse 83   Temp 97.8 °F (36.6 °C) (Temporal)   Ht 170.2 cm (67\")   Wt (!) 152 kg (336 lb)   SpO2 96%   BMI 52.63 kg/m²       BP Readings from Last 3 Encounters:   09/29/23 142/92   08/21/23 129/80   06/16/23 135/78     Wt Readings from Last 3 Encounters:   09/29/23 (!) 152 kg (336 lb)   08/21/23 (!) 151 kg (332 lb 3.2 oz)   06/16/23 (!) 152 kg (335 lb)      Body mass index is 52.63 kg/m².    MEDICATIONS     Current Outpatient Medications on File Prior to Visit   Medication Sig Dispense Refill    apixaban (ELIQUIS) 5 MG tablet tablet Take 1 tablet by mouth 2 (Two) Times a Day.      famotidine (PEPCID) 20 MG tablet Take 1 tablet by mouth Daily. daily      fluocinolone acetonide (DermOtic) 0.01 % oil otic oil Use 5 drops bilateral ears twice daily for 3 weeks. 20 mL 2    Glucos-Chondroit-Hyaluron-MSM (GLUCOSAMINE CHONDROITIN JOINT PO) Glucosamine Chondroitin Joint      traZODone (DESYREL) 50 MG tablet Take 0.5 tablets by mouth At Night As Needed.      metoprolol succinate XL (TOPROL-XL) 50 MG 24 hr tablet Take 1 tablet by mouth Every 12 (Twelve) Hours for 30 days. 60 tablet 0    [DISCONTINUED] benzonatate (TESSALON) 200 MG capsule Take 1 capsule by mouth 3 (Three) Times a Day As Needed for Cough. (Patient not taking: Reported on 9/29/2023) 30 capsule 0     No current facility-administered medications on file prior to visit.         HISTORY OF PRESENT ILLNESS     Paola presents for annual health maintenance visit.  Lab Results   Component Value Date    HPVAPTIMA Negative 05/26/2023      Last health maintenance visit: approximately 1 year ago  General health: some medical problems  Lifestyle:  Attempting to lose weight?: Yes   Diet: eats moderately healthy  Exercise: generally " stays active (but no regular exercise)  Tobacco: Never used   Alcohol: does not drink  Work: Full-time  Reproductive health:  Sexually active?: Yes   Sexual problems?: No problems  Concern for STD?: No    Sees Gynecologist?: No   Aileen/Postmenopausal?: Yes   Domestic abuse concerns: No   Depression Screening:          PHQ-9 Depression Screening  Little interest or pleasure in doing things? 0-->not at all   Feeling down, depressed, or hopeless? 0-->not at all   Trouble falling or staying asleep, or sleeping too much?     Feeling tired or having little energy?     Poor appetite or overeating?     Feeling bad about yourself - or that you are a failure or have let yourself or your family down?     Trouble concentrating on things, such as reading the newspaper or watching television?     Moving or speaking so slowly that other people could have noticed? Or the opposite - being so fidgety or restless that you have been moving around a lot more than usual?     Thoughts that you would be better off dead, or of hurting yourself in some way?     PHQ-9 Total Score 0   If you checked off any problems, how difficult have these problems made it for you to do your work, take care of things at home, or get along with other people?           SHARMAINE-7           Patient Care Team:  Laurence Jacob APRN as PCP - General (Nurse Practitioner)  Lisseth Andrews APRN as Nurse Practitioner (Otolaryngology)      ALLERGIES  No Known Allergies     PFSH:     The following portions of the patient's history were reviewed and updated as appropriate: Allergies / Current Medications / Past Medical History / Surgical History / Social History / Family History    PROBLEM LIST   Patient Active Problem List   Diagnosis    Right shoulder tendinitis    Right carpal tunnel syndrome    Impingement syndrome of right shoulder    Arthritis of carpometacarpal (CMC) joint of right thumb    Abnormal facial hair    Perimenopausal vasomotor symptoms    Atrial  fibrillation with RVR       PAST MEDICAL HISTORY  Past Medical History:   Diagnosis Date    Acid reflux disease     Ankle pain, right     Anxiety     Arthritis     Asthma     Callus     Fallen arches     Heart disease     Heart disease     Hemorrhoids     High blood pressure     Hypertension     Ingrown toenail     Plantar warts     Rectal bleeding     Sleep apnea     Tinnitus        SURGICAL HISTORY  Past Surgical History:   Procedure Laterality Date    COLONOSCOPY      DENTAL PROCEDURE      FOOT SURGERY Bilateral        SOCIAL HISTORY  Social History     Socioeconomic History    Marital status:    Tobacco Use    Smoking status: Never    Smokeless tobacco: Never   Vaping Use    Vaping Use: Never used   Substance and Sexual Activity    Alcohol use: Yes     Alcohol/week: 1.0 standard drink     Types: 1 Glasses of wine per week     Comment: I only drink sporadically.    Drug use: Never    Sexual activity: Yes     Partners: Male       FAMILY HISTORY  Family History   Problem Relation Age of Onset    Rashes / Skin problems Mother     Rheum arthritis Mother     Heart disease Mother     Osteoporosis Mother     Arthritis Mother     Lupus Mother     Hearing loss Mother     Prostate cancer Father     Rashes / Skin problems Father     Heart disease Father     Cancer Father     Skin cancer Father     Hashimoto's thyroiditis Sister     Diabetes Neg Hx     Stroke Neg Hx        IMMUNIZATION HISTORY  Immunization History   Administered Date(s) Administered    COVID-19 (JAMAAL) 03/11/2021    COVID-19 (MODERNA) 1st,2nd,3rd Dose Monovalent 10/23/2021    COVID-19 (MODERNA) BIVALENT 12+YRS 11/11/2022    Flu Vaccine Intradermal Quad 18-64YR 10/01/2021    Influenza, Unspecified 10/01/2019, 10/09/2020    Pneumococcal Conjugate 20-Valent (PCV20) 05/24/2022    Shingrix 09/14/2022, 01/22/2023       HPI  HPI    Hyperlipidemia  This is a chronic problem. The current episode started more than 1 year ago. Recent lipid tests were reviewed  and are variable. Exacerbating diseases include obesity. Pertinent negatives include no shortness of breath. Current antihyperlipidemic treatment includes diet change.  Hypertension  This is a chronic problem. The current episode started more than 1 year ago. The problem is controlled. Pertinent negatives include no anxiety, headaches, peripheral edema or shortness of breath. Current antihypertension treatment includes beta blockers and diuretics. The current treatment provides significant improvement. There are no compliance problems.      Patient has paroxysmal A-fib, she sees Dr. Frank Boykin, she is currently on Eliquis and metoprolol, she states that it is stable at this time, denies any dizziness, chest pain or heart palpitations.    Patient's last A1c was elevated at 5.8, this was about a year ago.  She has continued with weight BMI of 52.63 she is morbidly obese, patient is wanting some medication to help lose weight, she does suffer from polyarthralgia and would benefit from weight loss.  Unfortunately Wegovy has been on backorder, I did do a trial of Saxenda with patient to follow-up in office in 2 months.  I discussed side effects that could occur with the Saxenda.  Patient verbalized understanding.    Physical Exam  Vitals reviewed.   Constitutional:       Appearance: Normal appearance. She is well-developed. She is morbidly obese.   HENT:      Head: Normocephalic and atraumatic.   Eyes:      Conjunctiva/sclera: Conjunctivae normal.      Pupils: Pupils are equal, round, and reactive to light.   Cardiovascular:      Rate and Rhythm: Normal rate and regular rhythm.      Heart sounds: No murmur heard.  Pulmonary:      Effort: Pulmonary effort is normal.      Breath sounds: Normal breath sounds. No wheezing or rhonchi.   Skin:     General: Skin is warm and dry.   Neurological:      Mental Status: She is alert and oriented to person, place, and time.   Psychiatric:         Mood and Affect: Mood and affect  normal.         Behavior: Behavior normal.         Thought Content: Thought content normal.         Judgment: Judgment normal.       REVIEWED DATA      Labs:    Lab Results   Component Value Date     04/29/2023    K 3.6 04/29/2023    CALCIUM 8.4 (L) 04/29/2023    AST 14 04/29/2023    ALT 15 04/29/2023    BUN 10 04/29/2023    CREATININE 0.64 04/29/2023    CREATININE 0.63 04/28/2023    CREATININE 0.66 04/28/2023       Lab Results   Component Value Date    HGBA1C 5.70 (H) 01/09/2023    HGBA1C 5.80 (H) 08/30/2022    HGBA1C 5.4 04/30/2021    TSH 3.640 04/28/2023    FREET4 1.07 04/28/2023       Lab Results   Component Value Date     (H) 08/05/2022    HDL 62 (H) 08/05/2022    TRIG 111 08/05/2022    CHOLHDLRATIO 3.2 04/30/2021       Lab Results   Component Value Date    IQKZ50FR 36.5 09/19/2022        Lab Results   Component Value Date    WBC 10.26 04/29/2023    HGB 14.3 04/29/2023    MCV 92.5 04/29/2023     04/29/2023       No results found for: PROTEIN, GLUCOSEU, BLOODU, NITRITEU, LEUKOCYTESUR       Lab Results   Component Value Date    HEPCVIRUSABY Non-Reactive 08/05/2022           ASSESSMENT & PLAN     Diagnoses and all orders for this visit:    1. Annual physical exam (Primary)    2. Lipid screening  -     CBC & Differential  -     Comprehensive Metabolic Panel  -     Lipid Panel    3. Primary hypertension  -     CBC & Differential  -     Comprehensive Metabolic Panel  -     Liraglutide (SAXENDA) 18 MG/3ML injection pen; Inject 0.6 mg under the skin into the appropriate area as directed Daily for 14 days, THEN 1.2 mg Daily for 14 days, THEN 1.8 mg Daily for 14 days, THEN 2.4 mg Daily for 14 days.  Dispense: 14 mL; Refill: 1    4. Vitamin D deficiency  -     Vitamin D,25-Hydroxy    5. Paroxysmal atrial fibrillation  -     Liraglutide (SAXENDA) 18 MG/3ML injection pen; Inject 0.6 mg under the skin into the appropriate area as directed Daily for 14 days, THEN 1.2 mg Daily for 14 days, THEN 1.8 mg Daily  for 14 days, THEN 2.4 mg Daily for 14 days.  Dispense: 14 mL; Refill: 1    6. Thyroid disorder screening  -     TSH    7. Class 3 severe obesity due to excess calories with serious comorbidity and body mass index (BMI) of 50.0 to 59.9 in adult  -     Liraglutide (SAXENDA) 18 MG/3ML injection pen; Inject 0.6 mg under the skin into the appropriate area as directed Daily for 14 days, THEN 1.2 mg Daily for 14 days, THEN 1.8 mg Daily for 14 days, THEN 2.4 mg Daily for 14 days.  Dispense: 14 mL; Refill: 1    8. IFG (impaired fasting glucose)  -     Hemoglobin A1c  -     Liraglutide (SAXENDA) 18 MG/3ML injection pen; Inject 0.6 mg under the skin into the appropriate area as directed Daily for 14 days, THEN 1.2 mg Daily for 14 days, THEN 1.8 mg Daily for 14 days, THEN 2.4 mg Daily for 14 days.  Dispense: 14 mL; Refill: 1    Other orders  -     ondansetron ODT (ZOFRAN-ODT) 4 MG disintegrating tablet; Place 1 tablet on the tongue Every 8 (Eight) Hours As Needed for Nausea or Vomiting.  Dispense: 30 tablet; Refill: 2        HEALTHCARE MAINTENANCE ISSUES     Cancer Screening:  Colon: Initial/Next screening at age: CURRENT  Repeat colon cancer screening: every 3 years  Breast: Recommended monthly self exams; annual professional exam  Mammogram: every 1 year  Cervical: 3 years  Skin: Monthly self skin examination, annual exam by health professional      Lifestyle Counseling:  Lifestyle Modifications: Attempt to lose weight, Continue good lifestyle choices/modifications, and Improve dietary compliance  Safety Issues: Always wear seatbelt, Avoid texting while driving   Use sunscreen, regular skin examination  Recommended annual dental/vision examination.  Emotional/Stress/Sleep: Reviewed and  given when appropriate    Part of this note may be electronic transcription/translation of spoken language to printed text using the Dragon dictation system

## 2023-10-02 RX ORDER — ATORVASTATIN CALCIUM 10 MG/1
10 TABLET, FILM COATED ORAL DAILY
Qty: 90 TABLET | Refills: 1 | Status: SHIPPED | OUTPATIENT
Start: 2023-10-02

## 2023-10-05 RX ORDER — ERGOCALCIFEROL 1.25 MG/1
50000 CAPSULE ORAL WEEKLY
Qty: 13 CAPSULE | Refills: 1 | Status: SHIPPED | OUTPATIENT
Start: 2023-10-05

## 2024-01-09 ENCOUNTER — PATIENT MESSAGE (OUTPATIENT)
Dept: FAMILY MEDICINE CLINIC | Facility: CLINIC | Age: 54
End: 2024-01-09
Payer: COMMERCIAL

## 2024-01-19 ENCOUNTER — OFFICE VISIT (OUTPATIENT)
Dept: FAMILY MEDICINE CLINIC | Facility: CLINIC | Age: 54
End: 2024-01-19
Payer: COMMERCIAL

## 2024-01-19 VITALS
HEIGHT: 67 IN | TEMPERATURE: 98.7 F | OXYGEN SATURATION: 98 % | HEART RATE: 78 BPM | DIASTOLIC BLOOD PRESSURE: 84 MMHG | WEIGHT: 293 LBS | BODY MASS INDEX: 45.99 KG/M2 | SYSTOLIC BLOOD PRESSURE: 130 MMHG

## 2024-01-19 DIAGNOSIS — E11.59 TYPE 2 DIABETES MELLITUS WITH OTHER CIRCULATORY COMPLICATION, WITHOUT LONG-TERM CURRENT USE OF INSULIN: Primary | ICD-10-CM

## 2024-01-19 DIAGNOSIS — I10 PRIMARY HYPERTENSION: ICD-10-CM

## 2024-01-19 DIAGNOSIS — E66.01 CLASS 3 SEVERE OBESITY DUE TO EXCESS CALORIES WITH SERIOUS COMORBIDITY AND BODY MASS INDEX (BMI) OF 50.0 TO 59.9 IN ADULT: ICD-10-CM

## 2024-01-19 DIAGNOSIS — E55.9 VITAMIN D DEFICIENCY: ICD-10-CM

## 2024-01-19 DIAGNOSIS — E78.2 MIXED HYPERLIPIDEMIA: ICD-10-CM

## 2024-01-19 PROCEDURE — 99214 OFFICE O/P EST MOD 30 MIN: CPT | Performed by: NURSE PRACTITIONER

## 2024-01-19 NOTE — PROGRESS NOTES
Chief Complaint  Labs Only and Med Management (Discuss alternatives for Saxenda)    Subjective        Medical History: has a past medical history of Acid reflux disease, Ankle pain, right, Anxiety, Arthritis, Asthma, Callus, Fallen arches, Heart disease, Heart disease, Hemorrhoids, High blood pressure, Hypertension, Ingrown toenail, Plantar warts, Rectal bleeding, Sleep apnea, and Tinnitus.     Surgical History: has a past surgical history that includes Dental surgery; Foot surgery (Bilateral); and Colonoscopy.     Family History: family history includes Arthritis in her mother; Cancer in her father; Hashimoto's thyroiditis in her sister; Hearing loss in her mother; Heart disease in her father and mother; Lupus in her mother; Osteoporosis in her mother; Prostate cancer in her father; Rashes / Skin problems in her father and mother; Rheum arthritis in her mother; Skin cancer in her father.     Social History: reports that she has never smoked. She has never used smokeless tobacco. She reports current alcohol use of about 1.0 standard drink of alcohol per week. She reports that she does not use drugs.    Paola Sow presents to [facility]  Diabetes  She presents for her initial diabetic visit. She has type 2 diabetes mellitus. Her disease course has been stable. There are no hypoglycemic associated symptoms. There are no diabetic associated symptoms. There are no hypoglycemic complications. There are no diabetic complications. Risk factors for coronary artery disease include diabetes mellitus, dyslipidemia, hypertension, obesity and post-menopausal. When asked about current treatments, none were reported. Her weight is stable. When asked about meal planning, she reported none. An ACE inhibitor/angiotensin II receptor blocker is not being taken.   Hyperlipidemia  This is a chronic problem. The current episode started more than 1 year ago. Recent lipid tests were reviewed and are  "variable. Exacerbating diseases include obesity. Pertinent negatives include no shortness of breath. Current antihyperlipidemic treatment includes diet change.  Hypertension  This is a chronic problem. The current episode started more than 1 year ago. The problem is controlled. Pertinent negatives include no anxiety, headaches, peripheral edema or shortness of breath. Current antihypertension treatment includes beta blockers and diuretics. The current treatment provides significant improvement. There are no compliance problems.      Objective   Vital Signs:   /84 (BP Location: Right arm, Patient Position: Sitting, Cuff Size: Adult)   Pulse 78   Temp 98.7 °F (37.1 °C) (Oral)   Ht 170.2 cm (67\")   Wt (!) 146 kg (322 lb)   SpO2 98%   BMI 50.43 kg/m²       Wt Readings from Last 3 Encounters:   01/19/24 (!) 146 kg (322 lb)   09/29/23 (!) 152 kg (336 lb)   08/21/23 (!) 151 kg (332 lb 3.2 oz)        BP Readings from Last 3 Encounters:   01/19/24 130/84   09/29/23 142/92   08/21/23 129/80                 Physical Exam  Vitals reviewed.   Constitutional:       General: She is not in acute distress.     Appearance: Normal appearance. She is well-developed. She is morbidly obese. She is not ill-appearing.   HENT:      Head: Normocephalic and atraumatic.      Left Ear: External ear normal.   Eyes:      Conjunctiva/sclera: Conjunctivae normal.      Pupils: Pupils are equal, round, and reactive to light.   Cardiovascular:      Rate and Rhythm: Normal rate and regular rhythm.      Heart sounds: No murmur heard.  Pulmonary:      Effort: Pulmonary effort is normal.      Breath sounds: Normal breath sounds. No wheezing or rhonchi.   Musculoskeletal:      Right lower leg: No edema.      Left lower leg: No edema.   Skin:     General: Skin is warm and dry.   Neurological:      Mental Status: She is alert and oriented to person, place, and time.   Psychiatric:         Mood and Affect: Mood and affect normal.         Behavior: " Behavior normal.         Thought Content: Thought content normal.         Judgment: Judgment normal.        Result Review :  {The following data was reviewed by DEWEY Stuart on 01/19/2024.  Common labs          4/28/2023    02:33 4/28/2023    05:42 4/29/2023    03:59 9/29/2023    15:45   Common Labs   Glucose 136  126  94  110    BUN 9  8  10  12    Creatinine 0.66  0.63  0.64  0.93    Sodium 139  137  137  142    Potassium 3.8  4.3  3.6  4.2    Chloride 103  103  105  106    Calcium 9.3  9.3  8.4  9.3    Albumin 3.7   3.2  4.1    Total Bilirubin 0.3   0.4  <0.2    Alkaline Phosphatase 87   73  82    AST (SGOT) 19   14  19    ALT (SGPT) 19   15  18    WBC 9.15  10.30  10.26  8.59    Hemoglobin 15.7  15.8  14.3  14.8    Hematocrit 44.2  45.6  42.0  44.2    Platelets 280  305  276  289    Total Cholesterol    218    Triglycerides    263    HDL Cholesterol    45    LDL Cholesterol     126    Hemoglobin A1C    6.40                  Current Outpatient Medications on File Prior to Visit   Medication Sig Dispense Refill    apixaban (ELIQUIS) 5 MG tablet tablet Take 1 tablet by mouth 2 (Two) Times a Day.      atorvastatin (LIPITOR) 10 MG tablet Take 1 tablet by mouth Daily. 90 tablet 1    famotidine (PEPCID) 20 MG tablet Take 1 tablet by mouth Daily. daily      fluocinolone acetonide (DermOtic) 0.01 % oil otic oil Use 5 drops bilateral ears twice daily for 3 weeks. 20 mL 2    Glucos-Chondroit-Hyaluron-MSM (GLUCOSAMINE CHONDROITIN JOINT PO) Glucosamine Chondroitin Joint      traZODone (DESYREL) 50 MG tablet Take 0.5 tablets by mouth At Night As Needed.      metoprolol succinate XL (TOPROL-XL) 50 MG 24 hr tablet Take 1 tablet by mouth Every 12 (Twelve) Hours for 30 days. 60 tablet 0    ondansetron ODT (ZOFRAN-ODT) 4 MG disintegrating tablet Place 1 tablet on the tongue Every 8 (Eight) Hours As Needed for Nausea or Vomiting. (Patient not taking: Reported on 1/19/2024) 30 tablet 2    vitamin D (ERGOCALCIFEROL)  1.25 MG (25046 UT) capsule capsule Take 1 capsule by mouth 1 (One) Time Per Week. 13 capsule 1     No current facility-administered medications on file prior to visit.        Assessment and Plan  Diagnoses and all orders for this visit:    1. Type 2 diabetes mellitus with other circulatory complication, without long-term current use of insulin (Primary)  -     Cancel: Hemoglobin A1c  -     Hemoglobin A1c; Future  -     Tirzepatide (MOUNJARO) 2.5 MG/0.5ML solution pen-injector pen; Inject 0.5 mL under the skin into the appropriate area as directed 1 (One) Time Per Week. Starting month  Dispense: 2 mL; Refill: 0  -     Tirzepatide (MOUNJARO) 5 MG/0.5ML solution pen-injector pen; Inject 0.5 mL under the skin into the appropriate area as directed 1 (One) Time Per Week. Continuation dose  Dispense: 2 mL; Refill: 2    2. Class 3 severe obesity due to excess calories with serious comorbidity and body mass index (BMI) of 50.0 to 59.9 in adult  -     Tirzepatide (MOUNJARO) 2.5 MG/0.5ML solution pen-injector pen; Inject 0.5 mL under the skin into the appropriate area as directed 1 (One) Time Per Week. Starting month  Dispense: 2 mL; Refill: 0  -     Tirzepatide (MOUNJARO) 5 MG/0.5ML solution pen-injector pen; Inject 0.5 mL under the skin into the appropriate area as directed 1 (One) Time Per Week. Continuation dose  Dispense: 2 mL; Refill: 2    3. Mixed hyperlipidemia  -     Cancel: CBC & Differential  -     Cancel: Comprehensive Metabolic Panel  -     Cancel: Lipid Panel  -     CBC & Differential; Future  -     Comprehensive Metabolic Panel; Future  -     Lipid Panel; Future    4. Primary hypertension    5. Vitamin D deficiency  -     Cancel: Vitamin D,25-Hydroxy  -     Vitamin D,25-Hydroxy; Future    Will start patient on Mounjaro for type 2 diabetes, last A1c was 6.4, will start on 2.5 mg titrate up to 5 mg.  Follow-up in 3 months.  Patient shown how to use medication apparatus in office and instructed to keep medications  in the future regular.  Will recheck lipid panel, blood pressure stable at 130/84 today denies any headache, chest pain or change in vision.  Will recheck vitamin D adjust medication if needed.  Discussed return precautions.  Patient verbalized understanding.    Follow Up   Return in about 3 months (around 4/19/2024) for Recheck.  Patient was given instructions and counseling regarding her condition or for health maintenance advice. Please see specific information pulled into the AVS if appropriate.       Part of this note may be electronic transcription/translation of spoken language to printed text using the Dragon dictation system

## 2024-01-22 ENCOUNTER — CLINICAL SUPPORT (OUTPATIENT)
Dept: FAMILY MEDICINE CLINIC | Facility: CLINIC | Age: 54
End: 2024-01-22
Payer: COMMERCIAL

## 2024-01-22 DIAGNOSIS — E55.9 VITAMIN D DEFICIENCY: ICD-10-CM

## 2024-01-22 DIAGNOSIS — E78.2 MIXED HYPERLIPIDEMIA: ICD-10-CM

## 2024-01-22 DIAGNOSIS — E11.59 TYPE 2 DIABETES MELLITUS WITH OTHER CIRCULATORY COMPLICATION, WITHOUT LONG-TERM CURRENT USE OF INSULIN: ICD-10-CM

## 2024-01-22 LAB
25(OH)D3 SERPL-MCNC: 42.5 NG/ML (ref 30–100)
ALBUMIN SERPL-MCNC: 3.9 G/DL (ref 3.5–5.2)
ALBUMIN/GLOB SERPL: 1.4 G/DL
ALP SERPL-CCNC: 90 U/L (ref 39–117)
ALT SERPL W P-5'-P-CCNC: 21 U/L (ref 1–33)
ANION GAP SERPL CALCULATED.3IONS-SCNC: 11.3 MMOL/L (ref 5–15)
AST SERPL-CCNC: 22 U/L (ref 1–32)
BASOPHILS # BLD AUTO: 0.06 10*3/MM3 (ref 0–0.2)
BASOPHILS NFR BLD AUTO: 0.7 % (ref 0–1.5)
BILIRUB SERPL-MCNC: 0.3 MG/DL (ref 0–1.2)
BUN SERPL-MCNC: 12 MG/DL (ref 6–20)
BUN/CREAT SERPL: 15.8 (ref 7–25)
CALCIUM SPEC-SCNC: 9.7 MG/DL (ref 8.6–10.5)
CHLORIDE SERPL-SCNC: 104 MMOL/L (ref 98–107)
CHOLEST SERPL-MCNC: 166 MG/DL (ref 0–200)
CO2 SERPL-SCNC: 26.7 MMOL/L (ref 22–29)
CREAT SERPL-MCNC: 0.76 MG/DL (ref 0.57–1)
DEPRECATED RDW RBC AUTO: 41.9 FL (ref 37–54)
EGFRCR SERPLBLD CKD-EPI 2021: 93.8 ML/MIN/1.73
EOSINOPHIL # BLD AUTO: 0.31 10*3/MM3 (ref 0–0.4)
EOSINOPHIL NFR BLD AUTO: 3.7 % (ref 0.3–6.2)
ERYTHROCYTE [DISTWIDTH] IN BLOOD BY AUTOMATED COUNT: 12.5 % (ref 12.3–15.4)
GLOBULIN UR ELPH-MCNC: 2.8 GM/DL
GLUCOSE SERPL-MCNC: 105 MG/DL (ref 65–99)
HBA1C MFR BLD: 6 % (ref 4.8–5.6)
HCT VFR BLD AUTO: 43.8 % (ref 34–46.6)
HDLC SERPL-MCNC: 54 MG/DL (ref 40–60)
HGB BLD-MCNC: 14.9 G/DL (ref 12–15.9)
IMM GRANULOCYTES # BLD AUTO: 0.04 10*3/MM3 (ref 0–0.05)
IMM GRANULOCYTES NFR BLD AUTO: 0.5 % (ref 0–0.5)
LDLC SERPL CALC-MCNC: 94 MG/DL (ref 0–100)
LDLC/HDLC SERPL: 1.71 {RATIO}
LYMPHOCYTES # BLD AUTO: 1.78 10*3/MM3 (ref 0.7–3.1)
LYMPHOCYTES NFR BLD AUTO: 21.3 % (ref 19.6–45.3)
MCH RBC QN AUTO: 31.4 PG (ref 26.6–33)
MCHC RBC AUTO-ENTMCNC: 34 G/DL (ref 31.5–35.7)
MCV RBC AUTO: 92.4 FL (ref 79–97)
MONOCYTES # BLD AUTO: 0.72 10*3/MM3 (ref 0.1–0.9)
MONOCYTES NFR BLD AUTO: 8.6 % (ref 5–12)
NEUTROPHILS NFR BLD AUTO: 5.43 10*3/MM3 (ref 1.7–7)
NEUTROPHILS NFR BLD AUTO: 65.2 % (ref 42.7–76)
NRBC BLD AUTO-RTO: 0 /100 WBC (ref 0–0.2)
PLATELET # BLD AUTO: 277 10*3/MM3 (ref 140–450)
PMV BLD AUTO: 9.2 FL (ref 6–12)
POTASSIUM SERPL-SCNC: 4.2 MMOL/L (ref 3.5–5.2)
PROT SERPL-MCNC: 6.7 G/DL (ref 6–8.5)
RBC # BLD AUTO: 4.74 10*6/MM3 (ref 3.77–5.28)
SODIUM SERPL-SCNC: 142 MMOL/L (ref 136–145)
TRIGL SERPL-MCNC: 99 MG/DL (ref 0–150)
VLDLC SERPL-MCNC: 18 MG/DL (ref 5–40)
WBC NRBC COR # BLD AUTO: 8.34 10*3/MM3 (ref 3.4–10.8)

## 2024-01-22 PROCEDURE — 36415 COLL VENOUS BLD VENIPUNCTURE: CPT | Performed by: FAMILY MEDICINE

## 2024-01-22 PROCEDURE — 80061 LIPID PANEL: CPT | Performed by: NURSE PRACTITIONER

## 2024-01-22 PROCEDURE — 82306 VITAMIN D 25 HYDROXY: CPT | Performed by: NURSE PRACTITIONER

## 2024-01-22 PROCEDURE — 83036 HEMOGLOBIN GLYCOSYLATED A1C: CPT | Performed by: NURSE PRACTITIONER

## 2024-01-22 PROCEDURE — 80053 COMPREHEN METABOLIC PANEL: CPT | Performed by: NURSE PRACTITIONER

## 2024-01-22 PROCEDURE — 85025 COMPLETE CBC W/AUTO DIFF WBC: CPT | Performed by: NURSE PRACTITIONER

## 2024-01-29 NOTE — PROGRESS NOTES
Called and spoke to Paola Sow to relay results, PCP guidance and scheduled patient for 6M follow-up w/PCP. Pt verbalized understanding. No further questions/concerns at this time.

## 2024-02-28 DIAGNOSIS — E66.01 CLASS 3 SEVERE OBESITY DUE TO EXCESS CALORIES WITH SERIOUS COMORBIDITY AND BODY MASS INDEX (BMI) OF 50.0 TO 59.9 IN ADULT: ICD-10-CM

## 2024-02-28 DIAGNOSIS — E11.59 TYPE 2 DIABETES MELLITUS WITH OTHER CIRCULATORY COMPLICATION, WITHOUT LONG-TERM CURRENT USE OF INSULIN: ICD-10-CM

## 2024-03-06 DIAGNOSIS — E66.01 CLASS 3 SEVERE OBESITY DUE TO EXCESS CALORIES WITH SERIOUS COMORBIDITY AND BODY MASS INDEX (BMI) OF 50.0 TO 59.9 IN ADULT: ICD-10-CM

## 2024-03-06 DIAGNOSIS — E11.59 TYPE 2 DIABETES MELLITUS WITH OTHER CIRCULATORY COMPLICATION, WITHOUT LONG-TERM CURRENT USE OF INSULIN: ICD-10-CM

## 2024-03-06 RX ORDER — TIRZEPATIDE 2.5 MG/.5ML
INJECTION, SOLUTION SUBCUTANEOUS
Qty: 2 ML | Refills: 1 | Status: SHIPPED | OUTPATIENT
Start: 2024-03-06

## 2024-03-20 RX ORDER — ATORVASTATIN CALCIUM 10 MG/1
10 TABLET, FILM COATED ORAL DAILY
Qty: 90 TABLET | Refills: 1 | Status: SHIPPED | OUTPATIENT
Start: 2024-03-20

## 2024-04-02 RX ORDER — ERGOCALCIFEROL 1.25 MG/1
50000 CAPSULE ORAL WEEKLY
Qty: 13 CAPSULE | Refills: 1 | Status: SHIPPED | OUTPATIENT
Start: 2024-04-02

## 2024-04-19 DIAGNOSIS — E66.01 CLASS 3 SEVERE OBESITY DUE TO EXCESS CALORIES WITH SERIOUS COMORBIDITY AND BODY MASS INDEX (BMI) OF 50.0 TO 59.9 IN ADULT: ICD-10-CM

## 2024-04-19 DIAGNOSIS — E11.59 TYPE 2 DIABETES MELLITUS WITH OTHER CIRCULATORY COMPLICATION, WITHOUT LONG-TERM CURRENT USE OF INSULIN: ICD-10-CM

## 2024-04-29 ENCOUNTER — PRIOR AUTHORIZATION (OUTPATIENT)
Dept: FAMILY MEDICINE CLINIC | Facility: CLINIC | Age: 54
End: 2024-04-29
Payer: COMMERCIAL

## 2024-04-29 NOTE — TELEPHONE ENCOUNTER
CoverMyMeds    (Key: [id number])  PA Case ID #: [id number]  Rx #: [id number]    Drug - Mounjaro 2.5MG/0.5ML pen-injectors    Form - [facility] Electronic PA Form (2017 NCPDP)    Outcome  Approved today - April 29, 2024  Your PA request has been approved.  Authorization Expiration Date: 4/29/2025

## 2024-05-17 ENCOUNTER — OFFICE VISIT (OUTPATIENT)
Dept: FAMILY MEDICINE CLINIC | Facility: CLINIC | Age: 54
End: 2024-05-17
Payer: COMMERCIAL

## 2024-05-17 VITALS
BODY MASS INDEX: 45.99 KG/M2 | DIASTOLIC BLOOD PRESSURE: 88 MMHG | SYSTOLIC BLOOD PRESSURE: 134 MMHG | HEIGHT: 67 IN | TEMPERATURE: 97.6 F | OXYGEN SATURATION: 99 % | WEIGHT: 293 LBS | HEART RATE: 77 BPM

## 2024-05-17 DIAGNOSIS — E55.9 VITAMIN D DEFICIENCY: ICD-10-CM

## 2024-05-17 DIAGNOSIS — E78.2 MIXED HYPERLIPIDEMIA: ICD-10-CM

## 2024-05-17 DIAGNOSIS — E11.59 TYPE 2 DIABETES MELLITUS WITH OTHER CIRCULATORY COMPLICATION, WITHOUT LONG-TERM CURRENT USE OF INSULIN: ICD-10-CM

## 2024-05-17 DIAGNOSIS — I10 PRIMARY HYPERTENSION: ICD-10-CM

## 2024-05-17 DIAGNOSIS — Z09 FOLLOW-UP EXAM: Primary | ICD-10-CM

## 2024-05-17 PROCEDURE — 99214 OFFICE O/P EST MOD 30 MIN: CPT | Performed by: NURSE PRACTITIONER

## 2024-05-17 NOTE — PROGRESS NOTES
"Chief Complaint  Medication Follow-Up (Rx - Tirzepatide (Mounjaro) 2.5 MG/0.5ML solution pen-injector pen, Pt reports maintaining on this strength unable to tolerate Tirzepatide (MOUNJARO) 5 MG/0.5ML solution pen-injector pen)    Subjective        Medical History: has a past medical history of Acid reflux disease, Ankle pain, right, Anxiety, Arthritis, Asthma, Callus, Fallen arches, Heart disease, Heart disease, Hemorrhoids, High blood pressure, Hypertension, Ingrown toenail, Plantar warts, Rectal bleeding, Sleep apnea, and Tinnitus.     Surgical History: has a past surgical history that includes Dental surgery; Foot surgery (Bilateral); and Colonoscopy.     Family History: family history includes Arthritis in her mother; Cancer in her father; Hashimoto's thyroiditis in her sister; Hearing loss in her mother; Heart disease in her father and mother; Lupus in her mother; Osteoporosis in her mother; Prostate cancer in her father; Rashes / Skin problems in her father and mother; Rheum arthritis in her mother; Skin cancer in her father.     Social History: reports that she has never smoked. She has never used smokeless tobacco. She reports current alcohol use of about 1.0 standard drink of alcohol per week. She reports that she does not use drugs.    Paola Sow presents to [facility]  History of Present Illness  Patient started Mounjuro 2.5mg January 2024. She has been doing well on the dose, Patient states when she tried to move up to the 5 mg Mounjuro but has a lot of side effects with nausea, diarrhea and GERD. She moved dose back wesly tot 2.5 mg and is doing well. She has lost a total of 40 pounds since October 2023, feeling better.   Objective   Vital Signs:   /88 (BP Location: Right arm, Patient Position: Sitting, Cuff Size: Adult)   Pulse 77   Temp 97.6 °F (36.4 °C) (Infrared)   Ht 170.2 cm (67\")   Wt 133 kg (294 lb)   SpO2 99%   BMI 46.05 kg/m²       Wt Readings from " Last 3 Encounters:   05/17/24 133 kg (294 lb)   01/19/24 (!) 146 kg (322 lb)   09/29/23 (!) 152 kg (336 lb)        BP Readings from Last 3 Encounters:   05/17/24 134/88   01/19/24 130/84   09/29/23 142/92      Physical Exam  Vitals reviewed.   Constitutional:       General: She is not in acute distress.     Appearance: Normal appearance. She is well-developed. She is morbidly obese. She is not ill-appearing.   HENT:      Head: Normocephalic and atraumatic.   Eyes:      Conjunctiva/sclera: Conjunctivae normal.      Pupils: Pupils are equal, round, and reactive to light.   Cardiovascular:      Rate and Rhythm: Normal rate and regular rhythm.      Heart sounds: No murmur heard.  Pulmonary:      Effort: Pulmonary effort is normal.      Breath sounds: Normal breath sounds. No wheezing.   Skin:     General: Skin is warm and dry.   Neurological:      Mental Status: She is alert and oriented to person, place, and time.   Psychiatric:         Mood and Affect: Mood and affect normal.         Behavior: Behavior normal.         Thought Content: Thought content normal.         Judgment: Judgment normal.        Result Review :  {The following data was reviewed by DEWEY Stuart on 05/17/2024.  Common labs          9/29/2023    15:45 1/22/2024    08:28   Common Labs   Glucose 110  105    BUN 12  12    Creatinine 0.93  0.76    Sodium 142  142    Potassium 4.2  4.2    Chloride 106  104    Calcium 9.3  9.7    Albumin 4.1  3.9    Total Bilirubin <0.2  0.3    Alkaline Phosphatase 82  90    AST (SGOT) 19  22    ALT (SGPT) 18  21    WBC 8.59  8.34    Hemoglobin 14.8  14.9    Hematocrit 44.2  43.8    Platelets 289  277    Total Cholesterol 218  166    Triglycerides 263  99    HDL Cholesterol 45  54    LDL Cholesterol  126  94    Hemoglobin A1C 6.40  6.00      Data reviewed : previous office note             Current Outpatient Medications on File Prior to Visit   Medication Sig Dispense Refill    apixaban (ELIQUIS) 5 MG tablet  tablet Take 1 tablet by mouth 2 (Two) Times a Day.      atorvastatin (LIPITOR) 10 MG tablet Take 1 tablet by mouth Daily. 90 tablet 1    famotidine (PEPCID) 20 MG tablet Take 1 tablet by mouth Daily. daily      fluocinolone acetonide (DermOtic) 0.01 % oil otic oil Use 5 drops bilateral ears twice daily for 3 weeks. 20 mL 2    Glucos-Chondroit-Hyaluron-MSM (GLUCOSAMINE CHONDROITIN JOINT PO) Glucosamine Chondroitin Joint      ondansetron ODT (ZOFRAN-ODT) 4 MG disintegrating tablet Place 1 tablet on the tongue Every 8 (Eight) Hours As Needed for Nausea or Vomiting. 30 tablet 2    Tirzepatide (Mounjaro) 2.5 MG/0.5ML solution pen-injector pen Inject 0.5 mL under the skin into the appropriate area as directed 1 (One) Time Per Week. 2 mL 1    traZODone (DESYREL) 50 MG tablet Take 0.5 tablets by mouth At Night As Needed.      vitamin D (ERGOCALCIFEROL) 1.25 MG (97776 UT) capsule capsule TAKE 1 CAPSULE BY MOUTH 1 TIME EVERY WEEK 13 capsule 1    metoprolol succinate XL (TOPROL-XL) 50 MG 24 hr tablet Take 1 tablet by mouth Every 12 (Twelve) Hours for 30 days. 60 tablet 0    Tirzepatide (MOUNJARO) 5 MG/0.5ML solution pen-injector pen Inject 0.5 mL under the skin into the appropriate area as directed 1 (One) Time Per Week. Continuation dose (Patient not taking: Reported on 5/17/2024) 2 mL 2     No current facility-administered medications on file prior to visit.        Assessment and Plan  Diagnoses and all orders for this visit:    1. Follow-up exam (Primary)    2. Type 2 diabetes mellitus with other circulatory complication, without long-term current use of insulin  -     Cancel: Ambulatory Referral for Diabetic Eye Exam-Ophthalmology  -     CBC Auto Differential; Future  -     Comprehensive Metabolic Panel; Future  -     Hemoglobin A1c; Future  -     Lipid Panel; Future  -     MicroAlbumin, Urine, Random - Urine, Clean Catch; Future  -     TSH; Future    3. Mixed hyperlipidemia  -     CBC Auto Differential; Future  -      Comprehensive Metabolic Panel; Future  -     Lipid Panel; Future    4. Vitamin D deficiency    5. Primary hypertension  -     CBC Auto Differential; Future  -     Comprehensive Metabolic Panel; Future    Patient is doing well on medication will continue on 2.5mg Mounjuro with follow up in July 2025.  Will go ahead and put future lab orders and so patient can get labs done prior to coming in for her appointment in July.  I did discuss with patient return precautions, call with any questions or concerns.  Patient verbalized understanding agreeable with current treatment plan.    Follow Up   Return in about 2 months (around 7/17/2024).  Patient was given instructions and counseling regarding her condition or for health maintenance advice. Please see specific information pulled into the AVS if appropriate.       Part of this note may be electronic transcription/translation of spoken language to printed text using the Dragon dictation system

## 2024-05-31 ENCOUNTER — OFFICE VISIT (OUTPATIENT)
Dept: OBSTETRICS AND GYNECOLOGY | Facility: CLINIC | Age: 54
End: 2024-05-31
Payer: COMMERCIAL

## 2024-05-31 VITALS
DIASTOLIC BLOOD PRESSURE: 86 MMHG | SYSTOLIC BLOOD PRESSURE: 145 MMHG | WEIGHT: 293 LBS | HEART RATE: 75 BPM | BODY MASS INDEX: 46.99 KG/M2

## 2024-05-31 DIAGNOSIS — Z01.419 ENCOUNTER FOR GYNECOLOGICAL EXAMINATION WITHOUT ABNORMAL FINDING: Primary | ICD-10-CM

## 2024-05-31 NOTE — PROGRESS NOTES
Well Woman Visit    CC: Scheduled annual well gyn visit  Chief Complaint   Patient presents with    Gynecologic Exam     wwe       Myriad intake in the past?: no  DID NOT QUALIFY TODAY    HPI:   53 y.o.   Social History     Substance and Sexual Activity   Sexual Activity Yes    Partners: Male    Birth control/protection: Post-menopausal, None       Menses:   No cycle in over a year  Pain with menses:  None      PCP: does manage PMHx and preventative labs  History: PMHx, Meds, Allergies, PSHx, Social Hx, and POBHx all reviewed and updated.    Pt has no complaints today. Is considering laser hair removal for her facial hair.    PHYSICAL EXAM:  /86   Pulse 75   Wt 136 kg (300 lb)   BMI 46.99 kg/m²  Not found.     Exam conducted with a chaperone present  General- NAD, alert and oriented, appropriate  Psych- Normal mood, good memory  Neck- No masses, no thyroid enlargement  CV- Regular rhythm, no murnurs  Resp- CTA to bases, no wheezes  Abdomen- Soft, non distended, non tender, no masses    Breast left-  Bilaterally symmetrical, no masses, non tender, no nipple discharge  Breast right- Bilaterally symmetrical, no masses, non tender, no nipple discharge    External genitalia- Normal female, no lesions  Urethra/meatus- Normal, no masses, non tender  Bladder- Normal, no masses, non tender  Vagina- Normal, no atrophy, no lesions, no discharge.  Prolapse : none noted, not examined with split speculum to delineate  Cvx- Normal, no lesions, no discharge, No cervical motion tenderness  Uterus- Normal size, shape & consistency.  Non tender, mobile.  Adnexa- No mass, non tender  Anus/Rectum/Perineum- Not performed    Lymphatic- No palpable neck, axillary, or groin nodes  Ext- No edema, no cyanosis    Skin- No lesions, no rashes, no acanthosis nigricans      ASSESSMENT and PLAN:    Diagnoses and all orders for this visit:    1. Encounter for gynecological examination without abnormal finding (Primary)  -     Mammo  Screening Digital Tomosynthesis Bilateral With CAD; Future        Preventative:  BREAST HEALTH- Monthly self breast exam importance and how to reviewed. MMG and/or MRI (prn) reviewed per society guidelines and her individual history. Screen: Updated today  CERVICAL CANCER Screening- Reviewed current ASCCP guidelines for screening w and wo cotest HPV, age specific.  Screen: Already up to date  COLON CANCER Screening- Reviewed current medical society guidelines and options.  Screen:  Already up to date  SEXUAL HEALTH: Declines STD screening  VACCINATIONS Recommended: Covid vaccine, Flu annually, Zoster (51yo and older).  Importance discussed, risk being unvaccinated reviewed.  Questions answered  Smoking status- NON SMOKER/VAPER        She understands the importance of having any ordered tests to be performed in a timely fashion.  The risks of not performing them include, but are not limited to, advanced cancer stages, bone loss from osteoporosis and/or subsequent increase in morbidity and/or mortality.  She is encouraged to review her results online and/or contact or office if she has questions.     Follow Up:  Return in about 1 year (around 5/31/2025) for Annual physical.        Bry Orantes, APRN  05/31/2024    [facility] [address]  [facility]  [address] [place] [address]  Dept: [phone]  Dept Fax: [phone]  Loc: [phone]

## 2024-06-07 ENCOUNTER — OFFICE VISIT (OUTPATIENT)
Dept: PULMONOLOGY | Facility: CLINIC | Age: 54
End: 2024-06-07
Payer: COMMERCIAL

## 2024-06-07 VITALS
TEMPERATURE: 97.6 F | WEIGHT: 293 LBS | RESPIRATION RATE: 16 BRPM | BODY MASS INDEX: 45.99 KG/M2 | DIASTOLIC BLOOD PRESSURE: 74 MMHG | HEIGHT: 67 IN | SYSTOLIC BLOOD PRESSURE: 132 MMHG | OXYGEN SATURATION: 97 % | HEART RATE: 69 BPM

## 2024-06-07 DIAGNOSIS — G47.33 OSA ON CPAP: Primary | Chronic | ICD-10-CM

## 2024-06-07 NOTE — PROGRESS NOTES
Primary Care Provider  Laurence Jacob APRN     Referring Provider  No ref. provider found     Chief Complaint  Sleep Apnea and Follow-up (1 year f/up )    Subjective          Paola Sow presents to [facility]  History of Present Illness  Paola Sow is a 53 y.o. female patient here for management of sleep apnea.    Patient states she is doing well since her last visit.  She denies using any antibiotics or steroids for her lungs.  She denies any fevers, chills, or cough.  She continues wear her CPAP machine at night and is benefiting from its use.  Patient's most recent compliance report is from 5/8/2024 until 6/6/2024.  This shows a usage of 30/30 days with an average usage of 6 hours 37 minutes.  Patient's AHI is 0.7.  She is on auto CPAP 12-16 cmH2O.  Patient's average pressure is 12.5.  States that she is doing well and has no additional concerns at this time.  She is able to perform her ADLs without difficulty.  She is up-to-date with her COVID, flu and pneumonia vaccines.     Her history of smoking is   Tobacco Use: Low Risk  (6/7/2024)    Patient History     Smoking Tobacco Use: Never     Smokeless Tobacco Use: Never     Passive Exposure: Never   .    Review of Systems   Constitutional:  Negative for chills, fatigue, fever, unexpected weight gain and unexpected weight loss.   HENT:  Congestion: Nasal.    Respiratory:  Negative for apnea, cough, shortness of breath and wheezing.         Negative for Hemoptysis     Cardiovascular:  Negative for chest pain, palpitations and leg swelling.   Skin:         Negative for cyanosis      Sleep: Negative for Excessive daytime sleepiness  Negative for morning headaches  Negative for Snoring    Family History   Problem Relation Age of Onset    Prostate cancer Father     Rashes / Skin problems Father     Heart disease Father     Cancer Father     Skin cancer Father     Rashes / Skin problems Mother     Rheum  arthritis Mother     Heart disease Mother     Osteoporosis Mother     Arthritis Mother     Lupus Mother     Hearing loss Mother     Hashimoto's thyroiditis Sister     Diabetes Neg Hx     Stroke Neg Hx     Breast cancer Neg Hx     Ovarian cancer Neg Hx     Uterine cancer Neg Hx     Colon cancer Neg Hx     Melanoma Neg Hx         Social History     Socioeconomic History    Marital status:    Tobacco Use    Smoking status: Never     Passive exposure: Never    Smokeless tobacco: Never   Vaping Use    Vaping status: Never Used   Substance and Sexual Activity    Alcohol use: Yes     Alcohol/week: 1.0 standard drink of alcohol     Types: 1 Glasses of wine per week     Comment: I only drink sporadically.    Drug use: Never    Sexual activity: Yes     Partners: Male     Birth control/protection: Post-menopausal, None        Past Medical History:   Diagnosis Date    Abnormal Pap smear of cervix     Acid reflux disease     Ankle pain, right     Anxiety     Arthritis     Asthma     Callus     Depression     Diabetes mellitus     Fallen arches     Heart disease     Heart disease     Hemorrhoids     High blood pressure     Hypertension     Ingrown toenail     Plantar warts     Polycystic ovary syndrome     Rectal bleeding     Sleep apnea     Tinnitus         Immunization History   Administered Date(s) Administered    COVID-19 (JAMAAL) 03/11/2021    COVID-19 (MODERNA) 1st,2nd,3rd Dose Monovalent 10/23/2021    COVID-19 (MODERNA) BIVALENT 12+YRS 11/11/2022    Flu Vaccine Intradermal Quad 18-64YR 10/01/2021, 10/15/2023    Influenza, Unspecified 10/01/2019, 10/09/2020    Pneumococcal Conjugate 20-Valent (PCV20) 05/24/2022    Shingrix 09/14/2022, 01/22/2023         No Known Allergies       Current Outpatient Medications:     apixaban (ELIQUIS) 5 MG tablet tablet, Take 1 tablet by mouth 2 (Two) Times a Day., Disp: , Rfl:     atorvastatin (LIPITOR) 10 MG tablet, Take 1 tablet by mouth Daily., Disp: 90 tablet, Rfl: 1    famotidine  (PEPCID) 20 MG tablet, Take 1 tablet by mouth Daily. daily, Disp: , Rfl:     fluocinolone acetonide (DermOtic) 0.01 % oil otic oil, Use 5 drops bilateral ears twice daily for 3 weeks., Disp: 20 mL, Rfl: 2    Glucos-Chondroit-Hyaluron-MSM (GLUCOSAMINE CHONDROITIN JOINT PO), Glucosamine Chondroitin Joint, Disp: , Rfl:     ondansetron ODT (ZOFRAN-ODT) 4 MG disintegrating tablet, Place 1 tablet on the tongue Every 8 (Eight) Hours As Needed for Nausea or Vomiting., Disp: 30 tablet, Rfl: 2    Tirzepatide (Mounjaro) 2.5 MG/0.5ML solution pen-injector pen, Inject 0.5 mL under the skin into the appropriate area as directed 1 (One) Time Per Week., Disp: 2 mL, Rfl: 1    traZODone (DESYREL) 50 MG tablet, Take 0.5 tablets by mouth At Night As Needed., Disp: , Rfl:     vitamin D (ERGOCALCIFEROL) 1.25 MG (13380 UT) capsule capsule, TAKE 1 CAPSULE BY MOUTH 1 TIME EVERY WEEK, Disp: 13 capsule, Rfl: 1    metoprolol succinate XL (TOPROL-XL) 50 MG 24 hr tablet, Take 1 tablet by mouth Every 12 (Twelve) Hours for 30 days., Disp: 60 tablet, Rfl: 0    Tirzepatide (MOUNJARO) 5 MG/0.5ML solution pen-injector pen, Inject 0.5 mL under the skin into the appropriate area as directed 1 (One) Time Per Week. Continuation dose (Patient not taking: Reported on 6/7/2024), Disp: 2 mL, Rfl: 2     Objective   Physical Exam  Constitutional:       General: She is not in acute distress.     Appearance: Normal appearance. She is normal weight and overweight.   HENT:      Right Ear: Hearing normal.      Left Ear: Hearing normal.      Nose: No nasal tenderness or congestion.      Mouth/Throat:      Mouth: Mucous membranes are moist. No oral lesions.   Eyes:      Extraocular Movements: Extraocular movements intact.      Pupils: Pupils are equal, round, and reactive to light.   Cardiovascular:      Rate and Rhythm: Normal rate and regular rhythm.      Pulses: Normal pulses.      Heart sounds: Normal heart sounds. No murmur heard.  Pulmonary:      Effort:  "Pulmonary effort is normal.      Breath sounds: Normal breath sounds. No wheezing, rhonchi or rales.   Musculoskeletal:      Right lower leg: No edema.      Left lower leg: No edema.   Skin:     General: Skin is warm and dry.      Findings: No lesion or rash.   Neurological:      General: No focal deficit present.      Mental Status: She is alert and oriented to person, place, and time.   Psychiatric:         Mood and Affect: Affect normal. Mood is not anxious or depressed.         Vital Signs:   /74 (BP Location: Right arm, Patient Position: Sitting, Cuff Size: Large Adult)   Pulse 69   Temp 97.6 °F (36.4 °C) (Oral)   Resp 16   Ht 170.2 cm (67\")   Wt 134 kg (296 lb 6.4 oz)   SpO2 97% Comment: RA  BMI 46.42 kg/m²        Result Review :   The following data was reviewed by: DEWEY Dale on 06/07/2024:  CMP          9/29/2023    15:45 1/22/2024    08:28   CMP   Glucose 110  105    BUN 12  12    Creatinine 0.93  0.76    EGFR 74.1  93.8    Sodium 142  142    Potassium 4.2  4.2    Chloride 106  104    Calcium 9.3  9.7    Total Protein 7.0  6.7    Albumin 4.1  3.9    Globulin 2.9  2.8    Total Bilirubin <0.2  0.3    Alkaline Phosphatase 82  90    AST (SGOT) 19  22    ALT (SGPT) 18  21    Albumin/Globulin Ratio 1.4  1.4    BUN/Creatinine Ratio 12.9  15.8    Anion Gap 7.4  11.3      CBC w/diff          9/29/2023    15:45 1/22/2024    08:28   CBC w/Diff   WBC 8.59  8.34    RBC 4.73  4.74    Hemoglobin 14.8  14.9    Hematocrit 44.2  43.8    MCV 93.4  92.4    MCH 31.3  31.4    MCHC 33.5  34.0    RDW 13.2  12.5    Platelets 289  277    Neutrophil Rel % 62.6  65.2    Immature Granulocyte Rel % 0.5  0.5    Lymphocyte Rel % 24.7  21.3    Monocyte Rel % 9.5  8.6    Eosinophil Rel % 2.1  3.7    Basophil Rel % 0.6  0.7      Data reviewed : Radiologic studies chest xray 4/28/2023 and my last office note    Procedures        Assessment and Plan    Diagnoses and all orders for this visit:    1. MARGOT on CPAP " (Primary)  Comments:  continue CPAP at current settings.  Recommend to clean mask and tubing daily          Follow Up   Return in about 1 year (around 6/7/2025) for Recheck with Wilfredo.  Patient was given instructions and counseling regarding her condition or for health maintenance advice. Please see specific information pulled into the AVS if appropriate.

## 2024-06-21 DIAGNOSIS — E66.01 CLASS 3 SEVERE OBESITY DUE TO EXCESS CALORIES WITH SERIOUS COMORBIDITY AND BODY MASS INDEX (BMI) OF 50.0 TO 59.9 IN ADULT: ICD-10-CM

## 2024-06-21 DIAGNOSIS — E11.59 TYPE 2 DIABETES MELLITUS WITH OTHER CIRCULATORY COMPLICATION, WITHOUT LONG-TERM CURRENT USE OF INSULIN: ICD-10-CM

## 2024-06-21 RX ORDER — TIRZEPATIDE 2.5 MG/.5ML
INJECTION, SOLUTION SUBCUTANEOUS
Qty: 2 ML | Refills: 1 | Status: SHIPPED | OUTPATIENT
Start: 2024-06-21

## 2024-07-22 ENCOUNTER — CLINICAL SUPPORT (OUTPATIENT)
Dept: FAMILY MEDICINE CLINIC | Facility: CLINIC | Age: 54
End: 2024-07-22
Payer: COMMERCIAL

## 2024-07-22 DIAGNOSIS — E78.2 MIXED HYPERLIPIDEMIA: ICD-10-CM

## 2024-07-22 DIAGNOSIS — E11.59 TYPE 2 DIABETES MELLITUS WITH OTHER CIRCULATORY COMPLICATION, WITHOUT LONG-TERM CURRENT USE OF INSULIN: ICD-10-CM

## 2024-07-22 DIAGNOSIS — I10 PRIMARY HYPERTENSION: ICD-10-CM

## 2024-07-22 LAB
ALBUMIN SERPL-MCNC: 4.2 G/DL (ref 3.5–5.2)
ALBUMIN UR-MCNC: <1.2 MG/DL
ALBUMIN/GLOB SERPL: 1.4 G/DL
ALP SERPL-CCNC: 88 U/L (ref 39–117)
ALT SERPL W P-5'-P-CCNC: 23 U/L (ref 1–33)
ANION GAP SERPL CALCULATED.3IONS-SCNC: 8 MMOL/L (ref 5–15)
AST SERPL-CCNC: 19 U/L (ref 1–32)
BASOPHILS # BLD AUTO: 0.07 10*3/MM3 (ref 0–0.2)
BASOPHILS NFR BLD AUTO: 0.8 % (ref 0–1.5)
BILIRUB SERPL-MCNC: 0.3 MG/DL (ref 0–1.2)
BUN SERPL-MCNC: 17 MG/DL (ref 6–20)
BUN/CREAT SERPL: 21 (ref 7–25)
CALCIUM SPEC-SCNC: 9.8 MG/DL (ref 8.6–10.5)
CHLORIDE SERPL-SCNC: 104 MMOL/L (ref 98–107)
CHOLEST SERPL-MCNC: 144 MG/DL (ref 0–200)
CO2 SERPL-SCNC: 27 MMOL/L (ref 22–29)
CREAT SERPL-MCNC: 0.81 MG/DL (ref 0.57–1)
DEPRECATED RDW RBC AUTO: 43.7 FL (ref 37–54)
EGFRCR SERPLBLD CKD-EPI 2021: 86.9 ML/MIN/1.73
EOSINOPHIL # BLD AUTO: 0.21 10*3/MM3 (ref 0–0.4)
EOSINOPHIL NFR BLD AUTO: 2.3 % (ref 0.3–6.2)
ERYTHROCYTE [DISTWIDTH] IN BLOOD BY AUTOMATED COUNT: 12.8 % (ref 12.3–15.4)
GLOBULIN UR ELPH-MCNC: 2.9 GM/DL
GLUCOSE SERPL-MCNC: 70 MG/DL (ref 65–99)
HBA1C MFR BLD: 5.6 % (ref 4.8–5.6)
HCT VFR BLD AUTO: 47.8 % (ref 34–46.6)
HDLC SERPL-MCNC: 48 MG/DL (ref 40–60)
HGB BLD-MCNC: 15.6 G/DL (ref 12–15.9)
IMM GRANULOCYTES # BLD AUTO: 0.02 10*3/MM3 (ref 0–0.05)
IMM GRANULOCYTES NFR BLD AUTO: 0.2 % (ref 0–0.5)
LDLC SERPL CALC-MCNC: 72 MG/DL (ref 0–100)
LDLC/HDLC SERPL: 1.42 {RATIO}
LYMPHOCYTES # BLD AUTO: 2.25 10*3/MM3 (ref 0.7–3.1)
LYMPHOCYTES NFR BLD AUTO: 24.5 % (ref 19.6–45.3)
MCH RBC QN AUTO: 30.5 PG (ref 26.6–33)
MCHC RBC AUTO-ENTMCNC: 32.6 G/DL (ref 31.5–35.7)
MCV RBC AUTO: 93.4 FL (ref 79–97)
MONOCYTES # BLD AUTO: 0.74 10*3/MM3 (ref 0.1–0.9)
MONOCYTES NFR BLD AUTO: 8.1 % (ref 5–12)
NEUTROPHILS NFR BLD AUTO: 5.88 10*3/MM3 (ref 1.7–7)
NEUTROPHILS NFR BLD AUTO: 64.1 % (ref 42.7–76)
NRBC BLD AUTO-RTO: 0 /100 WBC (ref 0–0.2)
PLATELET # BLD AUTO: 295 10*3/MM3 (ref 140–450)
PMV BLD AUTO: 9 FL (ref 6–12)
POTASSIUM SERPL-SCNC: 4.5 MMOL/L (ref 3.5–5.2)
PROT SERPL-MCNC: 7.1 G/DL (ref 6–8.5)
RBC # BLD AUTO: 5.12 10*6/MM3 (ref 3.77–5.28)
SODIUM SERPL-SCNC: 139 MMOL/L (ref 136–145)
TRIGL SERPL-MCNC: 140 MG/DL (ref 0–150)
TSH SERPL DL<=0.05 MIU/L-ACNC: 2.07 UIU/ML (ref 0.27–4.2)
VLDLC SERPL-MCNC: 24 MG/DL (ref 5–40)
WBC NRBC COR # BLD AUTO: 9.17 10*3/MM3 (ref 3.4–10.8)

## 2024-07-22 PROCEDURE — 36415 COLL VENOUS BLD VENIPUNCTURE: CPT | Performed by: NURSE PRACTITIONER

## 2024-07-22 PROCEDURE — 83036 HEMOGLOBIN GLYCOSYLATED A1C: CPT | Performed by: NURSE PRACTITIONER

## 2024-07-22 PROCEDURE — 80061 LIPID PANEL: CPT | Performed by: NURSE PRACTITIONER

## 2024-07-22 PROCEDURE — 80050 GENERAL HEALTH PANEL: CPT | Performed by: NURSE PRACTITIONER

## 2024-07-22 PROCEDURE — 82043 UR ALBUMIN QUANTITATIVE: CPT | Performed by: NURSE PRACTITIONER

## 2024-07-29 ENCOUNTER — OFFICE VISIT (OUTPATIENT)
Dept: FAMILY MEDICINE CLINIC | Facility: CLINIC | Age: 54
End: 2024-07-29
Payer: COMMERCIAL

## 2024-07-29 VITALS
HEART RATE: 72 BPM | WEIGHT: 291 LBS | BODY MASS INDEX: 45.67 KG/M2 | TEMPERATURE: 97.1 F | DIASTOLIC BLOOD PRESSURE: 98 MMHG | HEIGHT: 67 IN | SYSTOLIC BLOOD PRESSURE: 142 MMHG | OXYGEN SATURATION: 97 %

## 2024-07-29 DIAGNOSIS — Z09 FOLLOW-UP EXAM: Primary | ICD-10-CM

## 2024-07-29 DIAGNOSIS — E78.2 MIXED HYPERLIPIDEMIA: ICD-10-CM

## 2024-07-29 DIAGNOSIS — E55.9 VITAMIN D DEFICIENCY: ICD-10-CM

## 2024-07-29 DIAGNOSIS — I10 PRIMARY HYPERTENSION: ICD-10-CM

## 2024-07-29 DIAGNOSIS — G47.00 INSOMNIA, UNSPECIFIED TYPE: ICD-10-CM

## 2024-07-29 DIAGNOSIS — E66.01 CLASS 3 SEVERE OBESITY DUE TO EXCESS CALORIES WITH SERIOUS COMORBIDITY AND BODY MASS INDEX (BMI) OF 50.0 TO 59.9 IN ADULT: ICD-10-CM

## 2024-07-29 DIAGNOSIS — Z78.0 POSTMENOPAUSAL: ICD-10-CM

## 2024-07-29 DIAGNOSIS — E11.59 TYPE 2 DIABETES MELLITUS WITH OTHER CIRCULATORY COMPLICATION, WITHOUT LONG-TERM CURRENT USE OF INSULIN: ICD-10-CM

## 2024-07-29 PROCEDURE — 99214 OFFICE O/P EST MOD 30 MIN: CPT | Performed by: NURSE PRACTITIONER

## 2024-07-29 RX ORDER — TRAZODONE HYDROCHLORIDE 50 MG/1
25 TABLET ORAL NIGHTLY PRN
Qty: 90 TABLET | Refills: 1 | Status: SHIPPED | OUTPATIENT
Start: 2024-07-29

## 2024-07-29 NOTE — PROGRESS NOTES
Chief Complaint  Follow-up, Diabetes (Type 2 diabetes mellitus with other circulatory complication, without long-term current use of insulin), and Med Refill (Trazadone for sleep)    Subjective        Medical History: has a past medical history of Abnormal Pap smear of cervix, Acid reflux disease, Ankle pain, right, Anxiety, Arthritis, Asthma, Callus, Depression, Diabetes mellitus, Fallen arches, Heart disease, Heart disease, Hemorrhoids, High blood pressure, Hypertension, Ingrown toenail, Plantar warts, Polycystic ovary syndrome, Rectal bleeding, Sleep apnea, and Tinnitus.     Surgical History: has a past surgical history that includes Dental surgery; Foot surgery (Bilateral); and Colonoscopy.     Family History: family history includes Arthritis in her mother; Cancer in her father; Hashimoto's thyroiditis in her sister; Hearing loss in her mother; Heart disease in her father and mother; Lupus in her mother; Osteoporosis in her mother; Prostate cancer in her father; Rashes / Skin problems in her father and mother; Rheum arthritis in her mother; Skin cancer in her father.     Social History: reports that she has never smoked. She has never been exposed to tobacco smoke. She has never used smokeless tobacco. She reports current alcohol use of about 1.0 standard drink of alcohol per week. She reports that she does not use drugs.    Paola Sow presents to [facility]  History of Present Illness  Diabetes  She presents for her initial diabetic visit. She has type 2 diabetes mellitus. Her disease course has been stable. There are no hypoglycemic associated symptoms. There are no diabetic associated symptoms. There are no hypoglycemic complications. There are no diabetic complications. Risk factors for coronary artery disease include diabetes mellitus, dyslipidemia, hypertension, obesity and post-menopausal. When asked about current treatments, none were reported. Her weight is stable.  "When asked about meal planning, she reported none. An ACE inhibitor/angiotensin II receptor blocker is not being taken. Patient is wanting to increase the Mounjaro to 5 mg.  She states that she would like to do every other month of 5 mg and 2.5 mg.  I will send over refill 5 mg she has an unopened box of 2.5 mg  at home.  Discussed with patient to call when she is needing a refill of the 2.5 mg.  Patient verbalized understanding.  Patient's postmenopausal family history of DEXA  Hyperlipidemia  This is a chronic problem. The current episode started more than 1 year ago. Recent lipid tests were reviewed and are variable. Exacerbating diseases include obesity. Pertinent negatives include no shortness of breath. Current antihyperlipidemic treatment includes diet change.  Hypertension  This is a chronic problem. The current episode started more than 1 year ago. The problem is controlled. Pertinent negatives include no anxiety, headaches, peripheral edema or shortness of breath. Current antihypertension treatment includes beta blockers and diuretics. The current treatment provides significant improvement. There are no compliance problems.    Objective   Vital Signs:   /98 (BP Location: Left arm, Patient Position: Sitting, Cuff Size: Adult)   Pulse 72   Temp 97.1 °F (36.2 °C) (Infrared)   Ht 170.2 cm (67\")   Wt 132 kg (291 lb)   SpO2 97%   BMI 45.58 kg/m²       Wt Readings from Last 3 Encounters:   07/29/24 132 kg (291 lb)   06/07/24 134 kg (296 lb 6.4 oz)   05/31/24 136 kg (300 lb)        BP Readings from Last 3 Encounters:   07/29/24 142/98   06/07/24 132/74   05/31/24 145/86        Class 3 Severe Obesity (BMI >=40). Obesity-related health conditions include the following: hypertension, diabetes mellitus, dyslipidemias, and GERD. Obesity is improving with treatment. BMI is is above average; no BMI management plan is appropriate. We discussed low calorie, low carb based diet program, portion control, and " increasing exercise.       Physical Exam  Vitals reviewed.   Constitutional:       General: She is not in acute distress.     Appearance: Normal appearance. She is well-developed. She is obese. She is not ill-appearing.   HENT:      Head: Normocephalic and atraumatic.   Eyes:      Conjunctiva/sclera: Conjunctivae normal.      Pupils: Pupils are equal, round, and reactive to light.   Cardiovascular:      Rate and Rhythm: Normal rate and regular rhythm.      Heart sounds: No murmur heard.  Pulmonary:      Effort: Pulmonary effort is normal.      Breath sounds: Normal breath sounds. No wheezing.   Skin:     General: Skin is warm and dry.   Neurological:      Mental Status: She is alert and oriented to person, place, and time.   Psychiatric:         Mood and Affect: Mood and affect normal.         Behavior: Behavior normal.         Thought Content: Thought content normal.         Judgment: Judgment normal.        Result Review :  {The following data was reviewed by DEWEY Stuart on 07/29/2024.  Common labs          9/29/2023    15:45 1/22/2024    08:28 7/22/2024    15:16   Common Labs   Glucose 110  105  70    BUN 12  12  17    Creatinine 0.93  0.76  0.81    Sodium 142  142  139    Potassium 4.2  4.2  4.5    Chloride 106  104  104    Calcium 9.3  9.7  9.8    Albumin 4.1  3.9  4.2    Total Bilirubin <0.2  0.3  0.3    Alkaline Phosphatase 82  90  88    AST (SGOT) 19  22  19    ALT (SGPT) 18  21  23    WBC 8.59  8.34  9.17    Hemoglobin 14.8  14.9  15.6    Hematocrit 44.2  43.8  47.8    Platelets 289  277  295    Total Cholesterol 218  166  144    Triglycerides 263  99  140    HDL Cholesterol 45  54  48    LDL Cholesterol  126  94  72    Hemoglobin A1C 6.40  6.00  5.60    Microalbumin, Urine   <1.2      Data reviewed : Previous office note             Current Outpatient Medications on File Prior to Visit   Medication Sig Dispense Refill    apixaban (ELIQUIS) 5 MG tablet tablet Take 1 tablet by mouth 2 (Two)  Times a Day.      atorvastatin (LIPITOR) 10 MG tablet Take 1 tablet by mouth Daily. 90 tablet 1    famotidine (PEPCID) 20 MG tablet Take 1 tablet by mouth Daily. daily      fluocinolone acetonide (DermOtic) 0.01 % oil otic oil Use 5 drops bilateral ears twice daily for 3 weeks. 20 mL 2    Glucos-Chondroit-Hyaluron-MSM (GLUCOSAMINE CHONDROITIN JOINT PO) Glucosamine Chondroitin Joint      Mounjaro 2.5 MG/0.5ML solution pen-injector pen ADMINISTER 2.5 MG UNDER THE SKIN 1 TIME EVERY WEEK AS DIRECTED 2 mL 1    ondansetron ODT (ZOFRAN-ODT) 4 MG disintegrating tablet Place 1 tablet on the tongue Every 8 (Eight) Hours As Needed for Nausea or Vomiting. 30 tablet 2    vitamin D (ERGOCALCIFEROL) 1.25 MG (68437 UT) capsule capsule TAKE 1 CAPSULE BY MOUTH 1 TIME EVERY WEEK 13 capsule 1    [DISCONTINUED] traZODone (DESYREL) 50 MG tablet Take 0.5 tablets by mouth At Night As Needed.      metoprolol succinate XL (TOPROL-XL) 50 MG 24 hr tablet Take 1 tablet by mouth Every 12 (Twelve) Hours for 30 days. 60 tablet 0    [DISCONTINUED] Tirzepatide (MOUNJARO) 5 MG/0.5ML solution pen-injector pen Inject 0.5 mL under the skin into the appropriate area as directed 1 (One) Time Per Week. Continuation dose (Patient not taking: Reported on 6/7/2024) 2 mL 2     No current facility-administered medications on file prior to visit.        Assessment and Plan  Diagnoses and all orders for this visit:    1. Follow-up exam (Primary)    2. Type 2 diabetes mellitus with other circulatory complication, without long-term current use of insulin  -     Tirzepatide (MOUNJARO) 5 MG/0.5ML solution pen-injector pen; Inject 0.5 mL under the skin into the appropriate area as directed 1 (One) Time Per Week. Continuation dose  Dispense: 2 mL; Refill: 2    3. Mixed hyperlipidemia    4. Primary hypertension    5. Class 3 severe obesity due to excess calories with serious comorbidity and body mass index (BMI) of 50.0 to 59.9 in adult  -     Tirzepatide (MOUNJARO) 5  MG/0.5ML solution pen-injector pen; Inject 0.5 mL under the skin into the appropriate area as directed 1 (One) Time Per Week. Continuation dose  Dispense: 2 mL; Refill: 2    6. Vitamin D deficiency    7. Insomnia, unspecified type  -     traZODone (DESYREL) 50 MG tablet; Take 0.5 tablets by mouth At Night As Needed for Sleep.  Dispense: 90 tablet; Refill: 1    8. Postmenopausal  -     DEXA Bone Density Axial    Labs completed prior to patient being seen in office today.  Overall labs look good and are stable.  Patient is postmenopausal family history of osteoporosis, will order DEXA scan today.  Blood pressure elevated at 142/98.  She sees Dr. Frank Boykin cardiology at 1 time she was on lisinopril but has been taken off.  I did discuss with patient to keep an eye on her blood pressure if she is noticing her blood pressure over 140s over 80s to please call office and we will start on blood pressure medication.  Patient verbalized understanding she has no systemic symptoms at this time denies any headache, chest pain or change in vision.    Follow Up   Return in about 6 months (around 1/29/2025) for Recheck.  Patient was given instructions and counseling regarding her condition or for health maintenance advice. Please see specific information pulled into the AVS if appropriate.       Part of this note may be electronic transcription/translation of spoken language to printed text using the Dragon dictation system

## 2024-08-12 DIAGNOSIS — Z78.0 POST-MENOPAUSAL: Primary | ICD-10-CM

## 2024-09-09 ENCOUNTER — HOSPITAL ENCOUNTER (OUTPATIENT)
Dept: BONE DENSITY | Facility: HOSPITAL | Age: 54
Discharge: HOME | End: 2024-09-09
Payer: COMMERCIAL

## 2024-09-09 ENCOUNTER — HOSPITAL ENCOUNTER (OUTPATIENT)
Dept: MAMMOGRAPHY | Facility: HOSPITAL | Age: 54
Discharge: HOME | End: 2024-09-09
Payer: COMMERCIAL

## 2024-09-09 DIAGNOSIS — Z01.419 ENCOUNTER FOR GYNECOLOGICAL EXAMINATION WITHOUT ABNORMAL FINDING: ICD-10-CM

## 2024-09-09 DIAGNOSIS — Z78.0 POST-MENOPAUSAL: ICD-10-CM

## 2024-09-09 PROCEDURE — 77080 DXA BONE DENSITY AXIAL: CPT

## 2024-09-09 PROCEDURE — 77063 BREAST TOMOSYNTHESIS BI: CPT

## 2024-09-09 PROCEDURE — 77067 SCR MAMMO BI INCL CAD: CPT

## 2024-09-12 RX ORDER — ATORVASTATIN CALCIUM 10 MG/1
10 TABLET, FILM COATED ORAL DAILY
Qty: 90 TABLET | Refills: 1 | Status: SHIPPED | OUTPATIENT
Start: 2024-09-12

## 2024-09-12 NOTE — TELEPHONE ENCOUNTER
Rx Refill Note  Requested Prescriptions     Pending Prescriptions Disp Refills    atorvastatin (LIPITOR) 10 MG tablet 90 tablet 1     Sig: Take 1 tablet by mouth Daily.      Last office visit with prescribing clinician: 7/29/2024   Last telemedicine visit with prescribing clinician: Visit date not found   Next office visit with prescribing clinician: Visit date not found                         Would you like a call back once the refill request has been completed: [] Yes [] No    If the office needs to give you a call back, can they leave a voicemail: [] Yes [] No    Maya Beltrán Rep  09/12/24, 13:42 EDT

## 2024-11-07 ENCOUNTER — PRIOR AUTHORIZATION (OUTPATIENT)
Dept: FAMILY MEDICINE CLINIC | Facility: CLINIC | Age: 54
End: 2024-11-07
Payer: COMMERCIAL

## 2024-11-07 NOTE — TELEPHONE ENCOUNTER
Mounjaro Sent to Plan today          Your PA has been resolved, no additional PA is required. For further inquiries please contact the number on the back of the member prescription card.

## 2024-12-03 ENCOUNTER — TELEMEDICINE (OUTPATIENT)
Dept: FAMILY MEDICINE CLINIC | Facility: TELEHEALTH | Age: 54
End: 2024-12-03
Payer: COMMERCIAL

## 2024-12-03 VITALS — HEART RATE: 37 BPM | TEMPERATURE: 97.4 F

## 2024-12-03 DIAGNOSIS — H10.32 ACUTE CONJUNCTIVITIS OF LEFT EYE, UNSPECIFIED ACUTE CONJUNCTIVITIS TYPE: Primary | ICD-10-CM

## 2024-12-03 PROCEDURE — 99213 OFFICE O/P EST LOW 20 MIN: CPT | Performed by: NURSE PRACTITIONER

## 2024-12-03 RX ORDER — POLYMYXIN B SULFATE AND TRIMETHOPRIM 1; 10000 MG/ML; [USP'U]/ML
1 SOLUTION OPHTHALMIC EVERY 4 HOURS
Qty: 10 ML | Refills: 0 | Status: SHIPPED | OUTPATIENT
Start: 2024-12-03 | End: 2024-12-10

## 2024-12-03 NOTE — PATIENT INSTRUCTIONS
Bacterial Conjunctivitis, Adult  Bacterial conjunctivitis is an infection of your conjunctiva. This is the clear membrane that covers the white part of your eye and the inner part of your eyelid. This infection can make your eye:  Red or pink.  Itchy or irritated.  This condition spreads easily from person to person (is contagious) and from one eye to the other eye.  What are the causes?  This condition is caused by germs (bacteria). You may get the infection if you come into close contact with:  A person who has the infection.  Items that have germs on them (are contaminated), such as face towels, contact lens solution, or eye makeup.  What increases the risk?  You are more likely to get this condition if:  You have contact with people who have the infection.  You wear contact lenses.  You have a sinus infection.  You have had a recent eye injury or surgery.  You have a weak body defense system (immune system).  You have dry eyes.  What are the signs or symptoms?    Thick, yellowish discharge from the eye.  Tearing or watery eyes.  Itchy eyes.  Burning feeling in your eyes.  Eye redness.  Swollen eyelids.  Blurred vision.  How is this treated?    Antibiotic eye drops or ointment.  Antibiotic medicine taken by mouth. This is used for infections that do not get better with drops or ointment or that last more than 10 days.  Cool, wet cloths placed on the eyes.  Artificial tears used 2-6 times a day.  Follow these instructions at home:  Medicines  Take or apply your antibiotic medicine as told by your doctor. Do not stop using it even if you start to feel better.  Take or apply over-the-counter and prescription medicines only as told by your doctor.  Do not touch your eyelid with the eye-drop bottle or the ointment tube.  Managing discomfort  Wipe any fluid from your eye with a warm, wet washcloth or a cotton ball.  Place a clean, cool, wet cloth on your eye. Do this for 10-20 minutes, 3-4 times a day.  General  instructions  Do not wear contacts until the infection is gone. Wear glasses until your doctor says it is okay to wear contacts again.  Do not wear eye makeup until the infection is gone. Throw away old eye makeup.  Change or wash your pillowcase every day.  Do not share towels or washcloths.  Wash your hands often with soap and water for at least 20 seconds and especially before touching your face or eyes. Use paper towels to dry your hands.  Do not touch or rub your eyes.  Do not drive or use heavy machinery if your vision is blurred.  Contact a doctor if:  You have a fever.  You do not get better after 10 days.  Get help right away if:  You have a fever and your symptoms get worse all of a sudden.  You have very bad pain when you move your eye.  Your face:  Hurts.  Is red.  Is swollen.  You have sudden loss of vision.  Summary  Bacterial conjunctivitis is an infection of your conjunctiva.  This infection spreads easily from person to person.  Wash your hands often with soap and water for at least 20 seconds and especially before touching your face or eyes. Use paper towels to dry your hands.  Take or apply your antibiotic medicine as told by your doctor.  Contact a doctor if you have a fever or you do not get better after 10 days.  This information is not intended to replace advice given to you by your health care provider. Make sure you discuss any questions you have with your health care provider.  Document Revised: 03/30/2022 Document Reviewed: 03/30/2022  [employer] Patient Education © 2024 [employer]

## 2024-12-03 NOTE — PROGRESS NOTES
No chief complaint on file.      Video Visit Reason:   Free Text Description: Possible. Pink eye  Subjective   Paola Sow is a 54 y.o. female.     History of Present Illness  Left eye redness, drainage and itching for the last couple of days with increased severity. No change in vision or eye pain.  Conjunctivitis   The current episode started 2 days ago. The onset was sudden. The problem has been gradually worsening. Associated symptoms include eye itching, eye discharge and eye redness. Pertinent negatives include no decreased vision, no photophobia and no eye pain. The left eye is affected. The eyelid exhibits no abnormality.       The following portions of the patient's history were reviewed and updated as appropriate: allergies, current medications, past medical history, and problem list.      Past Medical History:   Diagnosis Date    Abnormal Pap smear of cervix     Acid reflux disease     Ankle pain, right     Anxiety     Arthritis     Asthma     Callus     Depression     Diabetes mellitus     Fallen arches     Heart disease     Heart disease     Hemorrhoids     High blood pressure     Hypertension     Ingrown toenail     Plantar warts     Polycystic ovary syndrome     Rectal bleeding     Sleep apnea     Tinnitus      Social History     Socioeconomic History    Marital status:    Tobacco Use    Smoking status: Never     Passive exposure: Never    Smokeless tobacco: Never   Vaping Use    Vaping status: Never Used   Substance and Sexual Activity    Alcohol use: Yes     Alcohol/week: 1.0 standard drink of alcohol     Types: 1 Glasses of wine per week     Comment: I only drink sporadically.    Drug use: Never    Sexual activity: Yes     Partners: Male     Birth control/protection: Post-menopausal, None     medication documentation: reviewed and updated with patient and   Current Outpatient Medications:     apixaban (ELIQUIS) 5 MG tablet tablet, Take 1 tablet by mouth 2 (Two) Times a Day., Disp: , Rfl:      atorvastatin (LIPITOR) 10 MG tablet, Take 1 tablet by mouth Daily., Disp: 90 tablet, Rfl: 1    famotidine (PEPCID) 20 MG tablet, Take 1 tablet by mouth Daily. daily, Disp: , Rfl:     fluocinolone acetonide (DermOtic) 0.01 % oil otic oil, Use 5 drops bilateral ears twice daily for 3 weeks., Disp: 20 mL, Rfl: 2    Glucos-Chondroit-Hyaluron-MSM (GLUCOSAMINE CHONDROITIN JOINT PO), Glucosamine Chondroitin Joint, Disp: , Rfl:     metoprolol succinate XL (TOPROL-XL) 50 MG 24 hr tablet, Take 1 tablet by mouth Every 12 (Twelve) Hours for 30 days., Disp: 60 tablet, Rfl: 0    Mounjaro 2.5 MG/0.5ML solution pen-injector pen, ADMINISTER 2.5 MG UNDER THE SKIN 1 TIME EVERY WEEK AS DIRECTED, Disp: 2 mL, Rfl: 1    ondansetron ODT (ZOFRAN-ODT) 4 MG disintegrating tablet, Place 1 tablet on the tongue Every 8 (Eight) Hours As Needed for Nausea or Vomiting., Disp: 30 tablet, Rfl: 2    Tirzepatide (MOUNJARO) 5 MG/0.5ML solution pen-injector pen, Inject 0.5 mL under the skin into the appropriate area as directed 1 (One) Time Per Week. Continuation dose, Disp: 2 mL, Rfl: 2    traZODone (DESYREL) 50 MG tablet, Take 0.5 tablets by mouth At Night As Needed for Sleep., Disp: 90 tablet, Rfl: 1    trimethoprim-polymyxin b (Polytrim) 01883-5.1 UNIT/ML-% ophthalmic solution, Administer 1 drop into the left eye Every 4 (Four) Hours for 7 days., Disp: 10 mL, Rfl: 0    vitamin D (ERGOCALCIFEROL) 1.25 MG (20600 UT) capsule capsule, TAKE 1 CAPSULE BY MOUTH 1 TIME EVERY WEEK, Disp: 13 capsule, Rfl: 1  Review of Systems   Eyes:  Positive for discharge, redness and itching. Negative for photophobia, pain and visual disturbance.       Objective   Physical Exam  Eyes:      General: Lids are normal.      Conjunctiva/sclera:      Right eye: Right conjunctiva is not injected. No exudate.     Left eye: Left conjunctiva is injected. Exudate present.           Assessment & Plan   Diagnoses and all orders for this visit:    1. Acute conjunctivitis of left eye,  unspecified acute conjunctivitis type (Primary)  -     trimethoprim-polymyxin b (Polytrim) 74320-7.1 UNIT/ML-% ophthalmic solution; Administer 1 drop into the left eye Every 4 (Four) Hours for 7 days.  Dispense: 10 mL; Refill: 0                    Follow Up:  If your symptoms are not resolving by the completion of your treatment or are worsening, see your primary care provider for follow up. If you don't have a primary care provider, you may go to any Urgent Care for re-evaluation. If you develop any life threatening symptoms, go to the nearest Emergency Department immediately or call EMS.               The use of  Video Visit was utilized during this visit, using both [employer] and [employer]. The use of a video visit has been reviewed with the patient and verbal informed consent has been obtained. No technical difficulties occurred during the visit.    is located at [address] [place] [id number]  Provider is located at [place], [place]

## 2024-12-03 NOTE — LETTER
December 3, 2024     Patient: Paola Sow   YOB: 1970   Date of Visit: 12/3/2024       To Whom It May Concern:    It is my medical opinion that Paola Sow may return to work 12/5/2024.            Sincerely,        DEWEY Garcia    CC: No Recipients

## 2024-12-04 NOTE — PAYOR COMM NOTE
"Rosa Gibson (51 y.o. Female)             Date of Birth   1970    Social Security Number       Address   [address] [place] [address]    Home Phone   [phone]    MRN   [id number]       Zoroastrianism   Other    Marital Status                               Admission Date   22    Admission Type   Emergency    Admitting Provider   Arthur Deleon DO    Attending Provider   Tania Hicks MD    Department, Room/Bed   [facility] PROGRESSIVE CARE UNIT,        Discharge Date       Discharge Disposition       Discharge Destination                               Attending Provider: Tania Hicks MD    Allergies: No Known Allergies    Isolation: None   Infection: None   Code Status: CPR   Advance Care Planning Activity    Ht: 170.2 cm (67\")   Wt: 157 kg (345 lb 3.9 oz)    Admission Cmt: None   Principal Problem: None                Active Insurance as of 2022     Primary Coverage     Payor Plan Insurance Group Employer/Plan Group    ANTH BLUE CROSS [id number]     Payor Plan Address Payor Plan Phone Number Payor Plan Fax Number Effective Dates    [address]   2015 - None Entered    [place] [address]       Subscriber Name Subscriber Birth Date Member ID       ROSA GIBSON 1970 [id number]                 Emergency Contacts      (Rel.) Home Phone Work Phone Mobile Phone    SONALI MCCORMICK (Spouse) [phone] -- --        PENDED CASE      CONTACT   ELKIN S UTILIZATION REVIEW    [place] [address] [place] [address]  TAX ID [id number]  NPI  [id number]    PH   [phone]   [phone]       History & Physical      Lorrie White PA at 22 0042     Attestation signed by Arthur Deleon DO at 22 0221    I have reviewed this documentation and agree.    Arthur Deleon DO                   [facility]  Date: 2022   Patient Name: Rosa Gibson  : 1970  MRN: " Made another attempt to contact patient to discuss message sent through the portal. No answer to tc. Left a vmm requesting a rtc or response via the portal.      Awaiting patient response at this time.   "[id number]  Primary Care Physician:  Laurence Jacob APRN  Date of admission: 9/1/2022    Subjective   Subjective     Chief Complaint: Palpitations, shortness of breath    HPI:    Paola Sow is a 51 y.o. female who has a past medical history of heart disease, \"chronic inflammation\", hypertension, and GERD who presented to emergency department for sudden onset of shortness of breath and heart palpitations.  Denies any recent fever, chills, cough, or known sick contacts.  No similar symptoms previously. She does not take any blood thinners.  On arrival in the emergency department patient's heart rate was in the 140s.  She was found to be in A. fib with RVR.  Was placed on a diltiazem drip after having received a 10 mg diltiazem bolus without improvement in her rate.  Laboratory evaluation overall unremarkable.  Her heart rate improved to low 100s after being placed on diltiazem drip.  Because of the above, the hospitalist team was contacted to admit the patient for further management.      Personal History     Past Medical History:  Past Medical History:   Diagnosis Date   • Acid reflux disease    • Anxiety    • Arthritis    • Asthma    • Callus    • Fallen arches    • Heart disease    • Heart disease    • Hemorrhoids    • High blood pressure    • Hypertension    • Ingrown toenail    • Plantar warts    • Rectal bleeding        Past Surgical History:  Past Surgical History:   Procedure Laterality Date   • COLONOSCOPY     • DENTAL PROCEDURE     • FOOT SURGERY Bilateral        Family History:   Family History   Problem Relation Age of Onset   • Heart disease Mother    • Osteoporosis Mother    • Arthritis Mother    • Heart disease Father    • Cancer Father    • Diabetes Neg Hx    • Stroke Neg Hx        Social History:    reports that she has never smoked. She has never used smokeless tobacco. She reports current alcohol use of about 1.0 standard drink of alcohol per week. She reports that she does not use " "drugs.    Home Medications:  famotidine, fluocinolone acetonide, hydroxychloroquine, lisinopril, meloxicam, metoprolol succinate XL, and triamterene-hydrochlorothiazide    Allergies:  No Known Allergies    Review of Systems   All systems were reviewed and negative except for: Heart palpitations, shortness of breath    Objective   Objective     Vitals:   Temp:  [98.3 °F (36.8 °C)] 98.3 °F (36.8 °C)  Heart Rate:  [70-95] 92  Resp:  [20] 20  BP: (120-163)/() 120/103    Physical Exam    Constitutional: Awake, alert, no acute distress   Eyes: Pupils equal, sclerae anicteric, no conjunctival injection   HENT: NCAT, mucous membranes moist   Neck: Supple, no thyromegaly, no lymphadenopathy, trachea midline   Respiratory: Clear to auscultation bilaterally, nonlabored respirations    Cardiovascular: Irregularly irregular, no murmurs, rubs, or gallops, palpable pedal pulses bilaterally   Gastrointestinal: Positive bowel sounds, obese abdomen is soft, nontender, nondistended   Musculoskeletal: No bilateral ankle edema, no clubbing or cyanosis to extremities   Psychiatric: Appropriate affect, cooperative   Neurologic: Oriented x 3, strength symmetric in all extremities, Cranial Nerves grossly intact to confrontation, speech clear   Skin: No rashes     Imaging:   No results found.    Result Review    Result Review:  I have personally reviewed the results from the time of this admission to 9/2/2022 02:20 EDT and agree with these findings:  [x]  Laboratory  [x]  Microbiology  [x]  Radiology  [x]  EKG/Telemetry   []  Cardiology/Vascular   []  Pathology  [x]  Old records  []  Other:      Assessment & Plan   Assessment / Plan     Assessment:   New onset A. fib RVR  \"Chronic inflammation\" on Plaquenil  Essential hypertension  Hyperlipidemia  Obstructive sleep apnea on home CPAP  GERD      Plan:    Admit to hospitalist service  Labs and imaging reviewed  Will defer to daytime team to consult cardiology should they see " fit  Continue diltiazem drip for now.  Resume patient's home metoprolol and wean diltiazem drip as able  Resume patient's home Plaquenil  Add on BNP and D-dimer to rule out other causes of A. fib.  If no other source found question if patient's lupus is leading to her A. Fib.  Obtain echo in the morning    [id number] is 1.  Will consider anticoagulation once patient seen by cardiology.    Patient's clinical course will dictate further management  Discussed with ED physician, RN      DVT prophylaxis:  Mechanical DVT prophylaxis orders are present.    CODE STATUS:    Code Status (Patient has no pulse and is not breathing): CPR (Attempt to Resuscitate)  Medical Interventions (Patient has pulse or is breathing): Full Support      Admission Status:  I believe this patient meets inpatient status.    Electronically signed by CELINA Arevalo, 09/02/22, 12:42 AM EDT.               Electronically signed by Arthur Deleon DO at 09/02/22 0221          Emergency Department Notes      April Keyes, RN at 09/01/22 2237        Pt states her heart has been racing and it feels irregular for the last few hours. Pt took one baby ASA.    Electronically signed by April Keyes RN at 09/01/22 2238       Physician Progress Notes (last 24 hours)  Notes from 09/01/22 1034 through 09/02/22 1034   No notes of this type exist for this encounter.         Consult Notes (last 24 hours)  Notes from 09/01/22 1034 through 09/02/22 1034   No notes of this type exist for this encounter.       Hilda Mejia RNA    Registered Nurse      Plan of Care      Signed   Date of Service:  09/02/22 0519   Creation Time:  09/02/22 0519              Signed              Show:Clear all  []Manual[x]Template[]Copied    Added by:  [x]Hilda Mejia RNA      []Adalid for details    Goal Outcome Evaluation:  Plan of Care Reviewed With: patient  Progress: improving  Outcome Evaluation: Maintaining cardizem gtt, currently running at 7.5. Patient remains in  AFib. Patient using home cpap. No complaints at this time. DARREL,RN

## 2024-12-13 ENCOUNTER — TELEPHONE (OUTPATIENT)
Dept: FAMILY MEDICINE CLINIC | Facility: CLINIC | Age: 54
End: 2024-12-13
Payer: COMMERCIAL

## 2024-12-13 RX ORDER — TIRZEPATIDE 5 MG/.5ML
INJECTION, SOLUTION SUBCUTANEOUS
COMMUNITY
End: 2024-12-13 | Stop reason: SDUPTHER

## 2024-12-13 NOTE — TELEPHONE ENCOUNTER
Rx Refill Note  Requested Prescriptions      No prescriptions requested or ordered in this encounter      Last office visit with prescribing clinician: 7/29/2024   Last telemedicine visit with prescribing clinician: Visit date not found   Next office visit with prescribing clinician: 1/27/2025   {TIP  Encounters:23}    {TIP  Please add Last Relevant Lab Date if appropriate:23}              {TIP  Is Refill Pharmacy correct?:23}    Would you like a call back once the refill request has been completed: [] Yes [] No    If the office needs to give you a call back, can they leave a voicemail: [] Yes [] No    Maya Starkey Rep  12/13/24, 10:42 EST

## 2024-12-16 RX ORDER — TIRZEPATIDE 5 MG/.5ML
1 INJECTION, SOLUTION SUBCUTANEOUS WEEKLY
Qty: 2 ML | Refills: 2 | Status: SHIPPED | OUTPATIENT
Start: 2024-12-16

## 2025-01-27 ENCOUNTER — OFFICE VISIT (OUTPATIENT)
Dept: FAMILY MEDICINE CLINIC | Facility: CLINIC | Age: 55
End: 2025-01-27
Payer: COMMERCIAL

## 2025-01-27 VITALS
HEIGHT: 67 IN | TEMPERATURE: 97.5 F | HEART RATE: 84 BPM | WEIGHT: 280 LBS | OXYGEN SATURATION: 97 % | BODY MASS INDEX: 43.95 KG/M2 | SYSTOLIC BLOOD PRESSURE: 126 MMHG | DIASTOLIC BLOOD PRESSURE: 80 MMHG

## 2025-01-27 DIAGNOSIS — E55.9 VITAMIN D DEFICIENCY: ICD-10-CM

## 2025-01-27 DIAGNOSIS — J34.89 RHINORRHEA: ICD-10-CM

## 2025-01-27 DIAGNOSIS — E11.59 TYPE 2 DIABETES MELLITUS WITH OTHER CIRCULATORY COMPLICATION, WITHOUT LONG-TERM CURRENT USE OF INSULIN: ICD-10-CM

## 2025-01-27 DIAGNOSIS — Z00.00 ANNUAL PHYSICAL EXAM: Primary | ICD-10-CM

## 2025-01-27 DIAGNOSIS — K59.00 CONSTIPATION, UNSPECIFIED CONSTIPATION TYPE: ICD-10-CM

## 2025-01-27 DIAGNOSIS — E78.2 MIXED HYPERLIPIDEMIA: ICD-10-CM

## 2025-01-27 DIAGNOSIS — I10 PRIMARY HYPERTENSION: ICD-10-CM

## 2025-01-27 LAB
25(OH)D3 SERPL-MCNC: 32.5 NG/ML (ref 30–100)
ALBUMIN SERPL-MCNC: 4 G/DL (ref 3.5–5.2)
ALBUMIN UR-MCNC: <1.2 MG/DL
ALBUMIN/GLOB SERPL: 1.3 G/DL
ALP SERPL-CCNC: 83 U/L (ref 39–117)
ALT SERPL W P-5'-P-CCNC: 28 U/L (ref 1–33)
ANION GAP SERPL CALCULATED.3IONS-SCNC: 10.6 MMOL/L (ref 5–15)
AST SERPL-CCNC: 22 U/L (ref 1–32)
BASOPHILS # BLD AUTO: 0.05 10*3/MM3 (ref 0–0.2)
BASOPHILS NFR BLD AUTO: 0.6 % (ref 0–1.5)
BILIRUB SERPL-MCNC: 0.3 MG/DL (ref 0–1.2)
BUN SERPL-MCNC: 15 MG/DL (ref 6–20)
BUN/CREAT SERPL: 18.8 (ref 7–25)
CALCIUM SPEC-SCNC: 9.5 MG/DL (ref 8.6–10.5)
CHLORIDE SERPL-SCNC: 102 MMOL/L (ref 98–107)
CHOLEST SERPL-MCNC: 145 MG/DL (ref 0–200)
CO2 SERPL-SCNC: 28.4 MMOL/L (ref 22–29)
CREAT SERPL-MCNC: 0.8 MG/DL (ref 0.57–1)
DEPRECATED RDW RBC AUTO: 42.5 FL (ref 37–54)
EGFRCR SERPLBLD CKD-EPI 2021: 87.7 ML/MIN/1.73
EOSINOPHIL # BLD AUTO: 0.17 10*3/MM3 (ref 0–0.4)
EOSINOPHIL NFR BLD AUTO: 2 % (ref 0.3–6.2)
ERYTHROCYTE [DISTWIDTH] IN BLOOD BY AUTOMATED COUNT: 12.5 % (ref 12.3–15.4)
GLOBULIN UR ELPH-MCNC: 3.2 GM/DL
GLUCOSE SERPL-MCNC: 90 MG/DL (ref 65–99)
HBA1C MFR BLD: 5.2 % (ref 4.8–5.6)
HCT VFR BLD AUTO: 47.6 % (ref 34–46.6)
HDLC SERPL-MCNC: 51 MG/DL (ref 40–60)
HGB BLD-MCNC: 16 G/DL (ref 12–15.9)
IMM GRANULOCYTES # BLD AUTO: 0.03 10*3/MM3 (ref 0–0.05)
IMM GRANULOCYTES NFR BLD AUTO: 0.4 % (ref 0–0.5)
LDLC SERPL CALC-MCNC: 78 MG/DL (ref 0–100)
LDLC/HDLC SERPL: 1.51 {RATIO}
LYMPHOCYTES # BLD AUTO: 1.84 10*3/MM3 (ref 0.7–3.1)
LYMPHOCYTES NFR BLD AUTO: 21.5 % (ref 19.6–45.3)
MCH RBC QN AUTO: 31.4 PG (ref 26.6–33)
MCHC RBC AUTO-ENTMCNC: 33.6 G/DL (ref 31.5–35.7)
MCV RBC AUTO: 93.5 FL (ref 79–97)
MONOCYTES # BLD AUTO: 0.56 10*3/MM3 (ref 0.1–0.9)
MONOCYTES NFR BLD AUTO: 6.6 % (ref 5–12)
NEUTROPHILS NFR BLD AUTO: 5.89 10*3/MM3 (ref 1.7–7)
NEUTROPHILS NFR BLD AUTO: 68.9 % (ref 42.7–76)
NRBC BLD AUTO-RTO: 0 /100 WBC (ref 0–0.2)
PLATELET # BLD AUTO: 307 10*3/MM3 (ref 140–450)
PMV BLD AUTO: 8.9 FL (ref 6–12)
POTASSIUM SERPL-SCNC: 4.4 MMOL/L (ref 3.5–5.2)
PROT SERPL-MCNC: 7.2 G/DL (ref 6–8.5)
RBC # BLD AUTO: 5.09 10*6/MM3 (ref 3.77–5.28)
SODIUM SERPL-SCNC: 141 MMOL/L (ref 136–145)
TRIGL SERPL-MCNC: 86 MG/DL (ref 0–150)
TSH SERPL DL<=0.05 MIU/L-ACNC: 1.68 UIU/ML (ref 0.27–4.2)
VLDLC SERPL-MCNC: 16 MG/DL (ref 5–40)
WBC NRBC COR # BLD AUTO: 8.54 10*3/MM3 (ref 3.4–10.8)

## 2025-01-27 PROCEDURE — 82306 VITAMIN D 25 HYDROXY: CPT | Performed by: NURSE PRACTITIONER

## 2025-01-27 PROCEDURE — 99396 PREV VISIT EST AGE 40-64: CPT | Performed by: NURSE PRACTITIONER

## 2025-01-27 PROCEDURE — 83036 HEMOGLOBIN GLYCOSYLATED A1C: CPT | Performed by: NURSE PRACTITIONER

## 2025-01-27 PROCEDURE — 82043 UR ALBUMIN QUANTITATIVE: CPT | Performed by: NURSE PRACTITIONER

## 2025-01-27 PROCEDURE — 80061 LIPID PANEL: CPT | Performed by: NURSE PRACTITIONER

## 2025-01-27 PROCEDURE — 80050 GENERAL HEALTH PANEL: CPT | Performed by: NURSE PRACTITIONER

## 2025-01-27 PROCEDURE — 99213 OFFICE O/P EST LOW 20 MIN: CPT | Performed by: NURSE PRACTITIONER

## 2025-01-27 NOTE — PROGRESS NOTES
"  ENCOUNTER DATE:  01/27/2025    Paola Sow / 54 y.o. / female      CHIEF COMPLAINT     Annual Exam      VITALS     Visit Vitals  /80   Pulse 84   Temp 97.5 °F (36.4 °C)   Ht 170.2 cm (67\")   Wt 127 kg (280 lb)   SpO2 97%   BMI 43.85 kg/m²       BP Readings from Last 3 Encounters:   01/27/25 126/80   07/29/24 142/98   06/07/24 132/74     Wt Readings from Last 3 Encounters:   01/27/25 127 kg (280 lb)   07/29/24 132 kg (291 lb)   06/07/24 134 kg (296 lb 6.4 oz)      Body mass index is 43.85 kg/m².    MEDICATIONS     Current Outpatient Medications on File Prior to Visit   Medication Sig Dispense Refill    apixaban (ELIQUIS) 5 MG tablet tablet Take 1 tablet by mouth 2 (Two) Times a Day.      atorvastatin (LIPITOR) 10 MG tablet Take 1 tablet by mouth Daily. 90 tablet 1    famotidine (PEPCID) 20 MG tablet Take 1 tablet by mouth Daily. daily      fluocinolone acetonide (DermOtic) 0.01 % oil otic oil Use 5 drops bilateral ears twice daily for 3 weeks. 20 mL 2    metoprolol succinate XL (TOPROL-XL) 50 MG 24 hr tablet Take 1 tablet by mouth Every 12 (Twelve) Hours for 30 days. 60 tablet 0    ondansetron ODT (ZOFRAN-ODT) 4 MG disintegrating tablet Place 1 tablet on the tongue Every 8 (Eight) Hours As Needed for Nausea or Vomiting. 30 tablet 2    Tirzepatide (Mounjaro) 5 MG/0.5ML solution auto-injector Inject 1 dose under the skin into the appropriate area as directed 1 (One) Time Per Week. 2 mL 2    traZODone (DESYREL) 50 MG tablet Take 0.5 tablets by mouth At Night As Needed for Sleep. 90 tablet 1    Glucos-Chondroit-Hyaluron-MSM (GLUCOSAMINE CHONDROITIN JOINT PO) Glucosamine Chondroitin Joint (Patient not taking: Reported on 1/27/2025)      vitamin D (ERGOCALCIFEROL) 1.25 MG (59191 UT) capsule capsule TAKE 1 CAPSULE BY MOUTH 1 TIME EVERY WEEK (Patient not taking: Reported on 1/27/2025) 13 capsule 1    [DISCONTINUED] Tirzepatide (MOUNJARO) 5 MG/0.5ML solution pen-injector pen Inject 0.5 mL under the skin into the " appropriate area as directed 1 (One) Time Per Week. Continuation dose (Patient not taking: Reported on 1/27/2025) 2 mL 2     No current facility-administered medications on file prior to visit.         HISTORY OF PRESENT ILLNESS     Paola presents for annual health maintenance visit.  Lab Results   Component Value Date    HPVAPTIMA Negative 05/26/2023      Last health maintenance visit: approximately 1 year ago  General health: some medical problems  Lifestyle:  Attempting to lose weight?: No   Diet:  increase protien and lowered Carb  Exercise:  not really  Tobacco: Never used   Alcohol: occasional/infrequent  Work: Full-time  Reproductive health:  Sexually active?: No   Sexual problems?: No problems  Concern for STD?: No    Sees Gynecologist?: Yes   Aileen/Postmenopausal?: Yes   Domestic abuse concerns: No   Depression Screening:          PHQ-9 Depression Screening  Little interest or pleasure in doing things? Not at all   Feeling down, depressed, or hopeless? Not at all   PHQ-2 Total Score 0   Trouble falling or staying asleep, or sleeping too much?     Feeling tired or having little energy?     Poor appetite or overeating?     Feeling bad about yourself - or that you are a failure or have let yourself or your family down?     Trouble concentrating on things, such as reading the newspaper or watching television?     Moving or speaking so slowly that other people could have noticed? Or the opposite - being so fidgety or restless that you have been moving around a lot more than usual?     Thoughts that you would be better off dead, or of hurting yourself in some way?     PHQ-9 Total Score     If you checked off any problems, how difficult have these problems made it for you to do your work, take care of things at home, or get along with other people?         SHARMAINE-7           Patient Care Team:  Laurence Jacob APRN as PCP - General (Nurse Practitioner)  Lisseth Andrews APRN (Inactive) as Nurse Practitioner  (Otolaryngology)      ALLERGIES  No Known Allergies     PFSH:     The following portions of the patient's history were reviewed and updated as appropriate: Allergies / Current Medications / Past Medical History / Surgical History / Social History / Family History    PROBLEM LIST   Patient Active Problem List   Diagnosis    Right shoulder tendinitis    Right carpal tunnel syndrome    Impingement syndrome of right shoulder    Arthritis of carpometacarpal (CMC) joint of right thumb    Abnormal facial hair    Perimenopausal vasomotor symptoms    Atrial fibrillation with RVR       PAST MEDICAL HISTORY  Past Medical History:   Diagnosis Date    Abnormal Pap smear of cervix     Acid reflux disease     Ankle pain, right     Anxiety     Arthritis     Asthma     Callus     Depression     Diabetes mellitus     Fallen arches     Heart disease     Heart disease     Hemorrhoids     High blood pressure     Hypertension     Ingrown toenail     Plantar warts     Polycystic ovary syndrome     Rectal bleeding     Sleep apnea     Tinnitus        SURGICAL HISTORY  Past Surgical History:   Procedure Laterality Date    COLONOSCOPY      DENTAL PROCEDURE      FOOT SURGERY Bilateral        SOCIAL HISTORY  Social History     Socioeconomic History    Marital status:    Tobacco Use    Smoking status: Never     Passive exposure: Never    Smokeless tobacco: Never   Vaping Use    Vaping status: Never Used   Substance and Sexual Activity    Alcohol use: Yes     Alcohol/week: 1.0 standard drink of alcohol     Types: 1 Glasses of wine per week     Comment: I only drink sporadically.    Drug use: Never    Sexual activity: Yes     Partners: Male     Birth control/protection: Post-menopausal, None       FAMILY HISTORY  Family History   Problem Relation Age of Onset    Prostate cancer Father     Rashes / Skin problems Father     Heart disease Father     Cancer Father     Skin cancer Father     Rashes / Skin problems Mother     Rheum arthritis  Mother     Heart disease Mother     Osteoporosis Mother     Arthritis Mother     Lupus Mother     Hearing loss Mother     Hashimoto's thyroiditis Sister     Diabetes Neg Hx     Stroke Neg Hx     Breast cancer Neg Hx     Ovarian cancer Neg Hx     Uterine cancer Neg Hx     Colon cancer Neg Hx     Melanoma Neg Hx        IMMUNIZATION HISTORY  Immunization History   Administered Date(s) Administered    COVID-19 (JAMAAL) 03/11/2021    COVID-19 (MODERNA) 1st,2nd,3rd Dose Monovalent 10/23/2021    COVID-19 (MODERNA) BIVALENT 12+YRS 11/11/2022    Flu Vaccine Intradermal Quad 18-64YR 10/01/2021, 10/15/2023    Fluzone  >6mos 10/16/2024    Influenza, Unspecified 10/01/2019, 10/09/2020    Pneumococcal Conjugate 20-Valent (PCV20) 05/24/2022    Shingrix 09/14/2022, 01/22/2023         HPI  Diabetes  She presents for her initial diabetic visit. She has type 2 diabetes mellitus. Her disease course has been stable. There are no hypoglycemic associated symptoms. There are no diabetic associated symptoms. There are no hypoglycemic complications. There are no diabetic complications. Risk factors for coronary artery disease include diabetes mellitus, dyslipidemia, hypertension, obesity and post-menopausal. When asked about current treatments, none were reported. Her weight is stable. When asked about meal planning, she reported none. An ACE inhibitor/angiotensin II receptor blocker is not being taken. Patient is wanting to increase the Mounjaro to 5 mg.  She states that she would like to do every other month of 5 mg and 2.5 mg.  I will send over refill 5 mg she has an unopened box of 2.5 mg  at home.  Discussed with patient to call when she is needing a refill of the 2.5 mg.  Patient verbalized understanding.  Patient's postmenopausal family history of DEXA  Hyperlipidemia  This is a chronic problem. The current episode started more than 1 year ago. Recent lipid tests were reviewed and are variable. Exacerbating diseases include obesity.  Pertinent negatives include no shortness of breath. Current antihyperlipidemic treatment includes diet change.  Hypertension  This is a chronic problem. The current episode started more than 1 year ago. The problem is controlled. Pertinent negatives include no anxiety, headaches, peripheral edema or shortness of breath. Current antihypertension treatment includes beta blockers and diuretics. The current treatment provides significant improvement. There are no compliance problems.    History of Present Illness  The patient presents for an annual physical exam.    She has been managing her diabetes through dietary modifications, including increased protein intake and reduced carbohydrate consumption. However, she acknowledges the need for a higher intake of fruits and vegetables. She admits to a lack of regular exercise. She does not smoke but occasionally consumes alcohol. She is currently on a calcium supplement that includes vitamin D. She had a diabetic eye exam last week, during which her eyes were dilated, and she was informed about the onset of cataracts. She wears contacts with reading glasses on top of them. She had a Cologuard test done, which was fine. She had a mammogram done in September 2024 and has never had an abnormal mammogram. She is not at high risk for breast cancer. She had a pelvic exam during her last visit but not a Pap smear. She is not at high risk for cervical cancer. She had one abnormal Pap smear back in her 20s, which showed abnormal cells, but a recheck 3 months later was normal.    She reports occasional constipation, which she manages with daily Metamucil in her coffee and MiraLAX. Despite these measures, there have been instances where she has expressed concern.    She has been experiencing shoulder stiffness, which she attributes to her sleeping position, specifically sleeping on her side with her arm under her head. This has resulted in difficulty turning her head upon waking. She  has not replaced her pillow recently and is considering purchasing a new one designed for side sleepers. She also owns exercise bands for her shoulders but has not been using them regularly.    She experienced symptoms of a cold over the weekend, including rhinorrhea, sneezing, and a tickling sensation in her sinuses. These symptoms have since resolved. She also reports minor nosebleeds when blowing her nose.    SOCIAL HISTORY  She does not smoke cigarettes. She drinks alcohol occasionally. She works at [employer].    FAMILY HISTORY  Her parents both had cataracts.    MEDICATIONS  Current: MiraLAX, Metamucil         Physical Exam  Vitals reviewed.   Constitutional:       General: She is not in acute distress.     Appearance: Normal appearance. She is well-developed. She is morbidly obese. She is not ill-appearing.   HENT:      Head: Normocephalic and atraumatic.   Eyes:      Conjunctiva/sclera: Conjunctivae normal.      Pupils: Pupils are equal, round, and reactive to light.   Cardiovascular:      Rate and Rhythm: Normal rate and regular rhythm.      Heart sounds: No murmur heard.  Pulmonary:      Effort: Pulmonary effort is normal.      Breath sounds: Normal breath sounds. No wheezing.   Skin:     General: Skin is warm and dry.   Neurological:      Mental Status: She is alert and oriented to person, place, and time.   Psychiatric:         Mood and Affect: Mood and affect normal.         Behavior: Behavior normal.         Thought Content: Thought content normal.         Judgment: Judgment normal.         REVIEWED DATA      Labs:    Lab Results   Component Value Date     07/22/2024    K 4.5 07/22/2024    CALCIUM 9.8 07/22/2024    AST 19 07/22/2024    ALT 23 07/22/2024    BUN 17 07/22/2024    CREATININE 0.81 07/22/2024    CREATININE 0.76 01/22/2024    CREATININE 0.93 09/29/2023       Lab Results   Component Value Date    HGBA1C 5.60 07/22/2024    HGBA1C 6.00 (H) 01/22/2024    HGBA1C 6.40 (H) 09/29/2023     "TSH 2.070 07/22/2024    FREET4 1.07 04/28/2023       Lab Results   Component Value Date    LDL 72 07/22/2024    HDL 48 07/22/2024    TRIG 140 07/22/2024    CHOLHDLRATIO 3.2 04/30/2021       Lab Results   Component Value Date    VPZM57EJ 42.5 01/22/2024        Lab Results   Component Value Date    WBC 9.17 07/22/2024    HGB 15.6 07/22/2024    MCV 93.4 07/22/2024     07/22/2024       No results found for: \"PROTEIN\", \"GLUCOSEU\", \"BLOODU\", \"NITRITEU\", \"LEUKOCYTESUR\"       Lab Results   Component Value Date    HEPCVIRUSABY Non-Reactive 08/05/2022           ASSESSMENT & PLAN     Diagnoses and all orders for this visit:    1. Annual physical exam (Primary)    2. Type 2 diabetes mellitus with other circulatory complication, without long-term current use of insulin  -     CBC Auto Differential  -     Comprehensive Metabolic Panel  -     Hemoglobin A1c  -     Lipid Panel  -     MicroAlbumin, Urine, Random - Urine, Clean Catch  -     TSH    3. Mixed hyperlipidemia  -     CBC Auto Differential  -     Comprehensive Metabolic Panel  -     Lipid Panel    4. Primary hypertension  -     CBC Auto Differential  -     Comprehensive Metabolic Panel    5. Vitamin D deficiency  -     Vitamin D,25-Hydroxy    6. Constipation, unspecified constipation type    7. Rhinorrhea        Assessment & Plan  1. Annual physical examination.  Her blood pressure readings are within the normal range, and all other vital signs are stable. She is up to date with her Cologuard test and had a mammogram in September 2024. She is not at high risk for breast cancer and has never had an abnormal mammogram. She is due for her next Pap smear in 2026. Laboratory tests will be conducted today to assess her overall health status. Her vitamin D levels will also be checked. If the results indicate a deficiency, an additional supplement will be prescribed. If the levels are within the normal range, she will continue with her current calcium and vitamin D3 " regimen.    2. Constipation.  She has been advised to incorporate live probiotics into her diet to alleviate constipation.    3. Shoulder stiffness.  She has been advised to replace her pillow every 6 months to a year to prevent shoulder stiffness. She has also been encouraged to use exercise bands for shoulder exercises.    4. Cold symptoms.  She has been advised to use nasal saline and a humidifier in her bedroom to alleviate cold symptoms.    Follow-up  The patient will follow up in 6 months, or earlier if necessary.         HEALTHCARE MAINTENANCE ISSUES     Cancer Screening:  Colon: Initial/Next screening at age: CURRENT  Repeat colon cancer screening: every 3 years  Breast: Recommended monthly self exams; annual professional exam  Mammogram: every 1 year  Cervical: 3 years  Skin: Monthly self skin examination, annual exam by health professional      Lifestyle Counseling:  Lifestyle Modifications: Continue good lifestyle choices/modifications  Safety Issues: Always wear seatbelt, Avoid texting while driving   Use sunscreen, regular skin examination  Recommended annual dental/vision examination.  Emotional/Stress/Sleep: Reviewed and  given when appropriate    Part of this note may be electronic transcription/translation of spoken language to printed text using the Dragon dictation system      Patient or patient representative verbalized consent for the use of Ambient Listening during the visit with  DEWEY Stuart for chart documentation. 1/27/2025  12:52 EST

## 2025-03-10 RX ORDER — TIRZEPATIDE 5 MG/.5ML
INJECTION, SOLUTION SUBCUTANEOUS
Qty: 2 ML | Refills: 2 | Status: SHIPPED | OUTPATIENT
Start: 2025-03-10

## 2025-05-05 RX ORDER — ATORVASTATIN CALCIUM 10 MG/1
10 TABLET, FILM COATED ORAL DAILY
Qty: 90 TABLET | Refills: 1 | Status: SHIPPED | OUTPATIENT
Start: 2025-05-05

## 2025-05-21 ENCOUNTER — PRIOR AUTHORIZATION (OUTPATIENT)
Dept: FAMILY MEDICINE CLINIC | Facility: CLINIC | Age: 55
End: 2025-05-21
Payer: COMMERCIAL

## 2025-05-21 NOTE — TELEPHONE ENCOUNTER
Prior Authorization for Mounjaro PA APPROVED     This is to inform you that your Prior Authorization request for the above member’s Mounjaro  5MG/0.5ML SC SOAJ has been approved. If you are changing the member's therapy the previously  approved therapy will be canceled and replaced.  The authorization is valid from 04/06/2025 through 05/06/2026. A letter of explanation will also be  mailed to the patient.

## 2025-06-06 ENCOUNTER — OFFICE VISIT (OUTPATIENT)
Dept: OBSTETRICS AND GYNECOLOGY | Age: 55
End: 2025-06-06
Payer: COMMERCIAL

## 2025-06-06 ENCOUNTER — OFFICE VISIT (OUTPATIENT)
Dept: PULMONOLOGY | Facility: CLINIC | Age: 55
End: 2025-06-06
Payer: COMMERCIAL

## 2025-06-06 VITALS
TEMPERATURE: 97.6 F | RESPIRATION RATE: 16 BRPM | WEIGHT: 293 LBS | OXYGEN SATURATION: 96 % | BODY MASS INDEX: 45.99 KG/M2 | DIASTOLIC BLOOD PRESSURE: 79 MMHG | SYSTOLIC BLOOD PRESSURE: 133 MMHG | HEIGHT: 67 IN | HEART RATE: 77 BPM

## 2025-06-06 VITALS
DIASTOLIC BLOOD PRESSURE: 90 MMHG | HEART RATE: 64 BPM | SYSTOLIC BLOOD PRESSURE: 112 MMHG | WEIGHT: 293 LBS | BODY MASS INDEX: 46.05 KG/M2

## 2025-06-06 DIAGNOSIS — N95.1 VAGINAL DRYNESS, MENOPAUSAL: ICD-10-CM

## 2025-06-06 DIAGNOSIS — G47.33 OSA ON CPAP: Primary | Chronic | ICD-10-CM

## 2025-06-06 DIAGNOSIS — Z01.411 ENCOUNTER FOR GYNECOLOGICAL EXAMINATION WITH ABNORMAL FINDING: Primary | ICD-10-CM

## 2025-06-06 DIAGNOSIS — N95.1 PERIMENOPAUSAL VASOMOTOR SYMPTOMS: ICD-10-CM

## 2025-06-06 PROCEDURE — 87624 HPV HI-RISK TYP POOLED RSLT: CPT | Performed by: NURSE PRACTITIONER

## 2025-06-06 PROCEDURE — 99213 OFFICE O/P EST LOW 20 MIN: CPT | Performed by: NURSE PRACTITIONER

## 2025-06-06 PROCEDURE — G0123 SCREEN CERV/VAG THIN LAYER: HCPCS | Performed by: NURSE PRACTITIONER

## 2025-06-06 RX ORDER — ESTRADIOL 0.1 MG/G
CREAM VAGINAL
Qty: 42 G | Refills: 3 | Status: SHIPPED | OUTPATIENT
Start: 2025-06-06

## 2025-06-06 NOTE — PROGRESS NOTES
Primary Care Provider  Laurence Jacob APRN     Referring Provider  No ref. provider found       Patient or patient representative verbalized consent for the use of Ambient Listening during the visit with  DEWEY Dale for chart documentation. 6/6/2025  14:42 EDT    Chief Complaint  Sleep Apnea and Follow-up (1 year f/up )    Subjective          Paola Sow presents to [facility]  History of Present Illness  Paola Sow is a 54 y.o. female patient here for management of sleep apnea.      Patient continues to use and benefit from her CPAP.  Patient's most recent compliance report is from 5/7/2025 until 6/5/2025.  Patient's usage is 30/30 days with an average usage of 6 hours 43 minutes.  She is on an auto CPAP 12 to 16 cm water.  Patient's AHI is 0.4.  Average CPAP pressure is 12.3 cm water.    History of Present Illness  The patient is a 54-year-old female who presents for evaluation of sleep apnea.    She reports satisfactory respiratory function and has been consistently using her CPAP machine, with the exception of 2 days within the past 90-day period. She has experienced a weight loss of approximately 50 pounds since 2023, which she believes has positively impacted her condition. She has not encountered any difficulties in obtaining her CPAP supplies.    Supplemental Information  She had a discussion with her OB/GYN this morning about hormone replacement therapy and is wondering if it would affect her pulmonary health. She is going to talk to her cardiologist as well.    MEDICATIONS  Eliquis       Her history of smoking is   Tobacco Use: Low Risk  (6/6/2025)    Patient History     Smoking Tobacco Use: Never     Smokeless Tobacco Use: Never     Passive Exposure: Never   .    Review of Systems   Constitutional:  Negative for chills, fatigue, fever, unexpected weight gain and unexpected weight loss.   HENT:  Congestion: Nasal.    Respiratory:   Negative for apnea, cough, shortness of breath and wheezing.         Negative for Hemoptysis     Cardiovascular:  Negative for chest pain, palpitations and leg swelling.   Skin:         Negative for cyanosis      Sleep: Negative for Excessive daytime sleepiness  Negative for morning headaches  Negative for Snoring    Family History   Problem Relation Age of Onset    Prostate cancer Father     Rashes / Skin problems Father     Heart disease Father     Cancer Father     Skin cancer Father     Hypertension Father     Rashes / Skin problems Mother     Rheum arthritis Mother     Heart disease Mother     Osteoporosis Mother     Arthritis Mother     Lupus Mother     Hearing loss Mother     Hashimoto's thyroiditis Sister     Thyroid disease Sister     Diabetes Neg Hx     Stroke Neg Hx     Breast cancer Neg Hx     Ovarian cancer Neg Hx     Uterine cancer Neg Hx     Colon cancer Neg Hx     Melanoma Neg Hx         Social History     Socioeconomic History    Marital status:    Tobacco Use    Smoking status: Never     Passive exposure: Never    Smokeless tobacco: Never   Vaping Use    Vaping status: Never Used   Substance and Sexual Activity    Alcohol use: Yes     Alcohol/week: 1.0 standard drink of alcohol     Types: 1 Glasses of wine per week     Comment: I only drink sporadically.    Drug use: Never    Sexual activity: Yes     Partners: Male     Birth control/protection: Post-menopausal        Past Medical History:   Diagnosis Date    Abnormal Pap smear of cervix     Acid reflux disease     Ankle pain, right     Anxiety 2009    Situational anxiety due to divorce    Arthritis 2011    Inflammation diagnosed through blood work    Asthma 2000    Just a guess on date.  Asthma like symptoms diagnosed during a cold/virus.  Guven albuterol inhaler    Callus     Cataract 01-    Coronary artery disease 2023    I have A-Fib.  Guessing on date.  Either 2022 or 2023    Depression     Diabetes mellitus     Fallen  arches     Female infertility     Heart disease     Heart disease     Hemorrhoids     High blood pressure     Hyperlipidemia     Hypertension 2000    Guessing on date.  I think my blood pressure has always run high.    Ingrown toenail     Plantar warts     Polycystic ovary syndrome     Rectal bleeding     Sleep apnea 10 years    I have a cpap    Sleep apnea, obstructive 2013    Tinnitus January 2022    It is constant now    Varicella 1975    Had in 1st grade        Immunization History   Administered Date(s) Administered    COVID-19 (JAMAAL) 03/11/2021    COVID-19 (MODERNA) 1st,2nd,3rd Dose Monovalent 10/23/2021    COVID-19 (MODERNA) BIVALENT 12+YRS 11/11/2022    Flu Vaccine Intradermal Quad 18-64YR 10/01/2021, 10/15/2023    Fluzone  >6mos 10/16/2024    Influenza, Unspecified 10/01/2019, 10/09/2020    Pneumococcal Conjugate 20-Valent (PCV20) 05/24/2022    Shingrix 09/14/2022, 01/22/2023         No Known Allergies       Current Outpatient Medications:     apixaban (ELIQUIS) 5 MG tablet tablet, Take 1 tablet by mouth 2 (Two) Times a Day., Disp: , Rfl:     atorvastatin (LIPITOR) 10 MG tablet, TAKE 1 TABLET BY MOUTH DAILY, Disp: 90 tablet, Rfl: 1    estradiol (ESTRACE VAGINAL) 0.1 MG/GM vaginal cream, Insert 0.5 gm intravaginally and apply 0.5 g to vulva 2 times each week, Disp: 42 g, Rfl: 3    famotidine (PEPCID) 20 MG tablet, Take 1 tablet by mouth Daily. daily, Disp: , Rfl:     fluocinolone acetonide (DermOtic) 0.01 % oil otic oil, Use 5 drops bilateral ears twice daily for 3 weeks., Disp: 20 mL, Rfl: 2    Mounjaro 5 MG/0.5ML solution auto-injector, INJECT 5MG UNDER THE SKIN ONE TIME PER WEEK, Disp: 2 mL, Rfl: 2    ondansetron ODT (ZOFRAN-ODT) 4 MG disintegrating tablet, Place 1 tablet on the tongue Every 8 (Eight) Hours As Needed for Nausea or Vomiting., Disp: 30 tablet, Rfl: 2    traZODone (DESYREL) 50 MG tablet, Take 0.5 tablets by mouth At Night As Needed for Sleep., Disp: 90 tablet, Rfl: 1    metoprolol  "succinate XL (TOPROL-XL) 50 MG 24 hr tablet, Take 1 tablet by mouth Every 12 (Twelve) Hours for 30 days., Disp: 60 tablet, Rfl: 0     Objective   Physical Exam  Constitutional:       General: She is not in acute distress.     Appearance: Normal appearance. She is overweight.   HENT:      Right Ear: Hearing normal.      Left Ear: Hearing normal.      Nose: No nasal tenderness or congestion.      Mouth/Throat:      Mouth: Mucous membranes are moist. No oral lesions.   Eyes:      Extraocular Movements: Extraocular movements intact.      Pupils: Pupils are equal, round, and reactive to light.   Cardiovascular:      Rate and Rhythm: Normal rate and regular rhythm.      Pulses: Normal pulses.      Heart sounds: Normal heart sounds. No murmur heard.  Pulmonary:      Effort: Pulmonary effort is normal.      Breath sounds: Normal breath sounds. No wheezing, rhonchi or rales.   Musculoskeletal:      Right lower leg: No edema.      Left lower leg: No edema.   Skin:     General: Skin is warm and dry.      Findings: No lesion or rash.   Neurological:      General: No focal deficit present.      Mental Status: She is alert and oriented to person, place, and time.   Psychiatric:         Mood and Affect: Affect normal. Mood is not anxious or depressed.         Vital Signs:   /79 (BP Location: Right arm, Patient Position: Sitting, Cuff Size: Large Adult)   Pulse 77   Temp 97.6 °F (36.4 °C) (Oral)   Resp 16   Ht 170.2 cm (67\")   Wt 136 kg (299 lb 12.8 oz)   SpO2 96% Comment: RA  BMI 46.96 kg/m²        Result Review :   The following data was reviewed by: DEWEY Dale on 06/06/2025:  CMP          7/22/2024    15:16 1/27/2025    13:23   CMP   Glucose 70  90    BUN 17  15    Creatinine 0.81  0.80    EGFR 86.9  87.7    Sodium 139  141    Potassium 4.5  4.4    Chloride 104  102    Calcium 9.8  9.5    Total Protein 7.1  7.2    Albumin 4.2  4.0    Globulin 2.9  3.2    Total Bilirubin 0.3  0.3    Alkaline Phosphatase 88  " 83    AST (SGOT) 19  22    ALT (SGPT) 23  28    Albumin/Globulin Ratio 1.4  1.3    BUN/Creatinine Ratio 21.0  18.8    Anion Gap 8.0  10.6      CBC w/diff          7/22/2024    15:16 1/27/2025    13:23   CBC w/Diff   WBC 9.17  8.54    RBC 5.12  5.09    Hemoglobin 15.6  16.0    Hematocrit 47.8  47.6    MCV 93.4  93.5    MCH 30.5  31.4    MCHC 32.6  33.6    RDW 12.8  12.5    Platelets 295  307    Neutrophil Rel % 64.1  68.9    Immature Granulocyte Rel % 0.2  0.4    Lymphocyte Rel % 24.5  21.5    Monocyte Rel % 8.1  6.6    Eosinophil Rel % 2.3  2.0    Basophil Rel % 0.8  0.6      Data reviewed : Radiologic studies chest xray 4/28/2023 and my last office note   Procedures        Assessment and Plan    Diagnoses and all orders for this visit:    1. MARGOT on CPAP (Primary)  Comments:  continue CPAP.  Patient using and benefiting  Orders:  -     PAP Therapy        Assessment & Plan  1. Sleep Apnea.  Her CPAP usage is commendable, with an average of 7 hours per night over the past month. The current pressure setting of 12-16 cm H2O appears to be effective, maintaining an average pressure of 12.3 cm H2O. She reports no issues in obtaining her supplies. A prescription for CPAP supplies has been issued to [facility]. She is advised to continue her current treatment regimen.    Follow-up  The patient will follow up in 1 year or sooner if needed.          Follow Up   Return in about 1 year (around 6/6/2026) for Recheck.  Patient was given instructions and counseling regarding her condition or for health maintenance advice. Please see specific information pulled into the AVS if appropriate.

## 2025-06-06 NOTE — ASSESSMENT & PLAN NOTE
Discussed vaginal estrogen, R/B, patient desires.  Also discussed vaginal moisturizers as an option, will try estrogen first.

## 2025-06-06 NOTE — PROGRESS NOTES
Well Woman Visit    CC: Scheduled annual well gyn visit  Chief Complaint   Patient presents with    Gynecologic Exam     wwe       Myriad intake in the past?: yes  Changes in Family Hx since? NO    HPI:   54 y.o.   Social History     Substance and Sexual Activity   Sexual Activity Yes    Partners: Male    Birth control/protection: Post-menopausal       Menses:   denies any vaginal bleeding    PCP: does manage PMHx and preventative labs  History: PMHx, Meds, Allergies, PSHx, Social Hx, and POBHx all reviewed and updated.    Having issues with vaginal dryness.  Also considering HRT again.     PHYSICAL EXAM:  /90   Pulse 64   Wt 133 kg (294 lb)   BMI 46.05 kg/m²  Not found.     Exam conducted with a chaperone present  General- NAD, alert and oriented, appropriate  Psych- Normal mood, good memory  Neck- No masses, no thyroid enlargement  CV- Regular rhythm, no murnurs  Resp- CTA to bases, no wheezes  Abdomen- Soft, non distended, non tender, no masses    Breast left-  Bilaterally symmetrical, no masses, non tender, no nipple discharge  Breast right- Bilaterally symmetrical, no masses, non tender, no nipple discharge    External genitalia- Normal female, no lesions  Urethra/meatus- Normal, no masses, non tender  Bladder- Normal, no masses, non tender  Vagina- Normal, no atrophy, no lesions, no discharge.  Prolapse : none noted   Cvx- Normal, no lesions, no discharge, No cervical motion tenderness  Uterus- Normal size, shape & consistency.  Non tender, mobile.  Adnexa- No mass, non tender  Anus/Rectum/Perineum- Not performed    Lymphatic- No palpable neck, axillary, or groin nodes  Ext- No edema, no cyanosis    Skin- No lesions, no rashes, no acanthosis nigricans      ASSESSMENT and PLAN:    Diagnoses and all orders for this visit:    1. Encounter for gynecological examination with abnormal finding (Primary)  -     IgP, Aptima HPV  -     Mammo Screening Digital Tomosynthesis Bilateral With CAD;  Future    2. Vaginal dryness, menopausal  Assessment & Plan:  Discussed vaginal estrogen, R/B, patient desires.  Also discussed vaginal moisturizers as an option, will try estrogen first.     Orders:  -     estradiol (ESTRACE VAGINAL) 0.1 MG/GM vaginal cream; Insert 0.5 gm intravaginally and apply 0.5 g to vulva 2 times each week  Dispense: 42 g; Refill: 3    3. Perimenopausal vasomotor symptoms  Overview:  Discussed risks and benefits of HRT to include increased risk of cardiac event especially with patient's history of HTN.  Discussed use of effexor or paxil for hot flashes.  Patient prefers to not start any medication at this time.     Assessment & Plan:  Discussed R/B of HRT, patient is considering again.  Will discuss with her cardiologist and let us know if she would like to try.           Preventative:  BREAST HEALTH- Monthly self breast exam importance and how to reviewed. MMG and/or MRI (prn) reviewed per society guidelines and her individual history. Screen: Updated today  CERVICAL CANCER Screening- Reviewed current ASCCP guidelines for screening w and wo cotest HPV, age specific.  Screen: Updated today  COLON CANCER Screening- Reviewed current medical society guidelines and options.  Screen:  Already up to date  SEXUAL HEALTH: Declines STD screening  VACCINATIONS Recommended: Covid vaccine, Flu vaccine annually.  Importance discussed, risk being unvaccinated reviewed.  Questions answered  Smoking status- NON SMOKER/VAPER    HRT/ERT- newer (as of 2024) evidence, along with an in-depth review of WHI results, supports the use of iso-molecular (identical to human) estradiol for treatment of vasomotor symptoms and osteoporosis prevention without increasing risk of cardiovascular disease.  Starting within 10years of menopause and before 61yo is ideal. For patients who have not had a hysterectomy, the addition of iso-molecular micronized progesterone further enhances bone mineral density without an increase in  breast cancer incidence or mortality.  Atherosclerotic cardiovascular disease (ASCVD) risk calculated today 2%.  ASCVD risk 10-19 is moderate risk. Typically if ASCVD risk is 19% or more, HRT is not recommended.    The 10-year ASCVD risk score (Taj BANERJEE, et al., 2019) is: 2%    Values used to calculate the score:      Age: 54 years      Sex: Female      Is Non- : No      Diabetic: Yes      Tobacco smoker: No      Systolic Blood Pressure: 112 mmHg      Is BP treated: No      HDL Cholesterol: 51 mg/dL      Total Cholesterol: 145 mg/dL    She understands the importance of having any ordered tests to be performed in a timely fashion.  The risks of not performing them include, but are not limited to, advanced cancer stages, bone loss from osteoporosis and/or subsequent increase in morbidity and/or mortality.  She is encouraged to review her results online and/or contact or office if she has questions.     Follow Up:  Return in about 1 year (around 6/6/2026) for Annual physical.        DEWEY Coronado  06/06/2025    [facility] [address]  [facility]  [address] [place] [address]  Dept: [phone]  Dept Fax: [phone]  Loc: [phone]

## 2025-06-06 NOTE — ASSESSMENT & PLAN NOTE
Discussed R/B of HRT, patient is considering again.  Will discuss with her cardiologist and let us know if she would like to try.

## 2025-06-09 RX ORDER — TIRZEPATIDE 5 MG/.5ML
INJECTION, SOLUTION SUBCUTANEOUS
Qty: 2 ML | Refills: 2 | Status: SHIPPED | OUTPATIENT
Start: 2025-06-09

## 2025-06-09 RX ORDER — TIRZEPATIDE 5 MG/.5ML
5 INJECTION, SOLUTION SUBCUTANEOUS WEEKLY
Qty: 2 ML | Refills: 2 | Status: SHIPPED | OUTPATIENT
Start: 2025-06-09 | End: 2025-06-09

## 2025-06-10 LAB
CYTOLOGIST CVX/VAG CYTO: NORMAL
CYTOLOGY CVX/VAG DOC CYTO: NORMAL
CYTOLOGY CVX/VAG DOC THIN PREP: NORMAL
DX ICD CODE: NORMAL
HPV I/H RISK 4 DNA CVX QL PROBE+SIG AMP: NEGATIVE
OTHER STN SPEC: NORMAL
SERVICE CMNT-IMP: NORMAL
STAT OF ADQ CVX/VAG CYTO-IMP: NORMAL

## 2025-07-21 ENCOUNTER — TRANSCRIBE ORDERS (OUTPATIENT)
Dept: ADMINISTRATIVE | Facility: HOSPITAL | Age: 55
End: 2025-07-21
Payer: COMMERCIAL

## 2025-07-21 DIAGNOSIS — R55 SYNCOPE, NEAR: Primary | ICD-10-CM

## 2025-08-04 DIAGNOSIS — G47.00 INSOMNIA, UNSPECIFIED TYPE: ICD-10-CM

## 2025-08-04 RX ORDER — TRAZODONE HYDROCHLORIDE 50 MG/1
25 TABLET ORAL NIGHTLY PRN
Qty: 90 TABLET | Refills: 1 | Status: SHIPPED | OUTPATIENT
Start: 2025-08-04

## 2025-08-15 ENCOUNTER — OFFICE VISIT (OUTPATIENT)
Dept: FAMILY MEDICINE CLINIC | Facility: CLINIC | Age: 55
End: 2025-08-15
Payer: COMMERCIAL

## 2025-08-15 VITALS
WEIGHT: 293 LBS | TEMPERATURE: 97.8 F | BODY MASS INDEX: 45.99 KG/M2 | HEART RATE: 75 BPM | SYSTOLIC BLOOD PRESSURE: 136 MMHG | DIASTOLIC BLOOD PRESSURE: 88 MMHG | HEIGHT: 67 IN | OXYGEN SATURATION: 96 %

## 2025-08-15 DIAGNOSIS — K59.00 CONSTIPATION, UNSPECIFIED CONSTIPATION TYPE: ICD-10-CM

## 2025-08-15 DIAGNOSIS — I10 PRIMARY HYPERTENSION: ICD-10-CM

## 2025-08-15 DIAGNOSIS — Z09 FOLLOW-UP EXAM, 3-6 MONTHS SINCE PREVIOUS EXAM: Primary | ICD-10-CM

## 2025-08-15 DIAGNOSIS — E78.2 MIXED HYPERLIPIDEMIA: ICD-10-CM

## 2025-08-15 DIAGNOSIS — E55.9 VITAMIN D DEFICIENCY: ICD-10-CM

## 2025-08-15 DIAGNOSIS — E11.9 TYPE 2 DIABETES MELLITUS WITHOUT COMPLICATION, WITHOUT LONG-TERM CURRENT USE OF INSULIN: ICD-10-CM

## 2025-08-15 LAB
25(OH)D3 SERPL-MCNC: 26.7 NG/ML (ref 30–100)
ALBUMIN SERPL-MCNC: 3.7 G/DL (ref 3.5–5.2)
ALBUMIN UR-MCNC: <1.2 MG/DL
ALBUMIN/GLOB SERPL: 1.2 G/DL
ALP SERPL-CCNC: 80 U/L (ref 39–117)
ALT SERPL W P-5'-P-CCNC: 27 U/L (ref 1–33)
ANION GAP SERPL CALCULATED.3IONS-SCNC: 10.1 MMOL/L (ref 5–15)
AST SERPL-CCNC: 26 U/L (ref 1–32)
BASOPHILS # BLD AUTO: 0.07 10*3/MM3 (ref 0–0.2)
BASOPHILS NFR BLD AUTO: 0.8 % (ref 0–1.5)
BILIRUB SERPL-MCNC: 0.2 MG/DL (ref 0–1.2)
BUN SERPL-MCNC: 14 MG/DL (ref 6–20)
BUN/CREAT SERPL: 18.7 (ref 7–25)
CALCIUM SPEC-SCNC: 9.3 MG/DL (ref 8.6–10.5)
CHLORIDE SERPL-SCNC: 105 MMOL/L (ref 98–107)
CHOLEST SERPL-MCNC: 143 MG/DL (ref 0–200)
CO2 SERPL-SCNC: 26.9 MMOL/L (ref 22–29)
CREAT SERPL-MCNC: 0.75 MG/DL (ref 0.57–1)
CREAT UR-MCNC: 87.2 MG/DL
DEPRECATED RDW RBC AUTO: 45 FL (ref 37–54)
EGFRCR SERPLBLD CKD-EPI 2021: 94.7 ML/MIN/1.73
EOSINOPHIL # BLD AUTO: 0.38 10*3/MM3 (ref 0–0.4)
EOSINOPHIL NFR BLD AUTO: 4.5 % (ref 0.3–6.2)
ERYTHROCYTE [DISTWIDTH] IN BLOOD BY AUTOMATED COUNT: 13 % (ref 12.3–15.4)
GLOBULIN UR ELPH-MCNC: 3 GM/DL
GLUCOSE SERPL-MCNC: 91 MG/DL (ref 65–99)
HBA1C MFR BLD: 5.4 % (ref 4.8–5.6)
HCT VFR BLD AUTO: 45.1 % (ref 34–46.6)
HDLC SERPL-MCNC: 48 MG/DL (ref 40–60)
HGB BLD-MCNC: 14.9 G/DL (ref 12–15.9)
IMM GRANULOCYTES # BLD AUTO: 0.03 10*3/MM3 (ref 0–0.05)
IMM GRANULOCYTES NFR BLD AUTO: 0.4 % (ref 0–0.5)
LDLC SERPL CALC-MCNC: 74 MG/DL (ref 0–100)
LDLC/HDLC SERPL: 1.5 {RATIO}
LYMPHOCYTES # BLD AUTO: 2.29 10*3/MM3 (ref 0.7–3.1)
LYMPHOCYTES NFR BLD AUTO: 27.1 % (ref 19.6–45.3)
MCH RBC QN AUTO: 31.6 PG (ref 26.6–33)
MCHC RBC AUTO-ENTMCNC: 33 G/DL (ref 31.5–35.7)
MCV RBC AUTO: 95.6 FL (ref 79–97)
MICROALBUMIN/CREAT UR: NORMAL MG/G{CREAT}
MONOCYTES # BLD AUTO: 0.83 10*3/MM3 (ref 0.1–0.9)
MONOCYTES NFR BLD AUTO: 9.8 % (ref 5–12)
NEUTROPHILS NFR BLD AUTO: 4.86 10*3/MM3 (ref 1.7–7)
NEUTROPHILS NFR BLD AUTO: 57.4 % (ref 42.7–76)
NRBC BLD AUTO-RTO: 0 /100 WBC (ref 0–0.2)
PLATELET # BLD AUTO: 267 10*3/MM3 (ref 140–450)
PMV BLD AUTO: 8.9 FL (ref 6–12)
POTASSIUM SERPL-SCNC: 4.3 MMOL/L (ref 3.5–5.2)
PROT SERPL-MCNC: 6.7 G/DL (ref 6–8.5)
RBC # BLD AUTO: 4.72 10*6/MM3 (ref 3.77–5.28)
SODIUM SERPL-SCNC: 142 MMOL/L (ref 136–145)
TRIGL SERPL-MCNC: 115 MG/DL (ref 0–150)
TSH SERPL DL<=0.05 MIU/L-ACNC: 2.34 UIU/ML (ref 0.27–4.2)
VLDLC SERPL-MCNC: 21 MG/DL (ref 5–40)
WBC NRBC COR # BLD AUTO: 8.46 10*3/MM3 (ref 3.4–10.8)

## 2025-08-15 PROCEDURE — 82306 VITAMIN D 25 HYDROXY: CPT | Performed by: NURSE PRACTITIONER

## 2025-08-15 PROCEDURE — 82043 UR ALBUMIN QUANTITATIVE: CPT | Performed by: NURSE PRACTITIONER

## 2025-08-15 PROCEDURE — 82570 ASSAY OF URINE CREATININE: CPT | Performed by: NURSE PRACTITIONER

## 2025-08-15 PROCEDURE — 80050 GENERAL HEALTH PANEL: CPT | Performed by: NURSE PRACTITIONER

## 2025-08-15 PROCEDURE — 83036 HEMOGLOBIN GLYCOSYLATED A1C: CPT | Performed by: NURSE PRACTITIONER

## 2025-08-15 PROCEDURE — 80061 LIPID PANEL: CPT | Performed by: NURSE PRACTITIONER

## 2025-08-15 RX ORDER — FLUOCINOLONE ACETONIDE 0.11 MG/ML
OIL AURICULAR (OTIC)
Qty: 20 ML | Refills: 2 | Status: SHIPPED | OUTPATIENT
Start: 2025-08-15